# Patient Record
Sex: FEMALE | Race: WHITE | NOT HISPANIC OR LATINO | Employment: FULL TIME | URBAN - METROPOLITAN AREA
[De-identification: names, ages, dates, MRNs, and addresses within clinical notes are randomized per-mention and may not be internally consistent; named-entity substitution may affect disease eponyms.]

---

## 2023-05-11 DIAGNOSIS — E87.6 HYPOKALEMIA: ICD-10-CM

## 2023-05-11 DIAGNOSIS — K21.9 GASTROESOPHAGEAL REFLUX DISEASE, UNSPECIFIED WHETHER ESOPHAGITIS PRESENT: Primary | ICD-10-CM

## 2023-05-12 RX ORDER — POTASSIUM CHLORIDE 1500 MG/1
20 TABLET, EXTENDED RELEASE ORAL DAILY
Qty: 90 TABLET | Refills: 2 | Status: SHIPPED | OUTPATIENT
Start: 2023-05-12 | End: 2023-05-17

## 2023-05-12 RX ORDER — OMEPRAZOLE 40 MG/1
40 CAPSULE, DELAYED RELEASE ORAL DAILY
Qty: 90 CAPSULE | Refills: 2 | Status: SHIPPED | OUTPATIENT
Start: 2023-05-12 | End: 2023-05-17

## 2023-05-17 DIAGNOSIS — K21.9 GASTROESOPHAGEAL REFLUX DISEASE, UNSPECIFIED WHETHER ESOPHAGITIS PRESENT: ICD-10-CM

## 2023-05-17 DIAGNOSIS — E87.6 HYPOKALEMIA: ICD-10-CM

## 2023-05-17 RX ORDER — OMEPRAZOLE 40 MG/1
CAPSULE, DELAYED RELEASE ORAL
Qty: 30 CAPSULE | Refills: 0 | Status: SHIPPED | OUTPATIENT
Start: 2023-05-17 | End: 2023-06-13

## 2023-05-17 RX ORDER — POTASSIUM CHLORIDE 1500 MG/1
TABLET, EXTENDED RELEASE ORAL
Qty: 30 TABLET | Refills: 0 | Status: SHIPPED | OUTPATIENT
Start: 2023-05-17 | End: 2023-05-30

## 2023-05-19 LAB
CHOLESTEROL (MG/DL) IN SER/PLAS: 138 MG/DL (ref 0–199)
CHOLESTEROL IN HDL (MG/DL) IN SER/PLAS: 37.7 MG/DL
CHOLESTEROL/HDL RATIO: 3.7
LDL: ABNORMAL MG/DL (ref 0–99)
NON HDL CHOLESTEROL: 100 MG/DL
TRIGLYCERIDE (MG/DL) IN SER/PLAS: 440 MG/DL (ref 0–149)
VLDL: ABNORMAL MG/DL (ref 0–40)

## 2023-05-29 DIAGNOSIS — Z79.4 TYPE 2 DIABETES MELLITUS WITHOUT COMPLICATION, WITH LONG-TERM CURRENT USE OF INSULIN (MULTI): Primary | ICD-10-CM

## 2023-05-29 DIAGNOSIS — E87.6 HYPOKALEMIA: ICD-10-CM

## 2023-05-29 DIAGNOSIS — E11.9 TYPE 2 DIABETES MELLITUS WITHOUT COMPLICATION, WITH LONG-TERM CURRENT USE OF INSULIN (MULTI): Primary | ICD-10-CM

## 2023-05-30 RX ORDER — POTASSIUM CHLORIDE 1500 MG/1
TABLET, EXTENDED RELEASE ORAL
Qty: 30 TABLET | Refills: 0 | Status: SHIPPED | OUTPATIENT
Start: 2023-05-30 | End: 2023-07-06

## 2023-05-30 RX ORDER — INSULIN GLARGINE 100 [IU]/ML
INJECTION, SOLUTION SUBCUTANEOUS
Qty: 18 ML | Refills: 0 | Status: SHIPPED | OUTPATIENT
Start: 2023-05-30 | End: 2023-06-22

## 2023-06-13 DIAGNOSIS — K21.9 GASTROESOPHAGEAL REFLUX DISEASE, UNSPECIFIED WHETHER ESOPHAGITIS PRESENT: ICD-10-CM

## 2023-06-13 RX ORDER — OMEPRAZOLE 40 MG/1
CAPSULE, DELAYED RELEASE ORAL
Qty: 30 CAPSULE | Refills: 0 | Status: SHIPPED | OUTPATIENT
Start: 2023-06-13 | End: 2023-08-01 | Stop reason: SDUPTHER

## 2023-06-22 DIAGNOSIS — Z79.4 TYPE 2 DIABETES MELLITUS WITHOUT COMPLICATION, WITH LONG-TERM CURRENT USE OF INSULIN (MULTI): ICD-10-CM

## 2023-06-22 DIAGNOSIS — E11.9 TYPE 2 DIABETES MELLITUS WITHOUT COMPLICATION, WITH LONG-TERM CURRENT USE OF INSULIN (MULTI): ICD-10-CM

## 2023-06-22 RX ORDER — INSULIN GLARGINE 100 [IU]/ML
INJECTION, SOLUTION SUBCUTANEOUS
Qty: 18 ML | Refills: 0 | Status: SHIPPED | OUTPATIENT
Start: 2023-06-22 | End: 2023-06-22 | Stop reason: SDUPTHER

## 2023-06-23 RX ORDER — INSULIN GLARGINE 100 [IU]/ML
35 INJECTION, SOLUTION SUBCUTANEOUS 2 TIMES DAILY
Qty: 21 ML | Refills: 2 | Status: SHIPPED | OUTPATIENT
Start: 2023-06-23 | End: 2023-10-30

## 2023-07-03 DIAGNOSIS — E87.6 HYPOKALEMIA: ICD-10-CM

## 2023-07-06 RX ORDER — POTASSIUM CHLORIDE 1500 MG/1
TABLET, EXTENDED RELEASE ORAL
Qty: 30 TABLET | Refills: 0 | Status: SHIPPED | OUTPATIENT
Start: 2023-07-06 | End: 2023-07-26

## 2023-07-24 ENCOUNTER — TELEPHONE (OUTPATIENT)
Dept: PRIMARY CARE | Facility: CLINIC | Age: 63
End: 2023-07-24
Payer: COMMERCIAL

## 2023-07-24 DIAGNOSIS — L98.9 SKIN LESION: Primary | ICD-10-CM

## 2023-07-24 RX ORDER — SULFAMETHOXAZOLE AND TRIMETHOPRIM 800; 160 MG/1; MG/1
1 TABLET ORAL 2 TIMES DAILY
Qty: 14 TABLET | Refills: 0 | Status: SHIPPED | OUTPATIENT
Start: 2023-07-24 | End: 2023-07-31

## 2023-07-26 DIAGNOSIS — E87.6 HYPOKALEMIA: ICD-10-CM

## 2023-07-26 RX ORDER — POTASSIUM CHLORIDE 1500 MG/1
TABLET, EXTENDED RELEASE ORAL
Qty: 30 TABLET | Refills: 0 | Status: SHIPPED | OUTPATIENT
Start: 2023-07-26 | End: 2023-09-05

## 2023-08-01 DIAGNOSIS — K21.9 GASTROESOPHAGEAL REFLUX DISEASE, UNSPECIFIED WHETHER ESOPHAGITIS PRESENT: ICD-10-CM

## 2023-08-02 RX ORDER — OMEPRAZOLE 40 MG/1
40 CAPSULE, DELAYED RELEASE ORAL DAILY
Qty: 30 CAPSULE | Refills: 6 | Status: SHIPPED | OUTPATIENT
Start: 2023-08-02 | End: 2024-03-05

## 2023-08-28 DIAGNOSIS — E11.9 TYPE 2 DIABETES MELLITUS TREATED WITH INSULIN (MULTI): Primary | ICD-10-CM

## 2023-08-28 DIAGNOSIS — Z79.4 TYPE 2 DIABETES MELLITUS TREATED WITH INSULIN (MULTI): Primary | ICD-10-CM

## 2023-08-28 RX ORDER — DAPAGLIFLOZIN 10 MG/1
10 TABLET, FILM COATED ORAL DAILY
Qty: 90 TABLET | Refills: 0 | Status: SHIPPED | OUTPATIENT
Start: 2023-08-28 | End: 2023-12-01

## 2023-09-05 DIAGNOSIS — E87.6 HYPOKALEMIA: ICD-10-CM

## 2023-09-05 RX ORDER — POTASSIUM CHLORIDE 1500 MG/1
TABLET, EXTENDED RELEASE ORAL
Qty: 30 TABLET | Refills: 0 | Status: SHIPPED | OUTPATIENT
Start: 2023-09-05 | End: 2023-10-30

## 2023-09-08 DIAGNOSIS — H10.9 BACTERIAL CONJUNCTIVITIS: Primary | ICD-10-CM

## 2023-09-08 RX ORDER — POLYMYXIN B SULFATE AND TRIMETHOPRIM 1; 10000 MG/ML; [USP'U]/ML
1 SOLUTION OPHTHALMIC EVERY 6 HOURS
Qty: 10 ML | Refills: 0 | Status: SHIPPED | OUTPATIENT
Start: 2023-09-08 | End: 2023-10-28

## 2023-09-18 ENCOUNTER — OFFICE VISIT (OUTPATIENT)
Dept: PRIMARY CARE | Facility: CLINIC | Age: 63
End: 2023-09-18
Payer: COMMERCIAL

## 2023-09-18 VITALS
DIASTOLIC BLOOD PRESSURE: 62 MMHG | HEIGHT: 70 IN | HEART RATE: 87 BPM | SYSTOLIC BLOOD PRESSURE: 95 MMHG | WEIGHT: 239 LBS | BODY MASS INDEX: 34.22 KG/M2

## 2023-09-18 DIAGNOSIS — K13.0 LIP LESION: ICD-10-CM

## 2023-09-18 DIAGNOSIS — Z12.31 SCREENING MAMMOGRAM FOR BREAST CANCER: ICD-10-CM

## 2023-09-18 DIAGNOSIS — Z79.4 TYPE 2 DIABETES MELLITUS WITH BOTH EYES AFFECTED BY MILD NONPROLIFERATIVE RETINOPATHY AND MACULAR EDEMA, WITH LONG-TERM CURRENT USE OF INSULIN (MULTI): Primary | ICD-10-CM

## 2023-09-18 DIAGNOSIS — E11.3213 TYPE 2 DIABETES MELLITUS WITH BOTH EYES AFFECTED BY MILD NONPROLIFERATIVE RETINOPATHY AND MACULAR EDEMA, WITH LONG-TERM CURRENT USE OF INSULIN (MULTI): Primary | ICD-10-CM

## 2023-09-18 DIAGNOSIS — Z23 ENCOUNTER FOR VACCINATION: ICD-10-CM

## 2023-09-18 PROCEDURE — 90686 IIV4 VACC NO PRSV 0.5 ML IM: CPT | Performed by: INTERNAL MEDICINE

## 2023-09-18 PROCEDURE — 3078F DIAST BP <80 MM HG: CPT | Performed by: INTERNAL MEDICINE

## 2023-09-18 PROCEDURE — 3074F SYST BP LT 130 MM HG: CPT | Performed by: INTERNAL MEDICINE

## 2023-09-18 PROCEDURE — 90471 IMMUNIZATION ADMIN: CPT | Performed by: INTERNAL MEDICINE

## 2023-09-18 PROCEDURE — 99396 PREV VISIT EST AGE 40-64: CPT | Performed by: INTERNAL MEDICINE

## 2023-09-18 PROCEDURE — 3052F HG A1C>EQUAL 8.0%<EQUAL 9.0%: CPT | Performed by: INTERNAL MEDICINE

## 2023-09-18 RX ORDER — INSULIN GLARGINE 100 [IU]/ML
INJECTION, SOLUTION SUBCUTANEOUS EVERY 12 HOURS
COMMUNITY
End: 2023-12-12 | Stop reason: ALTCHOICE

## 2023-09-18 RX ORDER — EZETIMIBE 10 MG/1
1 TABLET ORAL NIGHTLY
COMMUNITY
Start: 2022-11-28 | End: 2024-06-04

## 2023-09-18 RX ORDER — GABAPENTIN 300 MG/1
300 CAPSULE ORAL NIGHTLY
COMMUNITY
End: 2024-03-05

## 2023-09-18 RX ORDER — TIRZEPATIDE 2.5 MG/.5ML
2.5 INJECTION, SOLUTION SUBCUTANEOUS
Qty: 2 ML | Refills: 0 | Status: SHIPPED | OUTPATIENT
Start: 2023-09-18 | End: 2023-11-16

## 2023-09-18 RX ORDER — ROSUVASTATIN CALCIUM 40 MG/1
1 TABLET, FILM COATED ORAL DAILY
COMMUNITY
Start: 2016-10-13 | End: 2023-12-31 | Stop reason: SDUPTHER

## 2023-09-18 RX ORDER — METOPROLOL SUCCINATE 100 MG/1
100 TABLET, EXTENDED RELEASE ORAL EVERY 12 HOURS
COMMUNITY
End: 2024-05-22

## 2023-09-18 RX ORDER — SACUBITRIL AND VALSARTAN 49; 51 MG/1; MG/1
1 TABLET, FILM COATED ORAL 2 TIMES DAILY
COMMUNITY
End: 2024-05-14

## 2023-09-18 RX ORDER — FUROSEMIDE 20 MG/1
20 TABLET ORAL DAILY
COMMUNITY

## 2023-09-18 RX ORDER — DAPAGLIFLOZIN 10 MG/1
1 TABLET, FILM COATED ORAL DAILY
COMMUNITY
Start: 2019-11-05 | End: 2023-12-11 | Stop reason: ALTCHOICE

## 2023-09-18 RX ORDER — ASPIRIN 81 MG/1
1 TABLET ORAL DAILY
COMMUNITY
Start: 2016-02-17 | End: 2023-12-12 | Stop reason: ALTCHOICE

## 2023-09-18 RX ORDER — METFORMIN HYDROCHLORIDE 500 MG/1
2 TABLET, EXTENDED RELEASE ORAL 2 TIMES DAILY
COMMUNITY
Start: 2014-11-14 | End: 2024-03-07 | Stop reason: SDUPTHER

## 2023-09-18 RX ORDER — POTASSIUM CHLORIDE 20 MEQ/1
1 TABLET, EXTENDED RELEASE ORAL DAILY PRN
COMMUNITY
Start: 2021-05-10 | End: 2023-12-12 | Stop reason: SDUPTHER

## 2023-09-18 RX ORDER — TICAGRELOR 60 MG/1
1 TABLET ORAL 2 TIMES DAILY
COMMUNITY
Start: 2017-10-11 | End: 2024-06-04

## 2023-09-18 RX ORDER — GLIMEPIRIDE 4 MG/1
4 TABLET ORAL 2 TIMES DAILY
COMMUNITY
End: 2024-03-07 | Stop reason: SDUPTHER

## 2023-09-18 RX ORDER — SPIRONOLACTONE 25 MG/1
0.5 TABLET ORAL DAILY
COMMUNITY
Start: 2017-01-03

## 2023-09-18 NOTE — PATIENT INSTRUCTIONS
Pj,     You are getting a flu shot today. I recommend COVID from the pharmacy, and the new shingles shot Shingrix, 2 shots, 2-6 months apart from me or the pharmacy. Check with pharmacy when your last pneumonia shot was - my chart says 2018 and I'd recommend every 5 years. Also check on last tetanus.   I would love to get you on Mounjaro or Ozempic - ultimately this would enable us to remove glimepiride, possibly reduce insulin, lose weight. Check with [employer] on price, we will work on prior authorization. If we do achieve coverage, you do 2.5 mg weekly for 4 weeks then 5 mg weekly for 4 weeks then we can increase from there. With Ozempic there is also a monthly uptitration or increase plan.   Try gabapentin 300 mg TWO capsules an hour before beditme for leg pain and muscle cramping.   Try compression hose for the right leg and we could always have you see Dr. Murray again,, or a vascular surgeon if you ever wanted to discuss surgical options to help with the right leg swelling.   Call the referral line to schedule with ENT for the lip lesion - anyone is fine   Call [phone] to schedule mammogram 12-20-23 or later.   [place] for [facility].   See me back in 3 months.     CL

## 2023-09-18 NOTE — PROGRESS NOTES
"Pj Clarke is a 61 yo F presenting for CPE.   Told by Dr. Hutchison infrequent short runs of AF but not told to adjust meds or add DOAC.   RLE swelling persists, wonders if this is due to a cardiac problem or a more peripheral problem.   Noctrunal muscle cramps are problematic.     *Lip lesion     1. Chronic venous insufficiency with GSV reflux 6.22 scan s/p vasc referral with visits to Dr. Webster in fall 2022 for presumed cellulitis, diabetic neuropathy, Foot pain s/p ortho referral c.w posterior tibial tendinitis   -darron new start 11.22 - \"best thing I ever did\"!   -wore boot 11.22-1.23   -plans to have shoe inserts made      2. T2DM 9.3% 11.22  -Lantus 35-35  -Farxiga   -glimep 4 BID    [ ]no sugars <70     3. CAD with NSTEMI s/p PCI, VT arrest s/p PPM, HFmrEF   -[facility] cardiology ([facility])   -Brilinta   -asa   -Crestor 40   -Entresto 49-21   -spirono 25 1/2 tab   -Lasix 20   -liids at goal 5.23     4. Chronic cough, pulm nodules, CT evidence of granulomas neg for malig felt suspected histoplasmosis   -[facility] pulm ([facility]), s/p VATS biopsy 2021      5. MDD   -quiescient, no meds since 2010, remote ECT (2002)      #HM   -screen labs 11.22 [ ]DUE  -mammo 12.20.22 - due 12.20.23  -cscope 2015 - rec'ed 5 years, pt deferring for now (no polyps)   -pap due 4.2024 with me     -?tdap unknown date, she will check   -flu shot today  -prevnar  due but thinks had a pneumonia shot at pharmacy - she will investigate  -shingrix due& discussed     70\"   235       Gen Aox3 NAD well appearing   HEENT mmm pharynx clear no cervical LAD +raised lip lesion buccal mucosa lower lip  Eyes sclerae clear   CV rrr nl s1/2 no m/r/g  Pulm CTAB no adventitious sounds   Ext warm dry no edema 2+ DP pulse       "

## 2023-09-23 ENCOUNTER — LAB (OUTPATIENT)
Dept: LAB | Facility: LAB | Age: 63
End: 2023-09-23
Payer: COMMERCIAL

## 2023-09-23 DIAGNOSIS — Z79.4 TYPE 2 DIABETES MELLITUS WITH BOTH EYES AFFECTED BY MILD NONPROLIFERATIVE RETINOPATHY AND MACULAR EDEMA, WITH LONG-TERM CURRENT USE OF INSULIN (MULTI): ICD-10-CM

## 2023-09-23 DIAGNOSIS — E11.3213 TYPE 2 DIABETES MELLITUS WITH BOTH EYES AFFECTED BY MILD NONPROLIFERATIVE RETINOPATHY AND MACULAR EDEMA, WITH LONG-TERM CURRENT USE OF INSULIN (MULTI): ICD-10-CM

## 2023-09-23 LAB
BASOPHILS (10*3/UL) IN BLOOD BY AUTOMATED COUNT: 0.03 X10E9/L (ref 0–0.1)
BASOPHILS/100 LEUKOCYTES IN BLOOD BY AUTOMATED COUNT: 0.5 % (ref 0–2)
EOSINOPHILS (10*3/UL) IN BLOOD BY AUTOMATED COUNT: 0.05 X10E9/L (ref 0–0.7)
EOSINOPHILS/100 LEUKOCYTES IN BLOOD BY AUTOMATED COUNT: 0.8 % (ref 0–6)
ERYTHROCYTE DISTRIBUTION WIDTH (RATIO) BY AUTOMATED COUNT: 16.1 % (ref 11.5–14.5)
ERYTHROCYTE MEAN CORPUSCULAR HEMOGLOBIN CONCENTRATION (G/DL) BY AUTOMATED: 31.8 G/DL (ref 32–36)
ERYTHROCYTE MEAN CORPUSCULAR VOLUME (FL) BY AUTOMATED COUNT: 94 FL (ref 80–100)
ERYTHROCYTES (10*6/UL) IN BLOOD BY AUTOMATED COUNT: 4.92 X10E12/L (ref 4–5.2)
ESTIMATED AVERAGE GLUCOSE FOR HBA1C: 194 MG/DL
HEMATOCRIT (%) IN BLOOD BY AUTOMATED COUNT: 46.2 % (ref 36–46)
HEMOGLOBIN (G/DL) IN BLOOD: 14.7 G/DL (ref 12–16)
HEMOGLOBIN A1C/HEMOGLOBIN TOTAL IN BLOOD: 8.4 %
IMMATURE GRANULOCYTES/100 LEUKOCYTES IN BLOOD BY AUTOMATED COUNT: 0.3 % (ref 0–0.9)
LEUKOCYTES (10*3/UL) IN BLOOD BY AUTOMATED COUNT: 6 X10E9/L (ref 4.4–11.3)
LYMPHOCYTES (10*3/UL) IN BLOOD BY AUTOMATED COUNT: 1.28 X10E9/L (ref 1.2–4.8)
LYMPHOCYTES/100 LEUKOCYTES IN BLOOD BY AUTOMATED COUNT: 21.3 % (ref 13–44)
MONOCYTES (10*3/UL) IN BLOOD BY AUTOMATED COUNT: 0.52 X10E9/L (ref 0.1–1)
MONOCYTES/100 LEUKOCYTES IN BLOOD BY AUTOMATED COUNT: 8.7 % (ref 2–10)
NEUTROPHILS (10*3/UL) IN BLOOD BY AUTOMATED COUNT: 4.11 X10E9/L (ref 1.2–7.7)
NEUTROPHILS/100 LEUKOCYTES IN BLOOD BY AUTOMATED COUNT: 68.4 % (ref 40–80)
NRBC (PER 100 WBCS) BY AUTOMATED COUNT: 0 /100 WBC (ref 0–0)
PLATELETS (10*3/UL) IN BLOOD AUTOMATED COUNT: 266 X10E9/L (ref 150–450)

## 2023-09-23 PROCEDURE — 80061 LIPID PANEL: CPT

## 2023-09-23 PROCEDURE — 82570 ASSAY OF URINE CREATININE: CPT

## 2023-09-23 PROCEDURE — 82306 VITAMIN D 25 HYDROXY: CPT

## 2023-09-23 PROCEDURE — 83036 HEMOGLOBIN GLYCOSYLATED A1C: CPT

## 2023-09-23 PROCEDURE — 36415 COLL VENOUS BLD VENIPUNCTURE: CPT

## 2023-09-23 PROCEDURE — 85025 COMPLETE CBC W/AUTO DIFF WBC: CPT

## 2023-09-23 PROCEDURE — 84443 ASSAY THYROID STIM HORMONE: CPT

## 2023-09-23 PROCEDURE — 82043 UR ALBUMIN QUANTITATIVE: CPT

## 2023-09-23 PROCEDURE — 80053 COMPREHEN METABOLIC PANEL: CPT

## 2023-09-24 LAB
ALANINE AMINOTRANSFERASE (SGPT) (U/L) IN SER/PLAS: 25 U/L (ref 7–45)
ALBUMIN (G/DL) IN SER/PLAS: 4.6 G/DL (ref 3.4–5)
ALBUMIN (MG/L) IN URINE: 29.4 MG/L
ALBUMIN/CREATININE (UG/MG) IN URINE: 117.6 UG/MG CRT (ref 0–30)
ALKALINE PHOSPHATASE (U/L) IN SER/PLAS: 70 U/L (ref 33–136)
ANION GAP IN SER/PLAS: 17 MMOL/L (ref 10–20)
ASPARTATE AMINOTRANSFERASE (SGOT) (U/L) IN SER/PLAS: 29 U/L (ref 9–39)
BILIRUBIN TOTAL (MG/DL) IN SER/PLAS: 0.5 MG/DL (ref 0–1.2)
CALCIDIOL (25 OH VITAMIN D3) (NG/ML) IN SER/PLAS: 9 NG/ML
CALCIUM (MG/DL) IN SER/PLAS: 10.2 MG/DL (ref 8.6–10.6)
CARBON DIOXIDE, TOTAL (MMOL/L) IN SER/PLAS: 20 MMOL/L (ref 21–32)
CHLORIDE (MMOL/L) IN SER/PLAS: 106 MMOL/L (ref 98–107)
CHOLESTEROL (MG/DL) IN SER/PLAS: 211 MG/DL (ref 0–199)
CHOLESTEROL IN HDL (MG/DL) IN SER/PLAS: 47.6 MG/DL
CHOLESTEROL/HDL RATIO: 4.4
CREATININE (MG/DL) IN SER/PLAS: 1.13 MG/DL (ref 0.5–1.05)
CREATININE (MG/DL) IN URINE: 25 MG/DL (ref 20–320)
GFR FEMALE: 54 ML/MIN/1.73M2
GLUCOSE (MG/DL) IN SER/PLAS: 165 MG/DL (ref 74–99)
LDL: ABNORMAL MG/DL (ref 0–99)
NON HDL CHOLESTEROL: 163 MG/DL
POTASSIUM (MMOL/L) IN SER/PLAS: 4.7 MMOL/L (ref 3.5–5.3)
PROTEIN TOTAL: 7.1 G/DL (ref 6.4–8.2)
SODIUM (MMOL/L) IN SER/PLAS: 138 MMOL/L (ref 136–145)
THYROTROPIN (MIU/L) IN SER/PLAS BY DETECTION LIMIT <= 0.05 MIU/L: 2.43 MIU/L (ref 0.44–3.98)
TRIGLYCERIDE (MG/DL) IN SER/PLAS: 419 MG/DL (ref 0–149)
UREA NITROGEN (MG/DL) IN SER/PLAS: 28 MG/DL (ref 6–23)
VLDL: ABNORMAL MG/DL (ref 0–40)

## 2023-10-13 PROBLEM — R06.83 PRIMARY SNORING: Status: ACTIVE | Noted: 2023-10-13

## 2023-10-13 PROBLEM — R91.1 PULMONARY NODULE: Status: ACTIVE | Noted: 2023-10-13

## 2023-10-13 PROBLEM — S62.101A CLOSED FRACTURE OF RIGHT WRIST: Status: ACTIVE | Noted: 2023-10-13

## 2023-10-13 PROBLEM — G47.00 INSOMNIA: Status: ACTIVE | Noted: 2023-10-13

## 2023-10-13 PROBLEM — S69.92XA WRIST INJURY, LEFT, INITIAL ENCOUNTER: Status: ACTIVE | Noted: 2023-10-13

## 2023-10-13 PROBLEM — H01.006: Status: ACTIVE | Noted: 2023-10-13

## 2023-10-13 PROBLEM — S67.02XA: Status: ACTIVE | Noted: 2023-10-13

## 2023-10-13 PROBLEM — R10.12 COLICKY LEFT UPPER QUADRANT PAIN: Status: ACTIVE | Noted: 2023-10-13

## 2023-10-13 PROBLEM — M79.676 TOE PAIN: Status: ACTIVE | Noted: 2023-10-13

## 2023-10-13 PROBLEM — H52.10 MYOPIA WITH ASTIGMATISM AND PRESBYOPIA: Status: ACTIVE | Noted: 2023-10-13

## 2023-10-13 PROBLEM — H52.209 MYOPIA WITH ASTIGMATISM AND PRESBYOPIA: Status: ACTIVE | Noted: 2023-10-13

## 2023-10-13 PROBLEM — R26.2 DIFFICULTY WALKING: Status: ACTIVE | Noted: 2023-10-13

## 2023-10-13 PROBLEM — R09.82 POST-NASAL DRIP: Status: ACTIVE | Noted: 2023-10-13

## 2023-10-13 PROBLEM — M76.822 POSTERIOR TIBIAL TENDONITIS, LEFT: Status: ACTIVE | Noted: 2023-10-13

## 2023-10-13 PROBLEM — M65.319 ACQUIRED TRIGGER THUMB: Status: ACTIVE | Noted: 2023-10-13

## 2023-10-13 PROBLEM — H52.4 MYOPIA WITH ASTIGMATISM AND PRESBYOPIA: Status: ACTIVE | Noted: 2023-10-13

## 2023-10-13 PROBLEM — S69.91XA WRIST INJURY, RIGHT, INITIAL ENCOUNTER: Status: ACTIVE | Noted: 2023-10-13

## 2023-10-13 PROBLEM — R82.90 ABNORMAL FINDING IN URINE: Status: ACTIVE | Noted: 2023-10-13

## 2023-10-13 PROBLEM — M79.606 LOWER EXTREMITY PAIN: Status: ACTIVE | Noted: 2023-10-13

## 2023-10-13 PROBLEM — R05.3 CHRONIC COUGH: Status: ACTIVE | Noted: 2023-10-13

## 2023-10-13 PROBLEM — B37.2 CANDIDAL SKIN INFECTION: Status: ACTIVE | Noted: 2023-10-13

## 2023-10-13 PROBLEM — G62.9 PERIPHERAL NEUROPATHY: Status: ACTIVE | Noted: 2023-10-13

## 2023-10-13 PROBLEM — R60.9 EDEMA: Status: ACTIVE | Noted: 2023-10-13

## 2023-10-13 PROBLEM — E78.5 HYPERLIPIDEMIA: Status: ACTIVE | Noted: 2023-10-13

## 2023-10-13 PROBLEM — R59.0 HILAR LYMPHADENOPATHY: Status: ACTIVE | Noted: 2023-10-13

## 2023-10-13 PROBLEM — M19.049 ARTHRITIS OF CARPOMETACARPAL JOINT: Status: ACTIVE | Noted: 2023-10-13

## 2023-10-13 PROBLEM — S91.209A NAIL AVULSION OF TOE: Status: ACTIVE | Noted: 2023-10-13

## 2023-10-13 PROBLEM — R60.0 LOWER EXTREMITY EDEMA: Status: ACTIVE | Noted: 2023-10-13

## 2023-10-13 PROBLEM — S52.501A CLOSED FRACTURE OF RIGHT DISTAL RADIUS: Status: ACTIVE | Noted: 2023-10-13

## 2023-10-13 PROBLEM — M25.531: Status: ACTIVE | Noted: 2023-10-13

## 2023-10-13 PROBLEM — K62.5 RECTAL BLEEDING: Status: ACTIVE | Noted: 2023-10-13

## 2023-10-13 PROBLEM — M25.631 STIFFNESS OF RIGHT WRIST JOINT: Status: ACTIVE | Noted: 2023-10-13

## 2023-10-13 PROBLEM — I25.10 CAD IN NATIVE ARTERY: Status: ACTIVE | Noted: 2023-10-13

## 2023-10-13 PROBLEM — N64.4 BREAST PAIN: Status: ACTIVE | Noted: 2023-10-13

## 2023-10-13 PROBLEM — B36.9 FUNGAL SKIN INFECTION: Status: ACTIVE | Noted: 2023-10-13

## 2023-10-13 PROBLEM — E11.40 DIABETIC NEUROPATHY (MULTI): Status: ACTIVE | Noted: 2023-10-13

## 2023-10-13 PROBLEM — M31.30 NECROTIZING GRANULOMATOUS INFLAMMATION OF LUNG (MULTI): Status: ACTIVE | Noted: 2023-10-13

## 2023-10-13 PROBLEM — I21.3 STEMI (ST ELEVATION MYOCARDIAL INFARCTION) (MULTI): Status: ACTIVE | Noted: 2023-10-13

## 2023-10-13 PROBLEM — H92.21 EAR BLEEDING, RIGHT: Status: ACTIVE | Noted: 2023-10-13

## 2023-10-13 PROBLEM — S89.91XA KNEE INJURY, RIGHT, INITIAL ENCOUNTER: Status: ACTIVE | Noted: 2023-10-13

## 2023-10-13 PROBLEM — Z95.810 CARDIAC DEFIBRILLATOR IN PLACE: Status: ACTIVE | Noted: 2023-10-13

## 2023-10-13 PROBLEM — E11.59 TYPE 2 DIABETES MELLITUS WITH CIRCULATORY DISORDER (MULTI): Status: ACTIVE | Noted: 2023-10-13

## 2023-10-13 PROBLEM — S09.90XA HEAD TRAUMA: Status: ACTIVE | Noted: 2023-10-13

## 2023-10-13 PROBLEM — Z95.5 PRESENCE OF STENT IN LAD CORONARY ARTERY: Status: ACTIVE | Noted: 2023-10-13

## 2023-10-13 PROBLEM — I10 BENIGN ESSENTIAL HYPERTENSION: Status: ACTIVE | Noted: 2023-10-13

## 2023-10-13 PROBLEM — M79.672 LEFT FOOT PAIN: Status: ACTIVE | Noted: 2023-10-13

## 2023-10-13 PROBLEM — I25.5 CARDIOMYOPATHY, ISCHEMIC: Status: ACTIVE | Noted: 2023-10-13

## 2023-10-13 PROBLEM — E78.1 ESSENTIAL HYPERTRIGLYCERIDEMIA: Status: ACTIVE | Noted: 2023-10-13

## 2023-10-13 PROBLEM — N30.01 ACUTE CYSTITIS WITH HEMATURIA: Status: ACTIVE | Noted: 2023-10-13

## 2023-10-13 PROBLEM — I83.891 VARICOSE VEINS OF RIGHT LOWER EXTREMITY WITH EDEMA: Status: ACTIVE | Noted: 2023-10-13

## 2023-10-13 PROBLEM — M79.646 THUMB PAIN: Status: ACTIVE | Noted: 2023-10-13

## 2023-10-13 PROBLEM — M25.531 RIGHT WRIST PAIN: Status: ACTIVE | Noted: 2023-10-13

## 2023-10-13 RX ORDER — PEN NEEDLE, DIABETIC 29 G X1/2"
NEEDLE, DISPOSABLE MISCELLANEOUS
COMMUNITY

## 2023-10-13 RX ORDER — DAPAGLIFLOZIN 5 MG/1
5 TABLET, FILM COATED ORAL DAILY
COMMUNITY
End: 2023-12-11 | Stop reason: ALTCHOICE

## 2023-10-13 RX ORDER — PEN NEEDLE, DIABETIC 32GX 5/32"
NEEDLE, DISPOSABLE MISCELLANEOUS
COMMUNITY
Start: 2023-09-17 | End: 2024-01-16

## 2023-10-13 RX ORDER — BECLOMETHASONE DIPROPIONATE HFA 80 UG/1
160 AEROSOL, METERED RESPIRATORY (INHALATION) DAILY PRN
Status: ON HOLD | COMMUNITY
End: 2024-01-19 | Stop reason: ENTERED-IN-ERROR

## 2023-10-16 ENCOUNTER — OFFICE VISIT (OUTPATIENT)
Dept: OTOLARYNGOLOGY | Facility: CLINIC | Age: 63
End: 2023-10-16
Payer: COMMERCIAL

## 2023-10-16 VITALS — BODY MASS INDEX: 33.07 KG/M2 | WEIGHT: 231 LBS | TEMPERATURE: 96.5 F | HEIGHT: 70 IN

## 2023-10-16 DIAGNOSIS — K13.0 LESION OF LIP: Primary | ICD-10-CM

## 2023-10-16 DIAGNOSIS — K13.0 LIP LESION: ICD-10-CM

## 2023-10-16 PROCEDURE — 99204 OFFICE O/P NEW MOD 45 MIN: CPT | Performed by: OTOLARYNGOLOGY

## 2023-10-16 PROCEDURE — 1036F TOBACCO NON-USER: CPT | Performed by: OTOLARYNGOLOGY

## 2023-10-16 PROCEDURE — 88305 TISSUE EXAM BY PATHOLOGIST: CPT

## 2023-10-16 PROCEDURE — 3052F HG A1C>EQUAL 8.0%<EQUAL 9.0%: CPT | Performed by: OTOLARYNGOLOGY

## 2023-10-16 PROCEDURE — 40490 BIOPSY OF LIP: CPT | Performed by: OTOLARYNGOLOGY

## 2023-10-16 RX ORDER — CHLORHEXIDINE GLUCONATE ORAL RINSE 1.2 MG/ML
15 SOLUTION DENTAL AS NEEDED
Qty: 120 ML | Refills: 0 | Status: SHIPPED | OUTPATIENT
Start: 2023-10-16 | End: 2023-10-26

## 2023-10-16 ASSESSMENT — PATIENT HEALTH QUESTIONNAIRE - PHQ9
SUM OF ALL RESPONSES TO PHQ9 QUESTIONS 1 AND 2: 0
1. LITTLE INTEREST OR PLEASURE IN DOING THINGS: NOT AT ALL
2. FEELING DOWN, DEPRESSED OR HOPELESS: NOT AT ALL

## 2023-10-16 NOTE — PROGRESS NOTES
ENT New Patient Visit    Chief complaint: right lip lesion    History Of Present Illness  Pj Clarke is a 63 y.o. female presents for evaluation of a right lower lip mass. This has been present for 6 weeks. Seems to swell and decrease in size. Was sore briefly. There's also a smaller lesion on the left but no pain there. Never had this before. Not a smoker. Is on blood thinners for CAD/stent.     Past Medical History  She has a past medical history of Acute ischemic heart disease, unspecified (CMS/HCC), Dermatochalasis of unspecified eye, unspecified eyelid (11/13/2018), Essential (primary) hypertension (11/16/2022), Personal history of other mental and behavioral disorders, and Pure hyperglyceridemia (11/28/2016).    Surgical History  She has a past surgical history that includes Cholecystectomy (06/19/2013); Other surgical history (06/29/2021); Gallbladder surgery (05/11/2016); Tonsillectomy (05/11/2016); Hysterectomy (02/11/2015); and Other surgical history (01/03/2017).     Social History  She reports that she has never smoked. She has never used smokeless tobacco. No history on file for alcohol use and drug use.    Family History  Family History   Problem Relation Name Age of Onset    Other (Malignant neoplasm) Mother      Other (Malignant neoplasm) Father      Colon cancer Mother's Sister      Other (Family history of diabetes mellitus) Other          Allergies  Penicillins and Atorvastatin    Review of Systems     Physical Exam:  CONSTITUTIONAL:  No acute distress  VOICE:  No hoarseness or other abnormality  RESPIRATION:  Breathing comfortably, no stridor  CV:  No clubbing/cyanosis/edema in hands  EYES:  EOM intact, sclera normal  NEURO:  Alert and oriented times 3, Cranial nerves II-XII grossly intact and symmetric bilaterally  HEAD AND FACE:  Symmetric facial features, no masses or lesions, sinuses non-tender to palpation  SALIVARY GLANDS:  Parotid and submandibular glands normal  "bilaterally  EARS:  Normal external ears, external auditory canals, and TMs to otoscopy, normal hearing to whispered voice.  NOSE:  External nose midline, anterior rhinoscopy is normal with limited visualization to the anterior aspect of the interior turbinates, no bleeding or drainage, no lesions  ORAL CAVITY/OROPHARYNX/LIPS:  right lower lip with 1 cm firmness, mobile; left lower lip with fibroma appearing lesion, smaller than right  PHARYNGEAL WALLS:  No masses or lesions  NECK/LYMPH:  No LAD, no thyroid masses, trachea midline  SKIN:  Neck skin is without scar or injury  PSYCH:  Alert and oriented with appropriate mood and affect    Procedure:  Lip biopsy  Right lower lip anesthetized with 1% lidocaine with 1:100,000 epinephrine  An fusiform incision was made over the slightly red skin area.  15 blade was used to make the incision. Then we dissected submucosally to remove what appeared to be swollen minor salivary glands  The incision was closed using running lock 4-0 chromic  Pt tolerated well    Last Recorded Vitals  Temperature 35.8 °C (96.5 °F), height 1.778 m (5' 10\"), weight 105 kg (231 lb).     Assessment/Plan   64 yo woman with what appears to be an obstructed right lower lip minor salivary gland, left fibroma or another obstructed gland  -follow up path  -rTC in 1 month to remove the other side and for wound check patient.  -peridex      Roberto Stevenson MD    "

## 2023-10-25 LAB
LABORATORY COMMENT REPORT: NORMAL
PATH REPORT.FINAL DX SPEC: NORMAL
PATH REPORT.GROSS SPEC: NORMAL
PATH REPORT.RELEVANT HX SPEC: NORMAL
PATH REPORT.TOTAL CANCER: NORMAL

## 2023-10-27 DIAGNOSIS — E87.6 HYPOKALEMIA: ICD-10-CM

## 2023-10-27 DIAGNOSIS — Z79.4 TYPE 2 DIABETES MELLITUS WITHOUT COMPLICATION, WITH LONG-TERM CURRENT USE OF INSULIN (MULTI): ICD-10-CM

## 2023-10-27 DIAGNOSIS — E11.9 TYPE 2 DIABETES MELLITUS WITHOUT COMPLICATION, WITH LONG-TERM CURRENT USE OF INSULIN (MULTI): ICD-10-CM

## 2023-10-30 RX ORDER — POTASSIUM CHLORIDE 20 MEQ/1
20 TABLET, EXTENDED RELEASE ORAL DAILY
Qty: 90 TABLET | Refills: 2 | Status: SHIPPED | OUTPATIENT
Start: 2023-10-30

## 2023-10-30 RX ORDER — INSULIN GLARGINE 100 [IU]/ML
INJECTION, SOLUTION SUBCUTANEOUS
Qty: 21 ML | Refills: 5 | Status: SHIPPED | OUTPATIENT
Start: 2023-10-30 | End: 2024-05-21 | Stop reason: SDUPTHER

## 2023-10-30 NOTE — RESULT ENCOUNTER NOTE
Tried to call pt multiple times over past week. Left VM eventually with biopsy results showing benign mucocele, and left office  # to call back to let me know how she is doing.

## 2023-11-09 ENCOUNTER — HOSPITAL ENCOUNTER (OUTPATIENT)
Dept: CARDIOLOGY | Facility: CLINIC | Age: 63
Discharge: HOME | End: 2023-11-09
Payer: COMMERCIAL

## 2023-11-09 DIAGNOSIS — I47.20 VENTRICULAR TACHYCARDIA (MULTI): ICD-10-CM

## 2023-11-15 DIAGNOSIS — Z79.4 TYPE 2 DIABETES MELLITUS WITH BOTH EYES AFFECTED BY MILD NONPROLIFERATIVE RETINOPATHY AND MACULAR EDEMA, WITH LONG-TERM CURRENT USE OF INSULIN (MULTI): ICD-10-CM

## 2023-11-15 DIAGNOSIS — E11.3213 TYPE 2 DIABETES MELLITUS WITH BOTH EYES AFFECTED BY MILD NONPROLIFERATIVE RETINOPATHY AND MACULAR EDEMA, WITH LONG-TERM CURRENT USE OF INSULIN (MULTI): ICD-10-CM

## 2023-11-16 RX ORDER — TIRZEPATIDE 2.5 MG/.5ML
INJECTION, SOLUTION SUBCUTANEOUS
Qty: 2 ML | Refills: 1 | Status: SHIPPED | OUTPATIENT
Start: 2023-11-16 | End: 2024-01-29

## 2023-11-20 ENCOUNTER — APPOINTMENT (OUTPATIENT)
Dept: OTOLARYNGOLOGY | Facility: CLINIC | Age: 63
End: 2023-11-20
Payer: COMMERCIAL

## 2023-11-27 ENCOUNTER — APPOINTMENT (OUTPATIENT)
Dept: OTOLARYNGOLOGY | Facility: CLINIC | Age: 63
End: 2023-11-27
Payer: COMMERCIAL

## 2023-12-01 DIAGNOSIS — Z79.4 TYPE 2 DIABETES MELLITUS WITH BOTH EYES AFFECTED BY MILD NONPROLIFERATIVE RETINOPATHY AND MACULAR EDEMA, WITH LONG-TERM CURRENT USE OF INSULIN (MULTI): ICD-10-CM

## 2023-12-01 DIAGNOSIS — E11.9 TYPE 2 DIABETES MELLITUS TREATED WITH INSULIN (MULTI): ICD-10-CM

## 2023-12-01 DIAGNOSIS — Z79.4 TYPE 2 DIABETES MELLITUS TREATED WITH INSULIN (MULTI): ICD-10-CM

## 2023-12-01 DIAGNOSIS — E11.3213 TYPE 2 DIABETES MELLITUS WITH BOTH EYES AFFECTED BY MILD NONPROLIFERATIVE RETINOPATHY AND MACULAR EDEMA, WITH LONG-TERM CURRENT USE OF INSULIN (MULTI): ICD-10-CM

## 2023-12-01 RX ORDER — DAPAGLIFLOZIN 10 MG/1
10 TABLET, FILM COATED ORAL DAILY
Qty: 90 TABLET | Refills: 2 | OUTPATIENT
Start: 2023-12-01

## 2023-12-01 RX ORDER — DAPAGLIFLOZIN 10 MG/1
10 TABLET, FILM COATED ORAL DAILY
Qty: 90 TABLET | Refills: 0 | Status: SHIPPED | OUTPATIENT
Start: 2023-12-01 | End: 2024-03-18

## 2023-12-11 PROBLEM — I50.22 CHRONIC SYSTOLIC CONGESTIVE HEART FAILURE (MULTI): Status: ACTIVE | Noted: 2018-04-03

## 2023-12-11 PROBLEM — I10 HYPERTENSION: Status: ACTIVE | Noted: 2023-12-11

## 2023-12-12 ENCOUNTER — HOSPITAL ENCOUNTER (OUTPATIENT)
Dept: CARDIOLOGY | Facility: CLINIC | Age: 63
Discharge: HOME | End: 2023-12-12
Payer: COMMERCIAL

## 2023-12-12 ENCOUNTER — OFFICE VISIT (OUTPATIENT)
Dept: CARDIOLOGY | Facility: HOSPITAL | Age: 63
End: 2023-12-12
Payer: COMMERCIAL

## 2023-12-12 VITALS
BODY MASS INDEX: 32.93 KG/M2 | OXYGEN SATURATION: 98 % | WEIGHT: 230 LBS | HEIGHT: 70 IN | SYSTOLIC BLOOD PRESSURE: 104 MMHG | DIASTOLIC BLOOD PRESSURE: 68 MMHG | HEART RATE: 86 BPM

## 2023-12-12 DIAGNOSIS — I25.10 CAD IN NATIVE ARTERY: Primary | ICD-10-CM

## 2023-12-12 DIAGNOSIS — I10 BENIGN ESSENTIAL HYPERTENSION: ICD-10-CM

## 2023-12-12 DIAGNOSIS — R07.9 CHEST PAIN, UNSPECIFIED TYPE: ICD-10-CM

## 2023-12-12 DIAGNOSIS — I50.22 CHRONIC SYSTOLIC CONGESTIVE HEART FAILURE (MULTI): ICD-10-CM

## 2023-12-12 DIAGNOSIS — E78.2 MIXED HYPERLIPIDEMIA: ICD-10-CM

## 2023-12-12 DIAGNOSIS — I47.20 VT (VENTRICULAR TACHYCARDIA) (MULTI): ICD-10-CM

## 2023-12-12 DIAGNOSIS — I25.5 CARDIOMYOPATHY, ISCHEMIC: ICD-10-CM

## 2023-12-12 PROCEDURE — 99214 OFFICE O/P EST MOD 30 MIN: CPT | Performed by: INTERNAL MEDICINE

## 2023-12-12 PROCEDURE — 93296 REM INTERROG EVL PM/IDS: CPT

## 2023-12-12 PROCEDURE — 3052F HG A1C>EQUAL 8.0%<EQUAL 9.0%: CPT | Performed by: INTERNAL MEDICINE

## 2023-12-12 PROCEDURE — 93295 DEV INTERROG REMOTE 1/2/MLT: CPT | Performed by: INTERNAL MEDICINE

## 2023-12-12 PROCEDURE — 1036F TOBACCO NON-USER: CPT | Performed by: INTERNAL MEDICINE

## 2023-12-12 PROCEDURE — 93005 ELECTROCARDIOGRAM TRACING: CPT | Performed by: INTERNAL MEDICINE

## 2023-12-12 PROCEDURE — 3078F DIAST BP <80 MM HG: CPT | Performed by: INTERNAL MEDICINE

## 2023-12-12 PROCEDURE — 3074F SYST BP LT 130 MM HG: CPT | Performed by: INTERNAL MEDICINE

## 2023-12-12 PROCEDURE — 99214 OFFICE O/P EST MOD 30 MIN: CPT | Mod: 25 | Performed by: INTERNAL MEDICINE

## 2023-12-12 PROCEDURE — 93010 ELECTROCARDIOGRAM REPORT: CPT | Performed by: INTERNAL MEDICINE

## 2023-12-12 RX ORDER — ASPIRIN 81 MG/1
81 TABLET ORAL DAILY
COMMUNITY

## 2023-12-12 ASSESSMENT — ENCOUNTER SYMPTOMS
LOSS OF SENSATION IN FEET: 0
DEPRESSION: 0
OCCASIONAL FEELINGS OF UNSTEADINESS: 0

## 2023-12-12 NOTE — PROGRESS NOTES
Primary Care Physician: Jailene Chandra MD  Date of Visit: 12/12/2023  3:20 PM EST  Location of visit: [facility]     Chief Complaint:   Chief Complaint   Patient presents with    Follow-up    Cardiomyopathy    Coronary Artery Disease    Hypertension    Palpitations        HPI / Summary:   Pj Clarke is a 63 y.o. female presents for followup.  She has no particular complaints.  Last month she did have 2 or 3 episodes of chest discomfort that lasted up to 1 minute.  It was a right-sided dull ache distinctly different than her prior angina.  It was not associated with exertion.  She did not have any shortness of breath, nausea, vomiting or diaphoresis.  She has not had any episodes in the last 2 weeks.  The patient denies shortness of breath, palpitations, lightheadedness, syncope, orthopnea, paroxysmal nocturnal dyspnea, lower extremity edema, or bleeding problems.          Past Medical History:   Diagnosis Date    Acute ischemic heart disease, unspecified (CMS/HCC)     Acute coronary syndrome    Dermatochalasis of unspecified eye, unspecified eyelid 11/13/2018    Dermatochalasis    Essential (primary) hypertension 11/16/2022    Benign essential hypertension    Personal history of other mental and behavioral disorders     History of depression    Pure hyperglyceridemia 11/28/2016    Essential hypertriglyceridemia        Past Surgical History:   Procedure Laterality Date    CHOLECYSTECTOMY  06/19/2013    Cholecystectomy    GALLBLADDER SURGERY  05/11/2016    Gallbladder Surgery    HYSTERECTOMY  02/11/2015    Hysterectomy    OTHER SURGICAL HISTORY  06/29/2021    Lung wedge resection    OTHER SURGICAL HISTORY  01/03/2017    Implantable Cardioverter-Defibrillator    TONSILLECTOMY  05/11/2016    Tonsillectomy          Social History:   reports that she has never smoked. She has never used smokeless tobacco. She reports current alcohol use. She reports that she does not currently use drugs.  "    Family History:  family history includes Colon cancer in her mother's sister; Family history of diabetes mellitus in an other family member; Malignant neoplasm in her father and mother.      Allergies:  Allergies   Allergen Reactions    Penicillins Hives and Shortness of breath    Atorvastatin Other     severe leg cramps       Outpatient Medications:  Current Outpatient Medications   Medication Instructions    aspirin 81 mg, oral, Daily    Basaglar KwikPen U-100 Insulin 100 unit/mL (3 mL) pen INJECT 35 UNITS UNDER THE SKIN TWO TIMES A DAY.    BD Teri 2nd Gen Pen Needle 32 gauge x 5/32\" needle  USE TO INJECT INSULIN TWICE DAILY    Brilinta 60 mg tablet 1 tablet, oral, 2 times daily    Crestor 40 mg tablet 1 tablet, oral, Daily    Entresto 49-51 mg tablet 1 tablet, oral, 2 times daily    ezetimibe (Zetia) 10 mg tablet 1 tablet, oral, Nightly    Farxiga 10 mg, oral, Daily    furosemide (LASIX) 20 mg, oral, Daily    gabapentin (NEURONTIN) 300 mg, oral, Nightly, Take at bedtime.    glimepiride (AMARYL) 4 mg, oral, 2 times daily    metFORMIN  mg 24 hr tablet 2 tablets, oral, 2 times daily    metoprolol succinate XL (TOPROL-XL) 100 mg, oral, Every 12 hours    Mounjaro 2.5 mg/0.5 mL pen injector INJECT 2.5 MG UNDER THE SKIN ONE TIME PER WEEK    omeprazole (PRILOSEC) 40 mg, oral, Daily, Do not crush or chew.    pen needle, diabetic 31 gauge x 1/4\" needle miscellaneous, Use  as directed    potassium chloride CR 20 mEq ER tablet 20 mEq, oral, Daily    Qvar RediHaler 160 mcg, inhalation, Daily PRN, Rinse mouth with water after use to reduce aftertaste and incidence of candidiasis. Do not swallow.    spironolactone (Aldactone) 25 mg tablet 0.5 tablets, oral, Daily       Physical Exam:  Vitals:    12/12/23 1523   BP: 104/68   BP Location: Right arm   Pulse: 86   SpO2: 98%   Weight: 104 kg (230 lb)   Height: 1.778 m (5' 10\")     Wt Readings from Last 5 Encounters:   12/12/23 104 kg (230 lb)   10/16/23 105 kg (231 lb) "   09/18/23 108 kg (239 lb)   05/24/23 107 kg (235 lb 0.7 oz)   11/16/22 109 kg (240 lb 0.3 oz)     Body mass index is 33 kg/m².   General: Well-developed well-nourished in no acute distress  HEENT: Normocephalic atraumatic  Neck: Supple, JVP is normal negative hepatojugular reflux 2+ carotid pulses without bruit  Pulmonary: Normal respiratory effort, clear to auscultation  Cardiovascular: No right ventricular heave, normal S1 and S2, no murmurs rubs or gallops  Abdomen: Soft nontender nondistended  Extremities: Warm 1+ bilateral ankle edema 2+ radial pulses bilaterally   Neurologic: Alert and oriented x3  Psychiatric: Normal mood and affect     Last Labs:  CMP:  Recent Labs     09/23/23  1028 11/18/22  1153 10/14/22  1346    142 138   K 4.7 4.3 5.2    106 104   CO2 20* 22 21   ANIONGAP 17 18 18   BUN 28* 23 26*   CREATININE 1.13* 1.07* 1.00   GLUCOSE 165* 241* 213*     Recent Labs     09/23/23  1028 04/19/22  1302 09/22/21  1255   ALBUMIN 4.6 4.4 4.7   ALKPHOS 70 89 79   ALT 25 17 19   AST 29 18 19   BILITOT 0.5 0.7 0.6     CBC:  Recent Labs     09/23/23  1028 11/18/22  1153 04/19/22  1302   WBC 6.0 5.4 6.7   HGB 14.7 15.2 14.6   HCT 46.2* 46.6* 44.3    291 240   MCV 94 95 91     COAG:   Recent Labs     06/23/21  0637 06/22/21  1937   INR 1.0 1.1     HEME/ENDO:  Recent Labs     09/23/23  1028 11/18/22  1153 04/19/22  1302   TSH 2.43  --   --    HGBA1C 8.4* 9.3* 8.9*      CARDIAC:   Recent Labs     07/18/19  1742   BNP 54     Recent Labs     09/23/23  1028 05/19/23  1333 11/18/22  1153   CHOL 211* 138 168   LDLF - - 62   HDL 47.6 37.7* 47.3   TRIG 419* 440* 295*       Last Cardiology Tests:  ECG:  An electrocardiogram performed today reviewed shows sinus rhythm septal infarct right bundle branch block.    Echo:  March 17, 2021  CONCLUSIONS:   1. The left ventricular systolic function is moderately decreased with a 35% estimated ejection fraction.   2. Mid and apical anterior septum, apex, and mid  and apical anterior wall are abnormal.   3. Spectral Doppler shows an impaired relaxation pattern of left ventricular diastolic filling.          Cath:  July 19, 2019  Coronary Lesion Summary:  Vessel   Stenosis   Vessel Segment  LAD    30% stenosis      mid  RCA    40% stenosis    proximal  CONCLUSIONS:   1. Nonobstructive CAD in a right dominant system.   2. Patent stent in proximal LAD.   3. Elevated left and right heart filling pressures.   4. Mild pulmonary hypertension.   5. Preserved cardiac index.   6. No evidence of intracardiac shunt.   7. No aortic stenosis.      October 10, 2016  Coronary Lesion Summary:  Vessel       Stenosis    Vessel Segment  LAD        100% stenosis    proximal  Circumflex 30% stenosis      distal  CONCLUSIONS:   1. Culprit vessel(s): left anterior descending.   2. Successful OCT guided PCI of the proximal LAD with a 3.5 x 28mm Xience Alpine JAQUI postdilated to 4.0mm.   3. Severe single vessel CAD in a right dominant system.   4. Elevated LVEDP.   5. No aortic stenosis.      Stress Test:  Stress Results:  No results found for this or any previous visit from the past 365 days.         Cardiac Imaging:  CT of the chest without contrast 2/28/2023  IMPRESSION:  1. Stable postsurgical changes of left upper lobe and left lower lobe  wedge resection with unchanged platelike and nodular thickening along  the left lower lobe surgical bed. This likely represents postsurgical  scarring.  2. Unchanged innumerable bilateral solid noncalcified pulmonary  nodules measuring up to 5 mm. Continued attention on follow-up  imaging is recommended.  3. Moderate coronary artery calcification with suggestion of coronary  artery stent. Suggestion of sequela of prior infarction in the LAD  territory    PVR December 30, 2021  CONCLUSIONS:     Right Lower PVR: No evidence of arterial occlusive disease in the right lower extremity at rest. Normal digital perfusion noted. Triphasic flow is noted in the right  common femoral artery, right posterior tibial artery and right dorsalis pedis artery.     Left Lower PVR: No evidence of arterial occlusive disease in the left lower extremity at rest. Normal digital perfusion noted. Triphasic flow is noted in the left common femoral artery, left posterior tibial artery and left dorsalis pedis artery.    Assessment/Plan   Diagnoses and all orders for this visit:  CAD in native artery  -     ECG 12 lead (Clinic Performed)  -     Lipid panel; Future  -     Transthoracic echo (TTE) complete; Future  -     perflutren lipid microspheres (Definity) injection 0.5-10 mL of dilution  -     perflutren protein A microsphere (Optison) injection 0.5 mL  Chest pain, unspecified type  -     ECG 12 lead (Clinic Performed)  Benign essential hypertension  Cardiomyopathy, ischemic  -     ECG 12 lead (Clinic Performed)  -     Lipid panel; Future  -     Transthoracic echo (TTE) complete; Future  -     perflutren lipid microspheres (Definity) injection 0.5-10 mL of dilution  -     perflutren protein A microsphere (Optison) injection 0.5 mL  Mixed hyperlipidemia  Chronic systolic congestive heart failure (CMS/HCC)  -     ECG 12 lead (Clinic Performed)  -     Lipid panel; Future  -     Transthoracic echo (TTE) complete; Future  -     perflutren lipid microspheres (Definity) injection 0.5-10 mL of dilution  -     perflutren protein A microsphere (Optison) injection 0.5 mL    In summary Ms. Clarke is a pleasant 63 year-old white female past medical history significant for type II insulin-requiring diabetes, hypertension, hyperlipidemia, and coronary artery disease with non-ST elevation myocardial infarction (2/2015), anterior ST elevation MI s/p PCI with drug eluding stent to proximal LAD on 10/10/16, with residual ischemic cardiomyopathy EF 30% s/p ICD.  She is asymptomatic from a cardiac perspective.  Her episodes of chest discomfort last month were noncardiac.  Her last lipid profile was not at goal.   I did discuss adding a PCSK9 inhibitor.  She declined.  She will continue her efforts with regard to diet, exercise, weight loss.  We will recheck a lipid profile in 2 months.  She is euvolemic on exam.  She should continue her current cardiovascular medications.  We will see her back in follow-up in 6 months with an echo.      Orders:  Orders Placed This Encounter   Procedures    Lipid panel    ECG 12 lead (Clinic Performed)    Transthoracic echo (TTE) complete      Followup Appts:  Future Appointments   Date Time Provider Department Center   12/15/2023  7:30 AM [id number]   12/18/2023  9:15 AM Roberto Stevenson MD [id number]           ____________________________________________________________  Elías Hutchison MD  [facility]  [facility]

## 2023-12-12 NOTE — PATIENT INSTRUCTIONS
Continue to work on exercise, diet, and weight loss.  Check a lipid profile in 2 months.  Follow-up in 6 months with an echo.

## 2023-12-14 NOTE — ADDENDUM NOTE
Encounter addended by: Angeline Thomas RN on: 12/14/2023 10:00 AM   Actions taken: Imaging Exam ended

## 2023-12-15 ENCOUNTER — ANCILLARY PROCEDURE (OUTPATIENT)
Dept: RADIOLOGY | Facility: CLINIC | Age: 63
End: 2023-12-15
Payer: COMMERCIAL

## 2023-12-15 DIAGNOSIS — Z12.31 SCREENING MAMMOGRAM FOR BREAST CANCER: ICD-10-CM

## 2023-12-15 PROCEDURE — 77067 SCR MAMMO BI INCL CAD: CPT

## 2023-12-15 PROCEDURE — 77063 BREAST TOMOSYNTHESIS BI: CPT | Mod: BILATERAL PROCEDURE | Performed by: RADIOLOGY

## 2023-12-15 PROCEDURE — 77067 SCR MAMMO BI INCL CAD: CPT | Mod: BILATERAL PROCEDURE | Performed by: RADIOLOGY

## 2023-12-18 ENCOUNTER — OFFICE VISIT (OUTPATIENT)
Dept: OTOLARYNGOLOGY | Facility: CLINIC | Age: 63
End: 2023-12-18
Payer: COMMERCIAL

## 2023-12-18 VITALS — WEIGHT: 237 LBS | BODY MASS INDEX: 33.93 KG/M2 | HEIGHT: 70 IN

## 2023-12-18 DIAGNOSIS — K13.70 MOUTH LESION: Primary | ICD-10-CM

## 2023-12-18 PROCEDURE — 99212 OFFICE O/P EST SF 10 MIN: CPT | Performed by: OTOLARYNGOLOGY

## 2023-12-18 PROCEDURE — 88304 TISSUE EXAM BY PATHOLOGIST: CPT | Performed by: STUDENT IN AN ORGANIZED HEALTH CARE EDUCATION/TRAINING PROGRAM

## 2023-12-18 PROCEDURE — 3052F HG A1C>EQUAL 8.0%<EQUAL 9.0%: CPT | Performed by: OTOLARYNGOLOGY

## 2023-12-18 PROCEDURE — 88304 TISSUE EXAM BY PATHOLOGIST: CPT

## 2023-12-18 PROCEDURE — 1036F TOBACCO NON-USER: CPT | Performed by: OTOLARYNGOLOGY

## 2023-12-18 PROCEDURE — 40490 BIOPSY OF LIP: CPT | Performed by: OTOLARYNGOLOGY

## 2023-12-18 ASSESSMENT — PATIENT HEALTH QUESTIONNAIRE - PHQ9
1. LITTLE INTEREST OR PLEASURE IN DOING THINGS: NOT AT ALL
SUM OF ALL RESPONSES TO PHQ9 QUESTIONS 1 AND 2: 0
2. FEELING DOWN, DEPRESSED OR HOPELESS: NOT AT ALL

## 2023-12-18 NOTE — PROGRESS NOTES
ENT New Patient Visit    Chief complaint: right lip lesion    History Of Present Illness  Pj Clarke is a 63 y.o. female presents for evaluation of a right lower lip mass. This has been present for 6 weeks. Seems to swell and decrease in size. Was sore briefly. There's also a smaller lesion on the left but no pain there. Never had this before. Not a smoker. Is on blood thinners for CAD/stent.    12-18-23 FU: the prior lip biopsy was mucocele, it has healed fine. She would like the left one removed.     Past Medical History  She has a past medical history of Acute ischemic heart disease, unspecified (CMS/HCC), Breast cyst (had 1 as a teenager.), Dermatochalasis of unspecified eye, unspecified eyelid (11/13/2018), Essential (primary) hypertension (11/16/2022), Personal history of other mental and behavioral disorders, and Pure hyperglyceridemia (11/28/2016).    Surgical History  She has a past surgical history that includes Cholecystectomy (06/19/2013); Other surgical history (06/29/2021); Gallbladder surgery (05/11/2016); Tonsillectomy (05/11/2016); Hysterectomy (02/11/2015); Other surgical history (01/03/2017); and Breast cyst aspiration (when i was a young teenager.).     Social History  She reports that she has never smoked. She has never used smokeless tobacco. She reports current alcohol use. She reports that she does not currently use drugs.    Family History  Family History   Problem Relation Name Age of Onset    Other (Malignant neoplasm) Mother      Other (Malignant neoplasm) Father      Colon cancer Mother's Sister      Other (Family history of diabetes mellitus) Other          Allergies  Penicillins, Atorvastatin, and Metformin hcl    Review of Systems     Physical Exam:  CONSTITUTIONAL:  No acute distress  VOICE:  No hoarseness or other abnormality  RESPIRATION:  Breathing comfortably, no stridor  CV:  No clubbing/cyanosis/edema in hands  EYES:  EOM intact, sclera normal  NEURO:  Alert and  "oriented times 3, Cranial nerves II-XII grossly intact and symmetric bilaterally  HEAD AND FACE:  Symmetric facial features, no masses or lesions, sinuses non-tender to palpation  SALIVARY GLANDS:  Parotid and submandibular glands normal bilaterally  EARS:  Normal external ears, external auditory canals, and TMs to otoscopy, normal hearing to whispered voice.  NOSE:  External nose midline, anterior rhinoscopy is normal with limited visualization to the anterior aspect of the interior turbinates, no bleeding or drainage, no lesions  ORAL CAVITY/OROPHARYNX/LIPS:  right lower lip healed prior excision; left lower lip with fibroma appearing lesion, smaller than right  PHARYNGEAL WALLS:  No masses or lesions  NECK/LYMPH:  No LAD, no thyroid masses, trachea midline  SKIN:  Neck skin is without scar or injury  PSYCH:  Alert and oriented with appropriate mood and affect    Procedure:  Lip biopsy  Left ower lip anesthetized with 1% lidocaine with 1:100,000 epinephrine  An fusiform incision was made over the  small lesion  Scissors were used to transect this at the base Then we dissected submucosally to remove what appeared to be swollen minor salivary glands  The incision was closed using running lock 4-0 chromic  Pt tolerated well    Last Recorded Vitals  Height 1.778 m (5' 10\"), weight 108 kg (237 lb).     Assessment/Plan   64 yo woman with what appears to be an obstructed right lower lip minor salivary gland s/p excision and well healed, left fibroma or another obstructed gland  -follow up path      Roberto Stevenson MD    "

## 2023-12-22 ENCOUNTER — APPOINTMENT (OUTPATIENT)
Dept: OTOLARYNGOLOGY | Facility: CLINIC | Age: 63
End: 2023-12-22
Payer: COMMERCIAL

## 2023-12-30 DIAGNOSIS — E78.00 PURE HYPERCHOLESTEROLEMIA: Primary | ICD-10-CM

## 2023-12-30 DIAGNOSIS — I25.10 CAD IN NATIVE ARTERY: ICD-10-CM

## 2023-12-31 DIAGNOSIS — E78.5 HYPERLIPIDEMIA, UNSPECIFIED HYPERLIPIDEMIA TYPE: Primary | ICD-10-CM

## 2023-12-31 RX ORDER — ROSUVASTATIN CALCIUM 40 MG/1
40 TABLET, FILM COATED ORAL DAILY
Qty: 90 TABLET | Refills: 0 | Status: SHIPPED | OUTPATIENT
Start: 2023-12-31 | End: 2024-04-06 | Stop reason: WASHOUT

## 2023-12-31 NOTE — PROGRESS NOTES
Paged by patient complaining that her Crestor was not refilled  Explained to patient that the medication was prescribed by her cardiologist and the refill was directed and pended for cardiology.    I refilled the Crestor for her today.  She then said morning glucose running 86 to 130 and asked if her basaglar should be adjusted  Explained goal fasting morning glucose is between 100 to 130  Takes 35 units bid, decreased by 2 units HS basaglar, she was instructed to f/up with covering provider

## 2024-01-02 RX ORDER — ROSUVASTATIN CALCIUM 40 MG/1
40 TABLET, FILM COATED ORAL DAILY
Qty: 90 TABLET | Refills: 3 | Status: SHIPPED | OUTPATIENT
Start: 2024-01-02 | End: 2024-04-12 | Stop reason: SDUPTHER

## 2024-01-15 ENCOUNTER — APPOINTMENT (OUTPATIENT)
Dept: OTOLARYNGOLOGY | Facility: CLINIC | Age: 64
End: 2024-01-15
Payer: COMMERCIAL

## 2024-01-16 DIAGNOSIS — Z79.4 TYPE 2 DIABETES MELLITUS WITH OTHER CIRCULATORY COMPLICATION, WITH LONG-TERM CURRENT USE OF INSULIN (MULTI): Primary | ICD-10-CM

## 2024-01-16 DIAGNOSIS — E11.59 TYPE 2 DIABETES MELLITUS WITH OTHER CIRCULATORY COMPLICATION, WITH LONG-TERM CURRENT USE OF INSULIN (MULTI): Primary | ICD-10-CM

## 2024-01-16 RX ORDER — PEN NEEDLE, DIABETIC 32GX 5/32"
NEEDLE, DISPOSABLE MISCELLANEOUS
Qty: 200 EACH | Refills: 0 | Status: SHIPPED | OUTPATIENT
Start: 2024-01-16 | End: 2024-05-27

## 2024-01-18 ENCOUNTER — HOSPITAL ENCOUNTER (OUTPATIENT)
Facility: HOSPITAL | Age: 64
Setting detail: OBSERVATION
Discharge: HOME | End: 2024-01-20
Attending: STUDENT IN AN ORGANIZED HEALTH CARE EDUCATION/TRAINING PROGRAM | Admitting: INTERNAL MEDICINE
Payer: COMMERCIAL

## 2024-01-18 ENCOUNTER — ANCILLARY PROCEDURE (OUTPATIENT)
Dept: RADIOLOGY | Facility: CLINIC | Age: 64
End: 2024-01-18
Payer: COMMERCIAL

## 2024-01-18 ENCOUNTER — APPOINTMENT (OUTPATIENT)
Dept: RADIOLOGY | Facility: HOSPITAL | Age: 64
End: 2024-01-18
Payer: COMMERCIAL

## 2024-01-18 DIAGNOSIS — R26.2 DIFFICULTY IN WALKING, NOT ELSEWHERE CLASSIFIED: ICD-10-CM

## 2024-01-18 DIAGNOSIS — E10.9 TYPE 1 DIABETES MELLITUS WITHOUT COMPLICATIONS (MULTI): ICD-10-CM

## 2024-01-18 DIAGNOSIS — S92.422A DISPLACED FRACTURE OF DISTAL PHALANX OF LEFT GREAT TOE, INITIAL ENCOUNTER FOR CLOSED FRACTURE: Primary | ICD-10-CM

## 2024-01-18 DIAGNOSIS — L03.119 CELLULITIS OF FOOT: ICD-10-CM

## 2024-01-18 DIAGNOSIS — S92.525A NONDISPLACED FRACTURE OF MIDDLE PHALANX OF LEFT LESSER TOE(S), INITIAL ENCOUNTER FOR CLOSED FRACTURE: ICD-10-CM

## 2024-01-18 DIAGNOSIS — M79.675 PAIN IN LEFT TOE(S): ICD-10-CM

## 2024-01-18 LAB
ALBUMIN SERPL BCP-MCNC: 4 G/DL (ref 3.4–5)
ALP SERPL-CCNC: 97 U/L (ref 33–136)
ALT SERPL W P-5'-P-CCNC: 26 U/L (ref 7–45)
ANION GAP SERPL CALC-SCNC: 17 MMOL/L (ref 10–20)
AST SERPL W P-5'-P-CCNC: 43 U/L (ref 9–39)
BASOPHILS # BLD AUTO: 0.02 X10*3/UL (ref 0–0.1)
BASOPHILS NFR BLD AUTO: 0.3 %
BILIRUB SERPL-MCNC: 0.8 MG/DL (ref 0–1.2)
BUN SERPL-MCNC: 33 MG/DL (ref 6–23)
CALCIUM SERPL-MCNC: 9.4 MG/DL (ref 8.6–10.3)
CHLORIDE SERPL-SCNC: 105 MMOL/L (ref 98–107)
CO2 SERPL-SCNC: 18 MMOL/L (ref 21–32)
CREAT SERPL-MCNC: 1.2 MG/DL (ref 0.5–1.05)
CRP SERPL-MCNC: 1 MG/DL
EGFRCR SERPLBLD CKD-EPI 2021: 51 ML/MIN/1.73M*2
EOSINOPHIL # BLD AUTO: 0.08 X10*3/UL (ref 0–0.7)
EOSINOPHIL NFR BLD AUTO: 1 %
ERYTHROCYTE [DISTWIDTH] IN BLOOD BY AUTOMATED COUNT: 15 % (ref 11.5–14.5)
ERYTHROCYTE [SEDIMENTATION RATE] IN BLOOD BY WESTERGREN METHOD: 12 MM/H (ref 0–30)
GLUCOSE SERPL-MCNC: 167 MG/DL (ref 74–99)
HCT VFR BLD AUTO: 43.3 % (ref 36–46)
HGB BLD-MCNC: 14.2 G/DL (ref 12–16)
IMM GRANULOCYTES # BLD AUTO: 0.03 X10*3/UL (ref 0–0.7)
IMM GRANULOCYTES NFR BLD AUTO: 0.4 % (ref 0–0.9)
LACTATE SERPL-SCNC: 1.7 MMOL/L (ref 0.4–2)
LYMPHOCYTES # BLD AUTO: 1.5 X10*3/UL (ref 1.2–4.8)
LYMPHOCYTES NFR BLD AUTO: 18.9 %
MCH RBC QN AUTO: 30 PG (ref 26–34)
MCHC RBC AUTO-ENTMCNC: 32.8 G/DL (ref 32–36)
MCV RBC AUTO: 92 FL (ref 80–100)
MONOCYTES # BLD AUTO: 0.67 X10*3/UL (ref 0.1–1)
MONOCYTES NFR BLD AUTO: 8.4 %
NEUTROPHILS # BLD AUTO: 5.63 X10*3/UL (ref 1.2–7.7)
NEUTROPHILS NFR BLD AUTO: 71 %
NRBC BLD-RTO: 0 /100 WBCS (ref 0–0)
PLATELET # BLD AUTO: 284 X10*3/UL (ref 150–450)
POTASSIUM SERPL-SCNC: 4.3 MMOL/L (ref 3.5–5.3)
PROT SERPL-MCNC: 6.4 G/DL (ref 6.4–8.2)
RBC # BLD AUTO: 4.73 X10*6/UL (ref 4–5.2)
SODIUM SERPL-SCNC: 136 MMOL/L (ref 136–145)
WBC # BLD AUTO: 7.9 X10*3/UL (ref 4.4–11.3)

## 2024-01-18 PROCEDURE — 73630 X-RAY EXAM OF FOOT: CPT | Mod: LEFT SIDE | Performed by: RADIOLOGY

## 2024-01-18 PROCEDURE — 28495 TREAT BIG TOE FRACTURE: CPT | Mod: TA

## 2024-01-18 PROCEDURE — 2500000004 HC RX 250 GENERAL PHARMACY W/ HCPCS (ALT 636 FOR OP/ED): Performed by: STUDENT IN AN ORGANIZED HEALTH CARE EDUCATION/TRAINING PROGRAM

## 2024-01-18 PROCEDURE — 2500000002 HC RX 250 W HCPCS SELF ADMINISTERED DRUGS (ALT 637 FOR MEDICARE OP, ALT 636 FOR OP/ED): Performed by: PHYSICIAN ASSISTANT

## 2024-01-18 PROCEDURE — 86140 C-REACTIVE PROTEIN: CPT | Performed by: PHYSICIAN ASSISTANT

## 2024-01-18 PROCEDURE — 73552 X-RAY EXAM OF FEMUR 2/>: CPT | Mod: LEFT SIDE | Performed by: RADIOLOGY

## 2024-01-18 PROCEDURE — G0378 HOSPITAL OBSERVATION PER HR: HCPCS

## 2024-01-18 PROCEDURE — 80053 COMPREHEN METABOLIC PANEL: CPT | Performed by: PHYSICIAN ASSISTANT

## 2024-01-18 PROCEDURE — 96365 THER/PROPH/DIAG IV INF INIT: CPT | Performed by: INTERNAL MEDICINE

## 2024-01-18 PROCEDURE — 2500000004 HC RX 250 GENERAL PHARMACY W/ HCPCS (ALT 636 FOR OP/ED): Performed by: PHYSICIAN ASSISTANT

## 2024-01-18 PROCEDURE — 73564 X-RAY EXAM KNEE 4 OR MORE: CPT | Mod: LEFT SIDE | Performed by: RADIOLOGY

## 2024-01-18 PROCEDURE — 99285 EMERGENCY DEPT VISIT HI MDM: CPT | Performed by: STUDENT IN AN ORGANIZED HEALTH CARE EDUCATION/TRAINING PROGRAM

## 2024-01-18 PROCEDURE — 73552 X-RAY EXAM OF FEMUR 2/>: CPT | Mod: LT,FR

## 2024-01-18 PROCEDURE — 73564 X-RAY EXAM KNEE 4 OR MORE: CPT | Mod: LT

## 2024-01-18 PROCEDURE — 2500000001 HC RX 250 WO HCPCS SELF ADMINISTERED DRUGS (ALT 637 FOR MEDICARE OP): Performed by: PHYSICIAN ASSISTANT

## 2024-01-18 PROCEDURE — 82947 ASSAY GLUCOSE BLOOD QUANT: CPT | Mod: 59

## 2024-01-18 PROCEDURE — 73630 X-RAY EXAM OF FOOT: CPT | Mod: LT,77

## 2024-01-18 PROCEDURE — 73630 X-RAY EXAM OF FOOT: CPT | Mod: LT

## 2024-01-18 PROCEDURE — 83605 ASSAY OF LACTIC ACID: CPT | Performed by: PHYSICIAN ASSISTANT

## 2024-01-18 PROCEDURE — 36415 COLL VENOUS BLD VENIPUNCTURE: CPT | Performed by: PHYSICIAN ASSISTANT

## 2024-01-18 PROCEDURE — 85025 COMPLETE CBC W/AUTO DIFF WBC: CPT | Performed by: PHYSICIAN ASSISTANT

## 2024-01-18 PROCEDURE — 99221 1ST HOSP IP/OBS SF/LOW 40: CPT | Performed by: PHYSICIAN ASSISTANT

## 2024-01-18 PROCEDURE — 85652 RBC SED RATE AUTOMATED: CPT | Performed by: PHYSICIAN ASSISTANT

## 2024-01-18 PROCEDURE — 73502 X-RAY EXAM HIP UNI 2-3 VIEWS: CPT | Mod: LT

## 2024-01-18 PROCEDURE — 73502 X-RAY EXAM HIP UNI 2-3 VIEWS: CPT | Mod: LEFT SIDE | Performed by: RADIOLOGY

## 2024-01-18 PROCEDURE — 87040 BLOOD CULTURE FOR BACTERIA: CPT | Mod: AHULAB | Performed by: PHYSICIAN ASSISTANT

## 2024-01-18 RX ORDER — METOPROLOL SUCCINATE 50 MG/1
100 TABLET, EXTENDED RELEASE ORAL EVERY 12 HOURS
Status: DISCONTINUED | OUTPATIENT
Start: 2024-01-19 | End: 2024-01-20 | Stop reason: HOSPADM

## 2024-01-18 RX ORDER — SPIRONOLACTONE 25 MG/1
12.5 TABLET ORAL DAILY
Status: DISCONTINUED | OUTPATIENT
Start: 2024-01-19 | End: 2024-01-20 | Stop reason: HOSPADM

## 2024-01-18 RX ORDER — OXYCODONE AND ACETAMINOPHEN 5; 325 MG/1; MG/1
1 TABLET ORAL EVERY 6 HOURS PRN
Status: DISCONTINUED | OUTPATIENT
Start: 2024-01-18 | End: 2024-01-20 | Stop reason: HOSPADM

## 2024-01-18 RX ORDER — CLINDAMYCIN HYDROCHLORIDE 150 MG/1
600 CAPSULE ORAL EVERY 8 HOURS
Status: DISCONTINUED | OUTPATIENT
Start: 2024-01-18 | End: 2024-01-20 | Stop reason: HOSPADM

## 2024-01-18 RX ORDER — GLIMEPIRIDE 2 MG/1
4 TABLET ORAL 2 TIMES DAILY
Status: DISCONTINUED | OUTPATIENT
Start: 2024-01-18 | End: 2024-01-20 | Stop reason: HOSPADM

## 2024-01-18 RX ORDER — METFORMIN HYDROCHLORIDE 500 MG/1
1000 TABLET, EXTENDED RELEASE ORAL 2 TIMES DAILY
Status: DISCONTINUED | OUTPATIENT
Start: 2024-01-18 | End: 2024-01-20 | Stop reason: HOSPADM

## 2024-01-18 RX ORDER — DOCUSATE SODIUM 100 MG/1
100 CAPSULE, LIQUID FILLED ORAL 2 TIMES DAILY
Status: DISCONTINUED | OUTPATIENT
Start: 2024-01-18 | End: 2024-01-19

## 2024-01-18 RX ORDER — BUDESONIDE 0.5 MG/2ML
0.5 INHALANT ORAL
Status: DISCONTINUED | OUTPATIENT
Start: 2024-01-18 | End: 2024-01-20 | Stop reason: HOSPADM

## 2024-01-18 RX ORDER — PANTOPRAZOLE SODIUM 40 MG/1
40 TABLET, DELAYED RELEASE ORAL
Status: DISCONTINUED | OUTPATIENT
Start: 2024-01-19 | End: 2024-01-20 | Stop reason: HOSPADM

## 2024-01-18 RX ORDER — ASPIRIN 81 MG/1
81 TABLET ORAL DAILY
Status: DISCONTINUED | OUTPATIENT
Start: 2024-01-19 | End: 2024-01-20 | Stop reason: HOSPADM

## 2024-01-18 RX ORDER — ROSUVASTATIN CALCIUM 20 MG/1
40 TABLET, COATED ORAL DAILY
Status: DISCONTINUED | OUTPATIENT
Start: 2024-01-19 | End: 2024-01-18

## 2024-01-18 RX ORDER — EZETIMIBE 10 MG/1
10 TABLET ORAL NIGHTLY
Status: DISCONTINUED | OUTPATIENT
Start: 2024-01-18 | End: 2024-01-20 | Stop reason: HOSPADM

## 2024-01-18 RX ORDER — ROSUVASTATIN CALCIUM 20 MG/1
40 TABLET, COATED ORAL DAILY
Status: DISCONTINUED | OUTPATIENT
Start: 2024-01-19 | End: 2024-01-19

## 2024-01-18 RX ORDER — ACETAMINOPHEN 325 MG/1
950 TABLET ORAL EVERY 6 HOURS PRN
Status: DISCONTINUED | OUTPATIENT
Start: 2024-01-18 | End: 2024-01-20 | Stop reason: HOSPADM

## 2024-01-18 RX ORDER — GABAPENTIN 300 MG/1
300 CAPSULE ORAL NIGHTLY
Status: DISCONTINUED | OUTPATIENT
Start: 2024-01-18 | End: 2024-01-20 | Stop reason: HOSPADM

## 2024-01-18 RX ORDER — DAPAGLIFLOZIN 10 MG/1
10 TABLET, FILM COATED ORAL DAILY
Status: DISCONTINUED | OUTPATIENT
Start: 2024-01-19 | End: 2024-01-20 | Stop reason: HOSPADM

## 2024-01-18 RX ORDER — CLINDAMYCIN PHOSPHATE 600 MG/50ML
600 INJECTION, SOLUTION INTRAVENOUS ONCE
Status: COMPLETED | OUTPATIENT
Start: 2024-01-18 | End: 2024-01-18

## 2024-01-18 RX ORDER — FUROSEMIDE 20 MG/1
20 TABLET ORAL DAILY
Status: DISCONTINUED | OUTPATIENT
Start: 2024-01-19 | End: 2024-01-20 | Stop reason: HOSPADM

## 2024-01-18 RX ORDER — TALC
3 POWDER (GRAM) TOPICAL
Status: DISCONTINUED | OUTPATIENT
Start: 2024-01-19 | End: 2024-01-19

## 2024-01-18 RX ORDER — POTASSIUM CHLORIDE 20 MEQ/1
20 TABLET, EXTENDED RELEASE ORAL DAILY
Status: DISCONTINUED | OUTPATIENT
Start: 2024-01-19 | End: 2024-01-20 | Stop reason: HOSPADM

## 2024-01-18 RX ADMIN — OXYCODONE HYDROCHLORIDE AND ACETAMINOPHEN 1 TABLET: 5; 325 TABLET ORAL at 23:04

## 2024-01-18 RX ADMIN — SODIUM CHLORIDE 1000 ML: 9 INJECTION, SOLUTION INTRAVENOUS at 20:25

## 2024-01-18 RX ADMIN — DOCUSATE SODIUM 100 MG: 100 CAPSULE, LIQUID FILLED ORAL at 23:04

## 2024-01-18 RX ADMIN — CLINDAMYCIN HYDROCHLORIDE 600 MG: 150 CAPSULE ORAL at 22:14

## 2024-01-18 RX ADMIN — TICAGRELOR 60 MG: 60 TABLET ORAL at 22:14

## 2024-01-18 RX ADMIN — EZETIMIBE 10 MG: 10 TABLET ORAL at 22:14

## 2024-01-18 RX ADMIN — GLIMEPIRIDE 4 MG: 2 TABLET ORAL at 22:14

## 2024-01-18 RX ADMIN — CLINDAMYCIN PHOSPHATE 600 MG: 600 INJECTION, SOLUTION INTRAVENOUS at 18:44

## 2024-01-18 RX ADMIN — GABAPENTIN 300 MG: 300 CAPSULE ORAL at 22:14

## 2024-01-18 SDOH — SOCIAL STABILITY: SOCIAL INSECURITY: ABUSE: ADULT

## 2024-01-18 SDOH — SOCIAL STABILITY: SOCIAL INSECURITY: DOES ANYONE TRY TO KEEP YOU FROM HAVING/CONTACTING OTHER FRIENDS OR DOING THINGS OUTSIDE YOUR HOME?: NO

## 2024-01-18 SDOH — SOCIAL STABILITY: SOCIAL INSECURITY: ARE YOU OR HAVE YOU BEEN THREATENED OR ABUSED PHYSICALLY, EMOTIONALLY, OR SEXUALLY BY ANYONE?: NO

## 2024-01-18 SDOH — SOCIAL STABILITY: SOCIAL INSECURITY: WERE YOU ABLE TO COMPLETE ALL THE BEHAVIORAL HEALTH SCREENINGS?: YES

## 2024-01-18 SDOH — SOCIAL STABILITY: SOCIAL INSECURITY: DO YOU FEEL ANYONE HAS EXPLOITED OR TAKEN ADVANTAGE OF YOU FINANCIALLY OR OF YOUR PERSONAL PROPERTY?: NO

## 2024-01-18 SDOH — SOCIAL STABILITY: SOCIAL INSECURITY: ARE THERE ANY APPARENT SIGNS OF INJURIES/BEHAVIORS THAT COULD BE RELATED TO ABUSE/NEGLECT?: NO

## 2024-01-18 SDOH — SOCIAL STABILITY: SOCIAL INSECURITY: HAS ANYONE EVER THREATENED TO HURT YOUR FAMILY OR YOUR PETS?: NO

## 2024-01-18 SDOH — SOCIAL STABILITY: SOCIAL INSECURITY: DO YOU FEEL UNSAFE GOING BACK TO THE PLACE WHERE YOU ARE LIVING?: NO

## 2024-01-18 SDOH — SOCIAL STABILITY: SOCIAL INSECURITY: HAVE YOU HAD THOUGHTS OF HARMING ANYONE ELSE?: NO

## 2024-01-18 ASSESSMENT — COGNITIVE AND FUNCTIONAL STATUS - GENERAL
DAILY ACTIVITIY SCORE: 24
MOBILITY SCORE: 24
PATIENT BASELINE BEDBOUND: NO

## 2024-01-18 ASSESSMENT — ACTIVITIES OF DAILY LIVING (ADL)
LACK_OF_TRANSPORTATION: NO
HEARING - LEFT EAR: FUNCTIONAL
HEARING - RIGHT EAR: FUNCTIONAL
ADEQUATE_TO_COMPLETE_ADL: YES
TOILETING: INDEPENDENT
JUDGMENT_ADEQUATE_SAFELY_COMPLETE_DAILY_ACTIVITIES: YES
BATHING: INDEPENDENT
FEEDING YOURSELF: INDEPENDENT
PATIENT'S MEMORY ADEQUATE TO SAFELY COMPLETE DAILY ACTIVITIES?: YES
DRESSING YOURSELF: INDEPENDENT
GROOMING: INDEPENDENT
WALKS IN HOME: INDEPENDENT

## 2024-01-18 ASSESSMENT — PAIN - FUNCTIONAL ASSESSMENT
PAIN_FUNCTIONAL_ASSESSMENT: 0-10

## 2024-01-18 ASSESSMENT — LIFESTYLE VARIABLES
SKIP TO QUESTIONS 9-10: 1
HOW OFTEN DO YOU HAVE 6 OR MORE DRINKS ON ONE OCCASION: NEVER
AUDIT-C TOTAL SCORE: 0
SUBSTANCE_ABUSE_PAST_12_MONTHS: NO
HOW OFTEN DO YOU HAVE A DRINK CONTAINING ALCOHOL: NEVER
HOW MANY STANDARD DRINKS CONTAINING ALCOHOL DO YOU HAVE ON A TYPICAL DAY: PATIENT DOES NOT DRINK
PRESCIPTION_ABUSE_PAST_12_MONTHS: NO
AUDIT-C TOTAL SCORE: 0

## 2024-01-18 ASSESSMENT — PAIN DESCRIPTION - DESCRIPTORS: DESCRIPTORS: BURNING;THROBBING

## 2024-01-18 ASSESSMENT — PAIN DESCRIPTION - LOCATION
LOCATION: TOE (COMMENT WHICH ONE)
LOCATION: FOOT

## 2024-01-18 ASSESSMENT — PATIENT HEALTH QUESTIONNAIRE - PHQ9
SUM OF ALL RESPONSES TO PHQ9 QUESTIONS 1 & 2: 0
1. LITTLE INTEREST OR PLEASURE IN DOING THINGS: NOT AT ALL
2. FEELING DOWN, DEPRESSED OR HOPELESS: NOT AT ALL

## 2024-01-18 ASSESSMENT — PAIN SCALES - GENERAL
PAINLEVEL_OUTOF10: 7
PAINLEVEL_OUTOF10: 6
PAINLEVEL_OUTOF10: 7

## 2024-01-18 ASSESSMENT — COLUMBIA-SUICIDE SEVERITY RATING SCALE - C-SSRS
2. HAVE YOU ACTUALLY HAD ANY THOUGHTS OF KILLING YOURSELF?: NO
6. HAVE YOU EVER DONE ANYTHING, STARTED TO DO ANYTHING, OR PREPARED TO DO ANYTHING TO END YOUR LIFE?: NO
1. IN THE PAST MONTH, HAVE YOU WISHED YOU WERE DEAD OR WISHED YOU COULD GO TO SLEEP AND NOT WAKE UP?: NO

## 2024-01-18 ASSESSMENT — PAIN DESCRIPTION - ORIENTATION: ORIENTATION: LEFT

## 2024-01-18 NOTE — ED TRIAGE NOTES
PT FELL ON TUESDAY D/T ICE INJURING BILAT KNEES/LEFT FOOT, PT HAD X-RAY OF LEFT FOOT AND WAS SENT TO THE ER D/T POSSIBLE CELLULITIS, PT IS ON BLOOD THINNERS, DENIES HEAD INJURY/LOC

## 2024-01-18 NOTE — ED PROVIDER NOTES
EMERGENCY MEDICINE EVALUATION NOTE    History of Present Illness     Chief Complaint:   Chief Complaint   Patient presents with    Foot Injury     LEFT       HPI: Pj Clarke is a 63 y.o. female with past medical history of diabetes insulin-dependent, hypertension, and CAD who presents with complaint of foot injury.  Patient states 2 days prior to arrival she slipped on ice and fell onto her left side.  States she did curl her left toes and initially had some pain involving her left knee, left femur, and left foot.  She states the pain did improve throughout the day although she woke up this morning with concern for possible left foot infection.  She has been erythema and tenderness throughout the left first and second toe.  She states she did see her primary care provider prior to arrival and did have a x-ray left foot and was sent due to possible cellulitis and need for further IV antibiotic treatment.    Previous History     Past Medical History:   Diagnosis Date    Acute ischemic heart disease, unspecified (CMS/HCC)     Acute coronary syndrome    Breast cyst had 1 as a teenager.    Dermatochalasis of unspecified eye, unspecified eyelid 11/13/2018    Dermatochalasis    Essential (primary) hypertension 11/16/2022    Benign essential hypertension    Personal history of other mental and behavioral disorders     History of depression    Pure hyperglyceridemia 11/28/2016    Essential hypertriglyceridemia     Past Surgical History:   Procedure Laterality Date    BREAST CYST ASPIRATION  when i was a young teenager.    CHOLECYSTECTOMY  06/19/2013    Cholecystectomy    GALLBLADDER SURGERY  05/11/2016    Gallbladder Surgery    HYSTERECTOMY  02/11/2015    Hysterectomy    OTHER SURGICAL HISTORY  06/29/2021    Lung wedge resection    OTHER SURGICAL HISTORY  01/03/2017    Implantable Cardioverter-Defibrillator    TONSILLECTOMY  05/11/2016    Tonsillectomy     Social History     Tobacco Use    Smoking status: Never     "Smokeless tobacco: Never   Substance Use Topics    Alcohol use: Yes     Comment: rarely    Drug use: Not Currently     Family History   Problem Relation Name Age of Onset    Other (Malignant neoplasm) Mother      Other (Malignant neoplasm) Father      Colon cancer Mother's Sister      Other (Family history of diabetes mellitus) Other       Allergies   Allergen Reactions    Penicillins Hives and Shortness of breath    Atorvastatin Other     severe leg cramps    Metformin Hcl Diarrhea     Only with HCL brand.   Good with ER     Current Outpatient Medications   Medication Instructions    aspirin 81 mg, oral, Daily    Basaglar KwikPen U-100 Insulin 100 unit/mL (3 mL) pen INJECT 35 UNITS UNDER THE SKIN TWO TIMES A DAY.    Brilinta 60 mg tablet 1 tablet, oral, 2 times daily    Crestor 40 mg, oral, Daily    Crestor 40 mg, oral, Daily    Entresto 49-51 mg tablet 1 tablet, oral, 2 times daily    ezetimibe (Zetia) 10 mg tablet 1 tablet, oral, Nightly    Farxiga 10 mg, oral, Daily    furosemide (LASIX) 20 mg, oral, Daily    gabapentin (NEURONTIN) 300 mg, oral, Nightly, Take at bedtime.    glimepiride (AMARYL) 4 mg, oral, 2 times daily    metFORMIN  mg 24 hr tablet 2 tablets, oral, 2 times daily    metoprolol succinate XL (TOPROL-XL) 100 mg, oral, Every 12 hours    Mounjaro 2.5 mg/0.5 mL pen injector INJECT 2.5 MG UNDER THE SKIN ONE TIME PER WEEK    omeprazole (PRILOSEC) 40 mg, oral, Daily, Do not crush or chew.    pen needle, diabetic (BD Teri 2nd Gen Pen Needle) 32 gauge x 5/32\" needle USE TO INJECT INSULIN TWICE DAILY    pen needle, diabetic 31 gauge x 1/4\" needle miscellaneous, Use  as directed    potassium chloride CR 20 mEq ER tablet 20 mEq, oral, Daily    Qvar RediHaler 160 mcg, inhalation, Daily PRN, Rinse mouth with water after use to reduce aftertaste and incidence of candidiasis. Do not swallow.    spironolactone (Aldactone) 25 mg tablet 0.5 tablets, oral, Daily       Physical Exam     Appearance: Alert, " oriented , cooperative,  in acute distress. Well nourished & well hydrated.     Skin: Intact,  dry skin, no lesions, rash, petechiae or purpura.      Eyes: PERRLA, EOMs intact,  Conjunctiva pink with no redness or exudates. Cornea & anterior chamber are clear, Eyelids without lesions. No scleral icterus.      ENT: Hearing grossly intact. External auditory canals patent, tympanic membranes intact with visible landmarks. Nares patent, mucus membranes moist. Dentition without lesions. Pharynx clear, uvula midline.      Neck: Supple, without meningismus. Thyroid not palpable. Trachea at midline. No lymphadenopathy.     Pulmonary: Clear bilaterally with good chest wall excursion. No rales, rhonchi or wheezing. No accessory muscle use or stridor.     Cardiac: Normal S1, S2 without murmur, rub, gallop or extrasystole. No JVD, Carotids without bruits.     Abdomen: Soft, nontender, active bowel sounds.  No palpable organomegaly.  No rebound or guarding.  No CVA tenderness.     Genitourinary: Exam deferred.     Musculoskeletal: Full range of motion. no edema, or deformity. Pulses full and equal. No cyanosis or clubbing.  Moderate tenderness to palpation involving the left proximal femur with no obvious deformity noted, moderate erythema and tenderness throughout the left first and second toe as well as the left foot, no obvious laceration noted, mild hematoma to the left distal first toe.  No open wounds.     Neurological:  Cranial nerves II through XII are grossly intact, finger-nose touch is normal, normal sensation, no weakness, no focal findings identified.     Psychiatric: Appropriate mood and affect.      Results     Labs Reviewed   CBC WITH AUTO DIFFERENTIAL - Abnormal       Result Value    WBC 7.9      nRBC 0.0      RBC 4.73      Hemoglobin 14.2      Hematocrit 43.3      MCV 92      MCH 30.0      MCHC 32.8      RDW 15.0 (*)     Platelets 284      Neutrophils % 71.0      Immature Granulocytes %, Automated 0.4       Lymphocytes % 18.9      Monocytes % 8.4      Eosinophils % 1.0      Basophils % 0.3      Neutrophils Absolute 5.63      Immature Granulocytes Absolute, Automated 0.03      Lymphocytes Absolute 1.50      Monocytes Absolute 0.67      Eosinophils Absolute 0.08      Basophils Absolute 0.02     COMPREHENSIVE METABOLIC PANEL - Abnormal    Glucose 167 (*)     Sodium 136      Potassium 4.3      Chloride 105      Bicarbonate 18 (*)     Anion Gap 17      Urea Nitrogen 33 (*)     Creatinine 1.20 (*)     eGFR 51 (*)     Calcium 9.4      Albumin 4.0      Alkaline Phosphatase 97      Total Protein 6.4      AST 43 (*)     Bilirubin, Total 0.8      ALT 26     C-REACTIVE PROTEIN - Abnormal    C-Reactive Protein 1.00 (*)    SEDIMENTATION RATE, AUTOMATED - Normal    Sedimentation Rate 12     LACTATE - Normal    Lactate 1.7      Narrative:     Venipuncture immediately after or during the administration of Metamizole may lead to falsely low results. Testing should be performed immediately  prior to Metamizole dosing.   BLOOD CULTURE   BLOOD CULTURE     XR hip left with pelvis when performed 2 or 3 views   Final Result   Moderate degenerative change of both hip joints.   No definite acute osseous abnormality.   If hip pain persists, suggest CT or MRI for further evaluation.   Signed by Myron Grier MD      XR femur left 2+ views   Final Result   Degenerative changes of the left hip and knee joint.   No definite acute osseous abnormality.   Signed by Myron Grier MD      XR knee left 4+ views   Final Result   1. No acute osseous abnormality identified.   2. Small joint effusion.                  Signed by: Sekou Chang 1/18/2024 7:34 PM   Dictation workstation:   [id number]      XR foot left 3+ views   Final Result   Acute fracture(s) first distal phalanx near the DIP joint, with likely   DIP joint subluxation. Remainder as above.   Signed by Fab Lopez MD            ED Course & Medical Decision Making     Medications   clindamycin  "(Cleocin) 600 mg in dextrose 5% water 50 mL (0 mg intravenous Stopped 1/18/24 1914)   sodium chloride 0.9 % bolus 1,000 mL (0 mL intravenous Stopped 1/18/24 2055)     Diagnoses as of 01/18/24 2129   Displaced fracture of distal phalanx of left great toe, initial encounter for closed fracture   Cellulitis of foot     Heart Rate:  [84-99]   Temp:  [36.3 °C (97.4 °F)]   Resp:  [17-20]   BP: ()/(54-72)   Height:  [178 cm (5' 10.08\")]   Weight:  [104 kg (230 lb)]   SpO2:  [98 %-100 %]      Pj Clarke is a 63 y.o. female with past medical history of diabetes insulin-dependent, hypertension, and CAD who presents with complaint of foot injury.  Patient with a traumatic injury to the left foot.  Concern for possible underlying fracture though physical exam is more consistent with a left foot cellulitis main involving the first and second toes with no obvious fluctuance or abscess.  Low concern for septic joint.  Patient otherwise is hemodynamically stable and afebrile.  Also tenderness noted to the left knee and femur.  Patient was covered with IV clindamycin for initial treatment as well as 1 L normal saline.  She refused need for pain medication currently.  X-rays of the left knee, left hip, and left femur as well as the left foot were completed.  This did show a acute fracture of the first distal phalanx near the DIP joint with possible joint subluxation.  Initial lab work did show baseline creatinine 1.20 with otherwise no significant electrolyte normality.  CRP at baseline level 1.0 and lactate normal.  No significant leukocytosis or anemia.  Blood cultures drawn initially.  ESR normal.  Patient informed of these findings and given her history of diabetes and left foot cellulitis she will be admitted to the observation unit for further IV antibiotics and was put in a postoperative shoe given her distal phalanx fracture with no need for emergent orthopedic consultation at this time.    Procedures "   Procedures    Diagnosis     1. Displaced fracture of distal phalanx of left great toe, initial encounter for closed fracture    2. Cellulitis of foot        Disposition     Admitted to hospitalist team, discussed differential and findings with patient as well as any family members at bedside.      ED Prescriptions    None            Harpreet Salazar DO  01/18/24 2128

## 2024-01-19 ENCOUNTER — APPOINTMENT (OUTPATIENT)
Dept: RADIOLOGY | Facility: HOSPITAL | Age: 64
End: 2024-01-19
Payer: COMMERCIAL

## 2024-01-19 LAB
ALBUMIN SERPL BCP-MCNC: 3.3 G/DL (ref 3.4–5)
ALP SERPL-CCNC: 71 U/L (ref 33–136)
ALT SERPL W P-5'-P-CCNC: 18 U/L (ref 7–45)
ANION GAP SERPL CALC-SCNC: 13 MMOL/L (ref 10–20)
AST SERPL W P-5'-P-CCNC: 22 U/L (ref 9–39)
BASOPHILS # BLD AUTO: 0.03 X10*3/UL (ref 0–0.1)
BASOPHILS NFR BLD AUTO: 0.5 %
BILIRUB SERPL-MCNC: 0.6 MG/DL (ref 0–1.2)
BUN SERPL-MCNC: 30 MG/DL (ref 6–23)
CALCIUM SERPL-MCNC: 8.3 MG/DL (ref 8.6–10.3)
CHLORIDE SERPL-SCNC: 108 MMOL/L (ref 98–107)
CO2 SERPL-SCNC: 19 MMOL/L (ref 21–32)
CREAT SERPL-MCNC: 1 MG/DL (ref 0.5–1.05)
EGFRCR SERPLBLD CKD-EPI 2021: 63 ML/MIN/1.73M*2
EOSINOPHIL # BLD AUTO: 0.07 X10*3/UL (ref 0–0.7)
EOSINOPHIL NFR BLD AUTO: 1.2 %
ERYTHROCYTE [DISTWIDTH] IN BLOOD BY AUTOMATED COUNT: 15 % (ref 11.5–14.5)
EST. AVERAGE GLUCOSE BLD GHB EST-MCNC: 174 MG/DL
GLUCOSE BLD MANUAL STRIP-MCNC: 110 MG/DL (ref 74–99)
GLUCOSE BLD MANUAL STRIP-MCNC: 168 MG/DL (ref 74–99)
GLUCOSE BLD MANUAL STRIP-MCNC: 203 MG/DL (ref 74–99)
GLUCOSE BLD MANUAL STRIP-MCNC: 72 MG/DL (ref 74–99)
GLUCOSE SERPL-MCNC: 96 MG/DL (ref 74–99)
HBA1C MFR BLD: 7.7 %
HCT VFR BLD AUTO: 38.8 % (ref 36–46)
HGB BLD-MCNC: 12.4 G/DL (ref 12–16)
IMM GRANULOCYTES # BLD AUTO: 0.02 X10*3/UL (ref 0–0.7)
IMM GRANULOCYTES NFR BLD AUTO: 0.3 % (ref 0–0.9)
LYMPHOCYTES # BLD AUTO: 1.59 X10*3/UL (ref 1.2–4.8)
LYMPHOCYTES NFR BLD AUTO: 27.7 %
MAGNESIUM SERPL-MCNC: 1.6 MG/DL (ref 1.6–2.4)
MCH RBC QN AUTO: 29.4 PG (ref 26–34)
MCHC RBC AUTO-ENTMCNC: 32 G/DL (ref 32–36)
MCV RBC AUTO: 92 FL (ref 80–100)
MONOCYTES # BLD AUTO: 0.7 X10*3/UL (ref 0.1–1)
MONOCYTES NFR BLD AUTO: 12.2 %
NEUTROPHILS # BLD AUTO: 3.32 X10*3/UL (ref 1.2–7.7)
NEUTROPHILS NFR BLD AUTO: 58.1 %
NRBC BLD-RTO: 0 /100 WBCS (ref 0–0)
PLATELET # BLD AUTO: 248 X10*3/UL (ref 150–450)
POTASSIUM SERPL-SCNC: 3.6 MMOL/L (ref 3.5–5.3)
PROT SERPL-MCNC: 5.5 G/DL (ref 6.4–8.2)
RBC # BLD AUTO: 4.22 X10*6/UL (ref 4–5.2)
SODIUM SERPL-SCNC: 136 MMOL/L (ref 136–145)
WBC # BLD AUTO: 5.7 X10*3/UL (ref 4.4–11.3)

## 2024-01-19 PROCEDURE — 36415 COLL VENOUS BLD VENIPUNCTURE: CPT | Performed by: PHYSICIAN ASSISTANT

## 2024-01-19 PROCEDURE — 83036 HEMOGLOBIN GLYCOSYLATED A1C: CPT | Mod: AHULAB | Performed by: NURSE PRACTITIONER

## 2024-01-19 PROCEDURE — 2500000002 HC RX 250 W HCPCS SELF ADMINISTERED DRUGS (ALT 637 FOR MEDICARE OP, ALT 636 FOR OP/ED): Performed by: PHYSICIAN ASSISTANT

## 2024-01-19 PROCEDURE — 82947 ASSAY GLUCOSE BLOOD QUANT: CPT | Mod: 91

## 2024-01-19 PROCEDURE — 2500000004 HC RX 250 GENERAL PHARMACY W/ HCPCS (ALT 636 FOR OP/ED): Performed by: NURSE PRACTITIONER

## 2024-01-19 PROCEDURE — 96367 TX/PROPH/DG ADDL SEQ IV INF: CPT | Performed by: INTERNAL MEDICINE

## 2024-01-19 PROCEDURE — 83735 ASSAY OF MAGNESIUM: CPT | Performed by: PHYSICIAN ASSISTANT

## 2024-01-19 PROCEDURE — 2500000004 HC RX 250 GENERAL PHARMACY W/ HCPCS (ALT 636 FOR OP/ED): Mod: MUE

## 2024-01-19 PROCEDURE — 2500000005 HC RX 250 GENERAL PHARMACY W/O HCPCS: Performed by: STUDENT IN AN ORGANIZED HEALTH CARE EDUCATION/TRAINING PROGRAM

## 2024-01-19 PROCEDURE — 28495 TREAT BIG TOE FRACTURE: CPT

## 2024-01-19 PROCEDURE — 2500000001 HC RX 250 WO HCPCS SELF ADMINISTERED DRUGS (ALT 637 FOR MEDICARE OP): Performed by: PHARMACIST

## 2024-01-19 PROCEDURE — 73630 X-RAY EXAM OF FOOT: CPT | Mod: LEFT SIDE | Performed by: RADIOLOGY

## 2024-01-19 PROCEDURE — 99232 SBSQ HOSP IP/OBS MODERATE 35: CPT | Performed by: NURSE PRACTITIONER

## 2024-01-19 PROCEDURE — 96366 THER/PROPH/DIAG IV INF ADDON: CPT | Performed by: INTERNAL MEDICINE

## 2024-01-19 PROCEDURE — 85025 COMPLETE CBC W/AUTO DIFF WBC: CPT | Performed by: PHYSICIAN ASSISTANT

## 2024-01-19 PROCEDURE — 2500000001 HC RX 250 WO HCPCS SELF ADMINISTERED DRUGS (ALT 637 FOR MEDICARE OP): Performed by: PHYSICIAN ASSISTANT

## 2024-01-19 PROCEDURE — 2500000004 HC RX 250 GENERAL PHARMACY W/ HCPCS (ALT 636 FOR OP/ED): Mod: MUE | Performed by: PHYSICIAN ASSISTANT

## 2024-01-19 PROCEDURE — 80053 COMPREHEN METABOLIC PANEL: CPT | Performed by: PHYSICIAN ASSISTANT

## 2024-01-19 PROCEDURE — G0378 HOSPITAL OBSERVATION PER HR: HCPCS

## 2024-01-19 PROCEDURE — 73630 X-RAY EXAM OF FOOT: CPT | Mod: LT

## 2024-01-19 PROCEDURE — 28495 TREAT BIG TOE FRACTURE: CPT | Mod: TA

## 2024-01-19 RX ORDER — ENOXAPARIN SODIUM 100 MG/ML
40 INJECTION SUBCUTANEOUS EVERY 24 HOURS
Status: DISCONTINUED | OUTPATIENT
Start: 2024-01-19 | End: 2024-01-20 | Stop reason: HOSPADM

## 2024-01-19 RX ORDER — DOCUSATE SODIUM 100 MG/1
100 CAPSULE, LIQUID FILLED ORAL 2 TIMES DAILY PRN
Status: DISCONTINUED | OUTPATIENT
Start: 2024-01-19 | End: 2024-01-20 | Stop reason: HOSPADM

## 2024-01-19 RX ORDER — CLINDAMYCIN PHOSPHATE 600 MG/50ML
600 INJECTION, SOLUTION INTRAVENOUS EVERY 8 HOURS
Status: DISCONTINUED | OUTPATIENT
Start: 2024-01-19 | End: 2024-01-20 | Stop reason: HOSPADM

## 2024-01-19 RX ORDER — ROSUVASTATIN CALCIUM 40 MG/1
40 TABLET, COATED ORAL DAILY
Status: DISCONTINUED | OUTPATIENT
Start: 2024-01-19 | End: 2024-01-20 | Stop reason: HOSPADM

## 2024-01-19 RX ORDER — MAGNESIUM SULFATE HEPTAHYDRATE 40 MG/ML
2 INJECTION, SOLUTION INTRAVENOUS ONCE
Status: COMPLETED | OUTPATIENT
Start: 2024-01-19 | End: 2024-01-19

## 2024-01-19 RX ORDER — INSULIN LISPRO 100 [IU]/ML
0-10 INJECTION, SOLUTION INTRAVENOUS; SUBCUTANEOUS
Status: DISCONTINUED | OUTPATIENT
Start: 2024-01-19 | End: 2024-01-20 | Stop reason: HOSPADM

## 2024-01-19 RX ORDER — LIDOCAINE HYDROCHLORIDE 10 MG/ML
20 INJECTION, SOLUTION EPIDURAL; INFILTRATION; INTRACAUDAL; PERINEURAL ONCE
Status: COMPLETED | OUTPATIENT
Start: 2024-01-19 | End: 2024-01-19

## 2024-01-19 RX ORDER — DEXTROSE 50 % IN WATER (D50W) INTRAVENOUS SYRINGE
25
Status: DISCONTINUED | OUTPATIENT
Start: 2024-01-19 | End: 2024-01-20 | Stop reason: HOSPADM

## 2024-01-19 RX ORDER — DEXTROSE MONOHYDRATE 100 MG/ML
0.3 INJECTION, SOLUTION INTRAVENOUS ONCE AS NEEDED
Status: DISCONTINUED | OUTPATIENT
Start: 2024-01-19 | End: 2024-01-20 | Stop reason: HOSPADM

## 2024-01-19 RX ORDER — TALC
3 POWDER (GRAM) TOPICAL NIGHTLY PRN
Status: DISCONTINUED | OUTPATIENT
Start: 2024-01-19 | End: 2024-01-20 | Stop reason: HOSPADM

## 2024-01-19 RX ADMIN — EZETIMIBE 10 MG: 10 TABLET ORAL at 22:44

## 2024-01-19 RX ADMIN — OXYCODONE HYDROCHLORIDE AND ACETAMINOPHEN 1 TABLET: 5; 325 TABLET ORAL at 17:51

## 2024-01-19 RX ADMIN — TICAGRELOR 60 MG: 60 TABLET ORAL at 22:44

## 2024-01-19 RX ADMIN — POTASSIUM CHLORIDE 20 MEQ: 1500 TABLET, EXTENDED RELEASE ORAL at 10:29

## 2024-01-19 RX ADMIN — MAGNESIUM SULFATE HEPTAHYDRATE 2 G: 40 INJECTION, SOLUTION INTRAVENOUS at 10:28

## 2024-01-19 RX ADMIN — PANTOPRAZOLE SODIUM 40 MG: 40 TABLET, DELAYED RELEASE ORAL at 06:28

## 2024-01-19 RX ADMIN — GABAPENTIN 300 MG: 300 CAPSULE ORAL at 22:45

## 2024-01-19 RX ADMIN — ASPIRIN 81 MG: 81 TABLET, COATED ORAL at 10:29

## 2024-01-19 RX ADMIN — LIDOCAINE HYDROCHLORIDE 200 MG: 10 INJECTION, SOLUTION EPIDURAL; INFILTRATION; INTRACAUDAL; PERINEURAL at 16:32

## 2024-01-19 RX ADMIN — CLINDAMYCIN HYDROCHLORIDE 600 MG: 150 CAPSULE ORAL at 06:48

## 2024-01-19 RX ADMIN — GLIMEPIRIDE 4 MG: 2 TABLET ORAL at 22:47

## 2024-01-19 RX ADMIN — METOPROLOL SUCCINATE 100 MG: 50 TABLET, EXTENDED RELEASE ORAL at 10:29

## 2024-01-19 RX ADMIN — CLINDAMYCIN PHOSPHATE 600 MG: 600 INJECTION, SOLUTION INTRAVENOUS at 22:45

## 2024-01-19 RX ADMIN — GLIMEPIRIDE 4 MG: 2 TABLET ORAL at 10:29

## 2024-01-19 RX ADMIN — SACUBITRIL AND VALSARTAN 1 TABLET: 49; 51 TABLET, FILM COATED ORAL at 10:29

## 2024-01-19 RX ADMIN — FUROSEMIDE 20 MG: 20 TABLET ORAL at 10:29

## 2024-01-19 RX ADMIN — METOPROLOL SUCCINATE 100 MG: 50 TABLET, EXTENDED RELEASE ORAL at 22:45

## 2024-01-19 RX ADMIN — OXYCODONE HYDROCHLORIDE AND ACETAMINOPHEN 1 TABLET: 5; 325 TABLET ORAL at 06:28

## 2024-01-19 RX ADMIN — TICAGRELOR 60 MG: 60 TABLET ORAL at 10:29

## 2024-01-19 RX ADMIN — DAPAGLIFLOZIN 10 MG: 10 TABLET, FILM COATED ORAL at 10:29

## 2024-01-19 RX ADMIN — SPIRONOLACTONE 12.5 MG: 25 TABLET ORAL at 10:29

## 2024-01-19 RX ADMIN — ROSUVASTATIN CALCIUM 40 MG: 40 TABLET, FILM COATED ORAL at 23:13

## 2024-01-19 RX ADMIN — ACETAMINOPHEN 975 MG: 325 TABLET ORAL at 10:41

## 2024-01-19 RX ADMIN — SACUBITRIL AND VALSARTAN 1 TABLET: 49; 51 TABLET, FILM COATED ORAL at 22:44

## 2024-01-19 RX ADMIN — ACETAMINOPHEN 975 MG: 325 TABLET ORAL at 22:45

## 2024-01-19 RX ADMIN — CLINDAMYCIN PHOSPHATE 600 MG: 600 INJECTION, SOLUTION INTRAVENOUS at 15:17

## 2024-01-19 ASSESSMENT — PAIN SCALES - GENERAL
PAINLEVEL_OUTOF10: 7
PAINLEVEL_OUTOF10: 0 - NO PAIN
PAINLEVEL_OUTOF10: 7
PAINLEVEL_OUTOF10: 6
PAINLEVEL_OUTOF10: 6
PAINLEVEL_OUTOF10: 7

## 2024-01-19 ASSESSMENT — COGNITIVE AND FUNCTIONAL STATUS - GENERAL
DRESSING REGULAR LOWER BODY CLOTHING: A LITTLE
MOBILITY SCORE: 22
DAILY ACTIVITIY SCORE: 23
WALKING IN HOSPITAL ROOM: A LITTLE
CLIMB 3 TO 5 STEPS WITH RAILING: A LITTLE
WALKING IN HOSPITAL ROOM: A LITTLE
DRESSING REGULAR LOWER BODY CLOTHING: A LITTLE
MOBILITY SCORE: 22
CLIMB 3 TO 5 STEPS WITH RAILING: A LITTLE
DAILY ACTIVITIY SCORE: 23

## 2024-01-19 ASSESSMENT — ACTIVITIES OF DAILY LIVING (ADL): LACK_OF_TRANSPORTATION: NO

## 2024-01-19 ASSESSMENT — PAIN - FUNCTIONAL ASSESSMENT
PAIN_FUNCTIONAL_ASSESSMENT: 0-10

## 2024-01-19 ASSESSMENT — PAIN DESCRIPTION - LOCATION
LOCATION: FOOT
LOCATION: FOOT

## 2024-01-19 NOTE — CARE PLAN
The patient's goals for the shift include pain free, get rest    The clinical goals for the shift include Patient will remqain HDS, safe and free from falls this shift.    Over the shift, the patient did make progress toward the following goals. No barriers to progression noted this shift.

## 2024-01-19 NOTE — H&P
History Of Present Illness  Pj Clarke is a 63 y.o. female presenting with left foot pain.  Patient states that she fell on the ice 2 days ago. States she did curl her left toes and initially had some pain involving her left knee, left femur, and left foot.  Initially she did not think much of the fall until she woke up the next day and symptoms started to progress and she was worried about a potential infection..  She states that she followed up with her primary care doctor who encouraged her to follow-up at the emergency room.    She reports having erythema and tenderness throughout her left foot, digits 1 and 2.  The remaining portion of her foot both dorsal and ventral aspect and the remaining 3 toes are completely asymptomatic.  Her knee is asymptomatic and her right lower extremity is asymptomatic.    When she followed up with her primary earlier, they did obtain x-rays of her foot and tried to debride her first toenail.  The x-rays came back and showed possible fracture and with the surgeon of potential infection they sent her to the emergency room for a stronger dosage of antibiotics.    She has significant past medical history of diabetes insulin-dependent, hypertension, and CAD .    She is allergic to penicillin, she states that she has been on clindamycin in the past and it has helped.     Past Medical History  She has a past medical history of Acute ischemic heart disease, unspecified (CMS/HCC), Breast cyst (had 1 as a teenager.), Dermatochalasis of unspecified eye, unspecified eyelid (11/13/2018), Essential (primary) hypertension (11/16/2022), Personal history of other mental and behavioral disorders, and Pure hyperglyceridemia (11/28/2016).    Surgical History  She has a past surgical history that includes Cholecystectomy (06/19/2013); Other surgical history (06/29/2021); Gallbladder surgery (05/11/2016); Tonsillectomy (05/11/2016); Hysterectomy (02/11/2015); Other surgical history  (01/03/2017); and Breast cyst aspiration (when i was a young teenager.).     Social History  She reports that she has never smoked. She has never used smokeless tobacco. She reports current alcohol use. She reports that she does not currently use drugs.    Family History  Family History   Problem Relation Name Age of Onset    Other (Malignant neoplasm) Mother      Other (Malignant neoplasm) Father      Colon cancer Mother's Sister      Other (Family history of diabetes mellitus) Other          Allergies  Penicillins, Atorvastatin, and Metformin hcl    Review of Systems:  I reviewed the complete 30-point review of systems that was documented on the scanned patient intake form.  All other systems are non-contributory except as defined in history of present illness.    Physical Exam   Appearance: Alert, oriented , cooperative,  in acute distress. Well nourished & well hydrated.     Skin: Intact,  dry skin, no lesions, rash, petechiae or purpura.      Eyes: PERRLA, EOMs intact,  Conjunctiva pink with no redness or exudates. Cornea & anterior chamber are clear, Eyelids without lesions. No scleral icterus.      ENT: Hearing grossly intact. External auditory canals patent, tympanic membranes intact with visible landmarks. Nares patent, mucus membranes moist. Dentition without lesions. Pharynx clear, uvula midline.      Neck: Supple, without meningismus. Thyroid not palpable. Trachea at midline. No lymphadenopathy.     Pulmonary: Clear bilaterally with good chest wall excursion. No rales, rhonchi or wheezing. No accessory muscle use or stridor.     Cardiac: Normal S1, S2 without murmur, rub, gallop or extrasystole. No JVD, Carotids without bruits.     Abdomen: Soft, nontender, active bowel sounds.  No palpable organomegaly.  No rebound or guarding.  No CVA tenderness.     Genitourinary: Exam deferred.     Musculoskeletal: Full range of motion. no edema, or deformity. Pulses full and equal. No cyanosis or clubbing.  Moderate  tenderness to palpation involving the left proximal femur with no obvious deformity noted, moderate erythema and tenderness throughout the left first and second toe as well as the left foot, no obvious laceration noted, mild hematoma to the left distal first toe.  No open wounds.     Neurological:  Cranial nerves II through XII are grossly intact, finger-nose touch is normal, normal sensation, no weakness, no focal findings identified.     Psychiatric: Appropriate mood and affect.   Last Recorded Vitals  BP 86/72 (BP Location: Right arm, Patient Position: Lying)   Pulse 84   Temp 36.3 °C (97.4 °F)   Resp 20   Wt 104 kg (230 lb)   SpO2 98%     Relevant Results  Scheduled medications  [START ON 1/19/2024] aspirin, 81 mg, oral, Daily  [START ON 1/19/2024] dapagliflozin propanediol, 10 mg, oral, Daily  ezetimibe, 10 mg, oral, Nightly  [START ON 1/19/2024] furosemide, 20 mg, oral, Daily  gabapentin, 300 mg, oral, Nightly  glimepiride, 4 mg, oral, BID  metFORMIN XR, 1,000 mg, oral, BID  metoprolol succinate XL, 100 mg, oral, q12h  mometasone, 1 puff, inhalation, BID  [START ON 1/19/2024] potassium chloride CR, 20 mEq, oral, Daily  [START ON 1/19/2024] rosuvastatin, 40 mg, oral, Daily  [START ON 1/19/2024] rosuvastatin, 40 mg, oral, Daily  sacubitriL-valsartan, 1 tablet, oral, BID  [START ON 1/19/2024] spironolactone, 12.5 mg, oral, Daily  ticagrelor, 60 mg, oral, BID      Continuous medications     PRN medications    Results for orders placed or performed during the hospital encounter of 01/18/24 (from the past 24 hour(s))   CBC and Auto Differential   Result Value Ref Range    WBC 7.9 4.4 - 11.3 x10*3/uL    nRBC 0.0 0.0 - 0.0 /100 WBCs    RBC 4.73 4.00 - 5.20 x10*6/uL    Hemoglobin 14.2 12.0 - 16.0 g/dL    Hematocrit 43.3 36.0 - 46.0 %    MCV 92 80 - 100 fL    MCH 30.0 26.0 - 34.0 pg    MCHC 32.8 32.0 - 36.0 g/dL    RDW 15.0 (H) 11.5 - 14.5 %    Platelets 284 150 - 450 x10*3/uL    Neutrophils % 71.0 40.0 - 80.0 %     Immature Granulocytes %, Automated 0.4 0.0 - 0.9 %    Lymphocytes % 18.9 13.0 - 44.0 %    Monocytes % 8.4 2.0 - 10.0 %    Eosinophils % 1.0 0.0 - 6.0 %    Basophils % 0.3 0.0 - 2.0 %    Neutrophils Absolute 5.63 1.20 - 7.70 x10*3/uL    Immature Granulocytes Absolute, Automated 0.03 0.00 - 0.70 x10*3/uL    Lymphocytes Absolute 1.50 1.20 - 4.80 x10*3/uL    Monocytes Absolute 0.67 0.10 - 1.00 x10*3/uL    Eosinophils Absolute 0.08 0.00 - 0.70 x10*3/uL    Basophils Absolute 0.02 0.00 - 0.10 x10*3/uL   Comprehensive metabolic panel   Result Value Ref Range    Glucose 167 (H) 74 - 99 mg/dL    Sodium 136 136 - 145 mmol/L    Potassium 4.3 3.5 - 5.3 mmol/L    Chloride 105 98 - 107 mmol/L    Bicarbonate 18 (L) 21 - 32 mmol/L    Anion Gap 17 10 - 20 mmol/L    Urea Nitrogen 33 (H) 6 - 23 mg/dL    Creatinine 1.20 (H) 0.50 - 1.05 mg/dL    eGFR 51 (L) >60 mL/min/1.73m*2    Calcium 9.4 8.6 - 10.3 mg/dL    Albumin 4.0 3.4 - 5.0 g/dL    Alkaline Phosphatase 97 33 - 136 U/L    Total Protein 6.4 6.4 - 8.2 g/dL    AST 43 (H) 9 - 39 U/L    Bilirubin, Total 0.8 0.0 - 1.2 mg/dL    ALT 26 7 - 45 U/L   C-Reactive Protein   Result Value Ref Range    C-Reactive Protein 1.00 (H) <1.00 mg/dL   Sedimentation Rate   Result Value Ref Range    Sedimentation Rate 12 0 - 30 mm/h   Lactate   Result Value Ref Range    Lactate 1.7 0.4 - 2.0 mmol/L       XR foot left 3+ views    Result Date: 1/18/2024  STUDY: Foot Radiographs; 1/18/24 at 7:36pm INDICATION: Fall. COMPARISON: 11/22/22 XR Foot, 4/19/22 XR Foot. ACCESSION NUMBER(S): [id number] ORDERING CLINICIAN: HAYES GARNER TECHNIQUE:  Three view(s) of the left foot. FINDINGS:  There is an acute, minimally displaced corner fracture at the lateral base of the first distal phalanx at the DIP joint. Additional small fracture fragment is suggested near the medial joint space. There is also likely subluxation at the DIP joint. Swelling is seen in the adjacent soft tissues. There is no other potential acute  fracture or dislocation. There is minimal mid foot arthritis, and there is spurring at the calcaneus.    Acute fracture(s) first distal phalanx near the DIP joint, with likely DIP joint subluxation. Remainder as above. Signed by Fab Lopez MD    XR hip left with pelvis when performed 2 or 3 views    Result Date: 1/18/2024  STUDY: Pelvis and Left Hip Radiographs; 1/18/2024, 7:23PM INDICATION: Pain after fall. COMPARISON: None Available. ACCESSION NUMBER(S): [id number] ORDERING CLINICIAN: HAYES GARNER TECHNIQUE:  AP view of the pelvis and two view(s) of the left hip. FINDINGS:  PELVIS: The pelvic ring is intact.  There is no acute fracture. LEFT HIP: There is no displaced fracture.  The alignment is anatomic.  No soft tissue abnormality is seen.    Moderate degenerative change of both hip joints. No definite acute osseous abnormality. If hip pain persists, suggest CT or MRI for further evaluation. Signed by Myron Grier MD    XR femur left 2+ views    Result Date: 1/18/2024  STUDY: Femur Radiographs; 1/18/2024, 7:23PM INDICATION: Pain after fall. COMPARISON: None Available. ACCESSION NUMBER(S): [id number] ORDERING CLINICIAN: HAYES GARNER TECHNIQUE:  Two view(s) (four images) of the left femur. FINDINGS:  There is no displaced fracture.  The alignment is anatomic.  No soft tissue abnormality is seen.    Degenerative changes of the left hip and knee joint. No definite acute osseous abnormality. Signed by Myron Grier MD    XR knee left 4+ views    Result Date: 1/18/2024  Interpreted By:  Sekou Chang, STUDY: XR KNEE LEFT 4+ VIEWS;  1/18/2024 7:22 pm   INDICATION: Signs/Symptoms:fall.   COMPARISON: None.   ACCESSION NUMBER(S): [id number]   ORDERING CLINICIAN: HAYES GARNER   FINDINGS: Four views of the left knee.   No acute fracture or dislocation. Mild-to-moderate degenerative changes with patellofemoral joint space narrowing and tricompartmental marginal osteophytosis. Small joint effusion.        1. No acute osseous abnormality identified. 2. Small joint effusion.       Signed by: Sekou Bernardo 1/18/2024 7:34 PM Dictation workstation:   [id number]             Assessment/Plan   Principal Problem:    Cellulitis of foot      Patient is going to be admitted to observation for concern of cellulitis.  She also has a diagnosis of left, mid distal phalanx fracture affecting digits 1 and 2 on the left foot.    -To cont to monitor the cellulitis, we are going to continue her IV antibiotics of clindamycin.  We will also keep an eye on it from a clinical standpoint.  Patient was also informed to reach out to the staff if she notices any changes.  -In regards to her orthopedic diagnosis, we are going to manage this conservatively as well.  She will be sized and fit for a postop shoe.  She will have a follow-up appointment with orthopedics in the next 7 to 10 days.  She states that she already has a foot and ankle doctor that she would like to follow-up with.  -We will provide pain control by means of Tylenol for mild to moderate pain and Percocet for breakthrough pain.    This note was dictated using speech recognition software and was not corrected for spelling or grammatical errors         Flori Olson PA-C

## 2024-01-19 NOTE — ED PROCEDURE NOTE
Procedure  Orthopedic Injury Treatment - Cast/Splint/Fracture    Date/Time: 1/19/2024 5:26 PM    Performed by: Harpreet Almanza DPM  Authorized by: Myron Brownlee DPM    Consent:     Consent obtained:  Written    Consent given by:  Patient    Risks, benefits, and alternatives were discussed: yes      Risks discussed:  Pain    Alternatives discussed:  No treatment, delayed treatment and observation  Universal protocol:     Procedure explained and questions answered to patient or proxy's satisfaction: yes      Imaging studies available: yes      Site/side marked: yes      Immediately prior to procedure, a time out was called: yes      Patient identity confirmed:  Verbally with patient, arm band and hospital-assigned identification number  Injury:     Injury location:  Toe    Toe injury location:  L great toe  Pre-procedure details:     Distal neurologic exam:  Normal    Distal perfusion: distal pulses diminished      Range of motion: reduced    Sedation:     Sedation type:  None  Anesthesia:     Anesthesia method:  Local infiltration    Local anesthetic:  Lidocaine 1% w/o epi  Procedure details:     Manipulation performed: yes      Reduction successful: yes    Post-procedure details:     Distal neurologic exam:  Unchanged    Distal perfusion: unchanged      Range of motion: improved      Procedure completion:  Tolerated well, no immediate complications  Comments:      Closed reduction left hallux IPJ.  Tolerated well.

## 2024-01-19 NOTE — PROGRESS NOTES
Pharmacy Medication History Review    Pj Clarke is a 63 y.o. female admitted for Cellulitis of foot. Pharmacy reviewed the patient's [id number] medications and allergies for accuracy.    Below are additional concerns with the patient's PTA list.  Collected from Patient    The list below reflectives the updated PTA list. Please review each medication in order reconciliation for additional clarification and justification.  Medications Prior to Admission   Medication Sig Dispense Refill Last Dose    aspirin 81 mg EC tablet Take 1 tablet (81 mg) by mouth once daily.   1/18/2024    Basaglar KwikPen U-100 Insulin 100 unit/mL (3 mL) pen INJECT 35 UNITS UNDER THE SKIN TWO TIMES A DAY. 21 mL 5 1/18/2024    Brilinta 60 mg tablet Take 1 tablet (60 mg) by mouth 2 times a day.   1/18/2024    Crestor 40 mg tablet TAKE ONE TABLET BY MOUTH EVERY DAY 90 tablet 3 1/18/2024 at pt brought own med.  will send to rx for verification    Crestor 40 mg tablet Take 1 tablet (40 mg) by mouth once daily. 90 tablet 0 1/18/2024 at duplicate order    Entresto 49-51 mg tablet Take 1 tablet by mouth 2 times a day.   1/18/2024    ezetimibe (Zetia) 10 mg tablet Take 1 tablet (10 mg) by mouth once daily at bedtime.   1/18/2024    Farxiga 10 mg TAKE ONE TABLET BY MOUTH EVERY DAY 90 tablet 0 1/18/2024    furosemide (Lasix) 20 mg tablet Take 1 tablet (20 mg) by mouth once daily.   1/18/2024    gabapentin (Neurontin) 300 mg capsule Take 1 capsule (300 mg) by mouth once daily at bedtime. Take at bedtime.   1/18/2024    glimepiride (Amaryl) 4 mg tablet Take 1 tablet (4 mg) by mouth 2 times a day.   1/18/2024    metFORMIN  mg 24 hr tablet Take 2 tablets (1,000 mg) by mouth 2 times a day.   1/18/2024    metoprolol succinate XL (Toprol-XL) 100 mg 24 hr tablet Take 1 tablet (100 mg) by mouth every 12 hours.   1/18/2024    Mounjaro 2.5 mg/0.5 mL pen injector INJECT 2.5 MG UNDER THE SKIN ONE TIME PER WEEK (Patient taking differently:  "Inject 5 mg under the skin 1 (one) time per week. Mondays) 2 mL 1 Past Week at takes on Mondays    omeprazole (PriLOSEC) 40 mg DR capsule Take 1 capsule (40 mg) by mouth once daily. Do not crush or chew. 30 capsule 6 1/18/2024    pen needle, diabetic (BD Teri 2nd Gen Pen Needle) 32 gauge x 5/32\" needle USE TO INJECT INSULIN TWICE DAILY 200 each 0 Unknown    pen needle, diabetic 31 gauge x 1/4\" needle Use  as directed   Unknown    potassium chloride CR 20 mEq ER tablet Take 1 tablet (20 mEq) by mouth once daily. 90 tablet 2 1/18/2024    spironolactone (Aldactone) 25 mg tablet Take 0.5 tablets (12.5 mg) by mouth once daily.   1/18/2024              Flori Bradford, PharmD    "

## 2024-01-19 NOTE — PROGRESS NOTES
01/19/24 1317   Discharge Planning   Living Arrangements Spouse/significant other   Support Systems Spouse/significant other   Assistance Needed No home going needs identified   Type of Residence Private residence   Number of Stairs Within Residence 12   Home or Post Acute Services None   Patient expects to be discharged to: Home   Does the patient need discharge transport arranged? Yes   Financial Resource Strain   How hard is it for you to pay for the very basics like food, housing, medical care, and heating? Not hard   Housing Stability   In the last 12 months, was there a time when you were not able to pay the mortgage or rent on time? N   In the last 12 months, how many places have you lived? 1   In the last 12 months, was there a time when you did not have a steady place to sleep or slept in a shelter (including now)? N   Transportation Needs   In the past 12 months, has lack of transportation kept you from medical appointments or from getting medications? no   In the past 12 months, has lack of transportation kept you from meetings, work, or from getting things needed for daily living? No     Met with patient at bedside to  discuss her preferences for care upon discharge. Discussed how patient manages health at home. Lives with her  in a house in [place] .  Independent in all ADLs.  No home O2, dialysis, or assistive devices.  No additional resources or needs identified. Reviewed today's plan of care.  Patient verbalized understanding as evidenced by teach back method. Patient plans to return home at discharge & follow up with her PCP.   will provide transportation home upon discharge.  Anticipate discharge tomorrow.  Pending podiatry consult.  KUSHAL Beckman RN [facility]

## 2024-01-19 NOTE — PROGRESS NOTES
Pj Clarke is a 63 y.o. female who presented to ed on 1/18/2024 presenting with Cellulitis of foot.    Subjective    In bed comfortably.  No apparent distress.  Does appear emotionally unsettled.  Reports she is afraid that she might have to have an amputation or lose her foot given she has diabetes.  Reassurance provided education provided.  Patient states she appreciates education.     Review of systems   Negative except as noted above        Objective   Physical Exam   Physical Exam  Constitutional:       General: She is not in acute distress.     Appearance: She is obese.   HENT:      Head: Normocephalic and atraumatic.      Nose: Nose normal.      Mouth/Throat:      Mouth: Mucous membranes are moist.      Pharynx: Oropharynx is clear. No oropharyngeal exudate or posterior oropharyngeal erythema.   Eyes:      Extraocular Movements: Extraocular movements intact.      Conjunctiva/sclera: Conjunctivae normal.      Pupils: Pupils are equal, round, and reactive to light.   Cardiovascular:      Rate and Rhythm: Normal rate and regular rhythm.      Pulses: Normal pulses.      Heart sounds: Normal heart sounds.   Pulmonary:      Effort: Pulmonary effort is normal.      Breath sounds: Normal breath sounds.   Abdominal:      General: Bowel sounds are normal.      Palpations: Abdomen is soft.   Musculoskeletal:         General: Swelling, tenderness and signs of injury present. Normal range of motion.      Cervical back: Normal range of motion and neck supple.      Comments: Left great and 2nd toe bruised, tender. Edema and erythema to anterior foot. Outlined in marker. Maybe slightly warmer than rest of leg but not much   Skin:     General: Skin is warm and dry.      Capillary Refill: Capillary refill takes less than 2 seconds.      Findings: Erythema present.   Neurological:      General: No focal deficit present.      Mental Status: She is alert and oriented to person, place, and time.   Psychiatric:          Mood and Affect: Mood normal.         Last Recorded Vitals  /68 (BP Location: Left arm, Patient Position: Lying)   Pulse 79   Temp 36.7 °C (98.1 °F) (Temporal)   Resp 18   Wt 104 kg (230 lb)   SpO2 96%   Intake/Output last 3 Shifts:    Intake/Output Summary (Last 24 hours) at 1/19/2024 1221  Last data filed at 1/18/2024 2346  Gross per 24 hour   Intake 1000 ml   Output --   Net 1000 ml     Admission Weight  Weight: 104 kg (230 lb) (01/18/24 1758)  Daily Weight  01/18/24 : 104 kg (230 lb)      Medications   Scheduled medications  aspirin, 81 mg, oral, Daily  budesonide, 0.5 mg, nebulization, BID  clindamycin, 600 mg, intravenous, q8h  [Held by provider] clindamycin, 600 mg, oral, q8h  dapagliflozin propanediol, 10 mg, oral, Daily  enoxaparin, 40 mg, subcutaneous, q24h  ezetimibe, 10 mg, oral, Nightly  furosemide, 20 mg, oral, Daily  gabapentin, 300 mg, oral, Nightly  glimepiride, 4 mg, oral, BID  insulin lispro, 0-10 Units, subcutaneous, TID with meals  magnesium sulfate, 2 g, intravenous, Once  [Held by provider] metFORMIN XR, 1,000 mg, oral, BID  metoprolol succinate XL, 100 mg, oral, q12h  pantoprazole, 40 mg, oral, Daily before breakfast  potassium chloride CR, 20 mEq, oral, Daily  rosuvastatin, 40 mg, oral, Daily  sacubitriL-valsartan, 1 tablet, oral, BID  spironolactone, 12.5 mg, oral, Daily  ticagrelor, 60 mg, oral, BID      Continuous medications     PRN medications  PRN medications: acetaminophen, dextrose 10 % in water (D10W), dextrose, docusate sodium, glucagon, melatonin, oxyCODONE-acetaminophen     Relevant Results  Image Results  XR foot left 3+ views  Narrative: STUDY:  Foot Radiographs; 1/18/24 at 7:36pm  INDICATION:  Fall.  COMPARISON:  11/22/22 XR Foot, 4/19/22 XR Foot.  ACCESSION NUMBER(S):  [id number]  ORDERING CLINICIAN:  HAYES GARNER  TECHNIQUE:  Three view(s) of the left foot.  FINDINGS:    There is an acute, minimally displaced corner fracture at the lateral  base of the first  distal phalanx at the DIP joint. Additional small  fracture fragment is suggested near the medial joint space. There is  also likely subluxation at the DIP joint. Swelling is seen in the  adjacent soft tissues.  There is no other potential acute fracture or dislocation. There is  minimal mid foot arthritis, and there is spurring at the calcaneus.  Impression: Acute fracture(s) first distal phalanx near the DIP joint, with likely  DIP joint subluxation. Remainder as above.  Signed by Fab Lopez MD  XR knee left 4+ views  Narrative: Interpreted By:  Sekou Chang,   STUDY:  XR KNEE LEFT 4+ VIEWS;  1/18/2024 7:22 pm      INDICATION:  Signs/Symptoms:fall.      COMPARISON:  None.      ACCESSION NUMBER(S):  [id number]      ORDERING CLINICIAN:  HAYES GARNER      FINDINGS:  Four views of the left knee.      No acute fracture or dislocation.  Mild-to-moderate degenerative changes with patellofemoral joint space  narrowing and tricompartmental marginal osteophytosis. Small joint  effusion.      Impression: 1. No acute osseous abnormality identified.  2. Small joint effusion.              Signed by: Sekou Chang 1/18/2024 7:34 PM  Dictation workstation:   [id number]  XR hip left with pelvis when performed 2 or 3 views  Narrative: STUDY:  Pelvis and Left Hip Radiographs; 1/18/2024, 7:23PM  INDICATION:  Pain after fall.  COMPARISON:  None Available.  ACCESSION NUMBER(S):  [id number]  ORDERING CLINICIAN:  HAYES GARNER  TECHNIQUE:  AP view of the pelvis and two view(s) of the left hip.  FINDINGS:    PELVIS:  The pelvic ring is intact.  There is no acute fracture.  LEFT HIP:  There is no displaced fracture.  The alignment is anatomic.  No soft  tissue abnormality is seen.  Impression: Moderate degenerative change of both hip joints.  No definite acute osseous abnormality.  If hip pain persists, suggest CT or MRI for further evaluation.  Signed by Myron Grier MD  XR femur left 2+ views  Narrative: STUDY:  Femur  Radiographs; 1/18/2024, 7:23PM  INDICATION:  Pain after fall.  COMPARISON:  None Available.  ACCESSION NUMBER(S):  [id number]  ORDERING CLINICIAN:  HAYES GARNER  TECHNIQUE:  Two view(s) (four images) of the left femur.  FINDINGS:    There is no displaced fracture.  The alignment is anatomic.  No soft  tissue abnormality is seen.  Impression: Degenerative changes of the left hip and knee joint.  No definite acute osseous abnormality.  Signed by Myron Grier MD        Assessment/Plan   Pj Clarke is a 63 y.o. female presenting with left foot pain.  Patient states that she fell on the ice 2 days ago. States she did curl her left toes and initially had some pain involving her left knee, left femur, and left foot.  Initially she did not think much of the fall until she woke up the next day and symptoms started to progress and she was worried about a potential infection..  She states that she followed up with her primary care doctor who encouraged her to follow-up at the emergency room.     She reports having erythema and tenderness throughout her left foot, digits 1 and 2.  The remaining portion of her foot both dorsal and ventral aspect and the remaining 3 toes are completely asymptomatic.  Her knee is asymptomatic and her right lower extremity is asymptomatic.     When she followed up with her primary earlier, they did obtain x-rays of her foot and tried to debride her first toenail.  The x-rays came back and showed possible fracture and with the surgeon of potential infection they sent her to the emergency room for a stronger dosage of antibiotics.     She has significant past medical history of diabetes insulin-dependent, hypertension, and CAD and dual chamber pacemaker     Principal Problem:    Cellulitis of foot  - creat 1.2 --> 1.0  - sed rate 12, crp 1.0  - lactate 1.7   - xr knee -small joint effusion   - xr hip - mild deg dis   - xr femur - nothing acute   - xr foot -first distal phalanx  fracture near DIP joint with probable DIP joint subluxation  - blood culture pending   - iv clindamycin 600mg q8hr -- likely dc on doxy x10d   - prn tylenol, percocet   - fu outpt 7-10d with podiatrist/orthopedist which she has in mind   - fit for postop shoe   - consult podiatry       DM   - hold metformin   - sliding scale   - A1c     CAD/ htn   - continue brilinta, entresto,  metop, aldactone   - mg>2 and k>4 - replace as needed     Dvt prophylaxis   - lovenox and continue brillinta   - scd/ambulate     Gi prophylaxis   - pantopraxole   - miralax     DC plan  - DC home when medically stable    Labs/Testing reviewed:   Interdisciplinary team rounding completed with hospitalist, nurse, [facility]  NP discussed plan & lab/testing results with Dr. De Los Santos  --- pending podiatry consult   45 min spent on professional & overall care of this patient    Elisa Evangelista, GARIMA-CNP

## 2024-01-19 NOTE — CARE PLAN
The patient's goals for the shift include pain free, get rest    The clinical goals for the shift include free from injury, decrease in pain    Problem: Pain  Goal: Takes deep breaths with improved pain control throughout the shift  Outcome: Progressing  Goal: Turns in bed with improved pain control throughout the shift  Outcome: Progressing  Goal: Walks with improved pain control throughout the shift  Outcome: Progressing  Goal: Performs ADL's with improved pain control throughout shift  Outcome: Progressing  Goal: Participates in PT with improved pain control throughout the shift  Outcome: Progressing  Goal: Free from opioid side effects throughout the shift  Outcome: Progressing  Goal: Free from acute confusion related to pain meds throughout the shift  Outcome: Progressing     Problem: Pain - Adult  Goal: Verbalizes/displays adequate comfort level or baseline comfort level  Outcome: Progressing     Problem: Safety - Adult  Goal: Free from fall injury  Outcome: Progressing     Problem: Discharge Planning  Goal: Discharge to home or other facility with appropriate resources  Outcome: Progressing     Problem: Chronic Conditions and Co-morbidities  Goal: Patient's chronic conditions and co-morbidity symptoms are monitored and maintained or improved  Outcome: Progressing     Problem: Fall/Injury  Goal: Not fall by end of shift  Outcome: Progressing  Goal: Be free from injury by end of the shift  Outcome: Progressing  Goal: Verbalize understanding of personal risk factors for fall in the hospital  Outcome: Progressing  Goal: Verbalize understanding of risk factor reduction measures to prevent injury from fall in the home  Outcome: Progressing  Goal: Use assistive devices by end of the shift  Outcome: Progressing  Goal: Pace activities to prevent fatigue by end of the shift  Outcome: Progressing     Problem: Diabetes  Goal: Achieve decreasing blood glucose levels by end of shift  Outcome: Progressing  Goal: Increase  stability of blood glucose readings by end of shift  Outcome: Progressing  Goal: Decrease in ketones present in urine by end of shift  Outcome: Progressing  Goal: Maintain electrolyte levels within acceptable range throughout shift  Outcome: Progressing  Goal: Maintain glucose levels >70mg/dl to <250mg/dl throughout shift  Outcome: Progressing  Goal: No changes in neurological exam by end of shift  Outcome: Progressing  Goal: Learn about and adhere to nutrition recommendations by end of shift  Outcome: Progressing  Goal: Vital signs within normal range for age by end of shift  Outcome: Progressing  Goal: Increase self care and/or family involovement by end of shift  Outcome: Progressing  Goal: Receive DSME education by end of shift  Outcome: Progressing

## 2024-01-20 VITALS
SYSTOLIC BLOOD PRESSURE: 140 MMHG | OXYGEN SATURATION: 97 % | HEIGHT: 70 IN | TEMPERATURE: 98.2 F | WEIGHT: 230 LBS | RESPIRATION RATE: 18 BRPM | DIASTOLIC BLOOD PRESSURE: 78 MMHG | HEART RATE: 98 BPM | BODY MASS INDEX: 32.93 KG/M2

## 2024-01-20 LAB
ANION GAP SERPL CALC-SCNC: 14 MMOL/L (ref 10–20)
ANION GAP SERPL CALC-SCNC: 15 MMOL/L (ref 10–20)
BUN SERPL-MCNC: 24 MG/DL (ref 6–23)
BUN SERPL-MCNC: 24 MG/DL (ref 6–23)
CALCIUM SERPL-MCNC: 8.9 MG/DL (ref 8.6–10.3)
CALCIUM SERPL-MCNC: 8.9 MG/DL (ref 8.6–10.3)
CHLORIDE SERPL-SCNC: 106 MMOL/L (ref 98–107)
CHLORIDE SERPL-SCNC: 106 MMOL/L (ref 98–107)
CO2 SERPL-SCNC: 18 MMOL/L (ref 21–32)
CO2 SERPL-SCNC: 20 MMOL/L (ref 21–32)
CREAT SERPL-MCNC: 1.31 MG/DL (ref 0.5–1.05)
CREAT SERPL-MCNC: 1.33 MG/DL (ref 0.5–1.05)
EGFRCR SERPLBLD CKD-EPI 2021: 45 ML/MIN/1.73M*2
EGFRCR SERPLBLD CKD-EPI 2021: 46 ML/MIN/1.73M*2
ERYTHROCYTE [DISTWIDTH] IN BLOOD BY AUTOMATED COUNT: 14.9 % (ref 11.5–14.5)
GLUCOSE BLD MANUAL STRIP-MCNC: 110 MG/DL (ref 74–99)
GLUCOSE BLD MANUAL STRIP-MCNC: 179 MG/DL (ref 74–99)
GLUCOSE SERPL-MCNC: 193 MG/DL (ref 74–99)
GLUCOSE SERPL-MCNC: 230 MG/DL (ref 74–99)
HCT VFR BLD AUTO: 41.1 % (ref 36–46)
HGB BLD-MCNC: 13.3 G/DL (ref 12–16)
MCH RBC QN AUTO: 30 PG (ref 26–34)
MCHC RBC AUTO-ENTMCNC: 32.4 G/DL (ref 32–36)
MCV RBC AUTO: 93 FL (ref 80–100)
NRBC BLD-RTO: 0 /100 WBCS (ref 0–0)
PLATELET # BLD AUTO: 257 X10*3/UL (ref 150–450)
POTASSIUM SERPL-SCNC: 4.3 MMOL/L (ref 3.5–5.3)
POTASSIUM SERPL-SCNC: 4.6 MMOL/L (ref 3.5–5.3)
RBC # BLD AUTO: 4.43 X10*6/UL (ref 4–5.2)
SODIUM SERPL-SCNC: 134 MMOL/L (ref 136–145)
SODIUM SERPL-SCNC: 136 MMOL/L (ref 136–145)
WBC # BLD AUTO: 5.4 X10*3/UL (ref 4.4–11.3)

## 2024-01-20 PROCEDURE — 80048 BASIC METABOLIC PNL TOTAL CA: CPT | Performed by: NURSE PRACTITIONER

## 2024-01-20 PROCEDURE — 2500000001 HC RX 250 WO HCPCS SELF ADMINISTERED DRUGS (ALT 637 FOR MEDICARE OP): Performed by: PHYSICIAN ASSISTANT

## 2024-01-20 PROCEDURE — 2500000002 HC RX 250 W HCPCS SELF ADMINISTERED DRUGS (ALT 637 FOR MEDICARE OP, ALT 636 FOR OP/ED): Performed by: NURSE PRACTITIONER

## 2024-01-20 PROCEDURE — G0378 HOSPITAL OBSERVATION PER HR: HCPCS

## 2024-01-20 PROCEDURE — 2500000004 HC RX 250 GENERAL PHARMACY W/ HCPCS (ALT 636 FOR OP/ED): Mod: MUE | Performed by: PHYSICIAN ASSISTANT

## 2024-01-20 PROCEDURE — 82947 ASSAY GLUCOSE BLOOD QUANT: CPT | Mod: 59

## 2024-01-20 PROCEDURE — 99233 SBSQ HOSP IP/OBS HIGH 50: CPT | Performed by: NURSE PRACTITIONER

## 2024-01-20 PROCEDURE — 96376 TX/PRO/DX INJ SAME DRUG ADON: CPT | Performed by: INTERNAL MEDICINE

## 2024-01-20 PROCEDURE — 2500000004 HC RX 250 GENERAL PHARMACY W/ HCPCS (ALT 636 FOR OP/ED): Performed by: NURSE PRACTITIONER

## 2024-01-20 PROCEDURE — 84132 ASSAY OF SERUM POTASSIUM: CPT | Performed by: NURSE PRACTITIONER

## 2024-01-20 PROCEDURE — 36415 COLL VENOUS BLD VENIPUNCTURE: CPT | Performed by: NURSE PRACTITIONER

## 2024-01-20 PROCEDURE — 85027 COMPLETE CBC AUTOMATED: CPT | Performed by: NURSE PRACTITIONER

## 2024-01-20 PROCEDURE — 2500000001 HC RX 250 WO HCPCS SELF ADMINISTERED DRUGS (ALT 637 FOR MEDICARE OP): Performed by: PHARMACIST

## 2024-01-20 RX ORDER — OXYCODONE HYDROCHLORIDE 5 MG/1
5 TABLET ORAL EVERY 6 HOURS PRN
Qty: 12 TABLET | Refills: 0 | Status: SHIPPED | OUTPATIENT
Start: 2024-01-20 | End: 2024-04-06 | Stop reason: ALTCHOICE

## 2024-01-20 RX ORDER — CLINDAMYCIN HYDROCHLORIDE 300 MG/1
600 CAPSULE ORAL EVERY 8 HOURS
Qty: 60 CAPSULE | Refills: 0 | Status: SHIPPED | OUTPATIENT
Start: 2024-01-20 | End: 2024-01-30

## 2024-01-20 RX ORDER — INSULIN GLARGINE 100 [IU]/ML
35 INJECTION, SOLUTION SUBCUTANEOUS 2 TIMES DAILY
Status: DISCONTINUED | OUTPATIENT
Start: 2024-01-20 | End: 2024-01-20 | Stop reason: HOSPADM

## 2024-01-20 RX ADMIN — SODIUM CHLORIDE 250 ML: 9 INJECTION, SOLUTION INTRAVENOUS at 12:35

## 2024-01-20 RX ADMIN — CLINDAMYCIN PHOSPHATE 600 MG: 600 INJECTION, SOLUTION INTRAVENOUS at 15:59

## 2024-01-20 RX ADMIN — CLINDAMYCIN PHOSPHATE 600 MG: 600 INJECTION, SOLUTION INTRAVENOUS at 06:29

## 2024-01-20 RX ADMIN — DAPAGLIFLOZIN 10 MG: 10 TABLET, FILM COATED ORAL at 13:17

## 2024-01-20 RX ADMIN — SPIRONOLACTONE 12.5 MG: 25 TABLET ORAL at 09:38

## 2024-01-20 RX ADMIN — INSULIN LISPRO 2 UNITS: 100 INJECTION, SOLUTION INTRAVENOUS; SUBCUTANEOUS at 13:12

## 2024-01-20 RX ADMIN — GLIMEPIRIDE 4 MG: 2 TABLET ORAL at 09:39

## 2024-01-20 RX ADMIN — METOPROLOL SUCCINATE 100 MG: 50 TABLET, EXTENDED RELEASE ORAL at 09:44

## 2024-01-20 RX ADMIN — INSULIN GLARGINE 35 UNITS: 100 INJECTION, SOLUTION SUBCUTANEOUS at 11:00

## 2024-01-20 RX ADMIN — ASPIRIN 81 MG: 81 TABLET, COATED ORAL at 09:39

## 2024-01-20 RX ADMIN — FUROSEMIDE 20 MG: 20 TABLET ORAL at 09:39

## 2024-01-20 RX ADMIN — PANTOPRAZOLE SODIUM 40 MG: 40 TABLET, DELAYED RELEASE ORAL at 06:29

## 2024-01-20 RX ADMIN — ACETAMINOPHEN 975 MG: 325 TABLET ORAL at 06:29

## 2024-01-20 RX ADMIN — ENOXAPARIN SODIUM 40 MG: 40 INJECTION SUBCUTANEOUS at 09:40

## 2024-01-20 RX ADMIN — POTASSIUM CHLORIDE 20 MEQ: 1500 TABLET, EXTENDED RELEASE ORAL at 09:39

## 2024-01-20 RX ADMIN — SACUBITRIL AND VALSARTAN 1 TABLET: 49; 51 TABLET, FILM COATED ORAL at 09:38

## 2024-01-20 RX ADMIN — TICAGRELOR 60 MG: 60 TABLET ORAL at 11:00

## 2024-01-20 NOTE — DISCHARGE SUMMARY
Pj Clarke is a 63 y.o. female on day 0 of admission presenting with Cellulitis of foot.    Subjective   Reports improved erythema , still endorses difficulty bending toe due to pain         Your medication list        START taking these medications        Instructions Last Dose Given Next Dose Due   clindamycin 300 mg capsule  Commonly known as: Cleocin      Take 2 capsules (600 mg) by mouth every 8 hours for 10 days.       oxyCODONE 5 mg immediate release tablet  Commonly known as: Roxicodone      Take 1 tablet (5 mg) by mouth every 6 hours if needed for moderate pain (4 - 6).              CHANGE how you take these medications        Instructions Last Dose Given Next Dose Due   Mounjaro 2.5 mg/0.5 mL pen injector  Generic drug: tirzepatide  What changed: See the new instructions.      INJECT 2.5 MG UNDER THE SKIN ONE TIME PER WEEK              CONTINUE taking these medications        Instructions Last Dose Given Next Dose Due   aspirin 81 mg EC tablet           Basaglar KwikPen U-100 Insulin 100 unit/mL (3 mL) pen  Generic drug: insulin glargine      INJECT 35 UNITS UNDER THE SKIN TWO TIMES A DAY.       Brilinta 60 mg tablet  Generic drug: ticagrelor           Crestor 40 mg tablet  Generic drug: rosuvastatin      Take 1 tablet (40 mg) by mouth once daily.       Crestor 40 mg tablet  Generic drug: rosuvastatin      TAKE ONE TABLET BY MOUTH EVERY DAY       Entresto 49-51 mg tablet  Generic drug: sacubitriL-valsartan           ezetimibe 10 mg tablet  Commonly known as: Zetia           Farxiga 10 mg  Generic drug: dapagliflozin propanediol      TAKE ONE TABLET BY MOUTH EVERY DAY       furosemide 20 mg tablet  Commonly known as: Lasix           gabapentin 300 mg capsule  Commonly known as: Neurontin           glimepiride 4 mg tablet  Commonly known as: Amaryl           metFORMIN  mg 24 hr tablet  Commonly known as: Glucophage-XR           metoprolol succinate  mg 24 hr tablet  Commonly known as:  "Toprol-XL           omeprazole 40 mg DR capsule  Commonly known as: PriLOSEC      Take 1 capsule (40 mg) by mouth once daily. Do not crush or chew.       pen needle, diabetic 31 gauge x 1/4\" needle           BD Teri 2nd Gen Pen Needle 32 gauge x 5/32\" needle  Generic drug: pen needle, diabetic      USE TO INJECT INSULIN TWICE DAILY       potassium chloride CR 20 mEq ER tablet  Commonly known as: Klor-Con M20      Take 1 tablet (20 mEq) by mouth once daily.       spironolactone 25 mg tablet  Commonly known as: Aldactone                     Where to Get Your Medications        These medications were sent to [id number] - [place], [place] - [address] [facility]  [address] [facility] [address]      Phone: [phone]   clindamycin 300 mg capsule  oxyCODONE 5 mg immediate release tablet         Objective     Physical Exam  Constitutional:       Appearance: Normal appearance.   Cardiovascular:      Rate and Rhythm: Normal rate and regular rhythm.      Pulses: Normal pulses.      Heart sounds: Normal heart sounds. No murmur heard.     No gallop.   Pulmonary:      Effort: Pulmonary effort is normal. No respiratory distress.      Breath sounds: Normal breath sounds. No wheezing or rhonchi.   Abdominal:      General: Abdomen is flat. There is no distension.      Palpations: Abdomen is soft.      Tenderness: There is no abdominal tenderness. There is no guarding.   Musculoskeletal:         General: Swelling present.      Comments: To toes to right foot   Skin:     General: Skin is warm.      Capillary Refill: Capillary refill takes less than 2 seconds.      Findings: Bruising and erythema present.      Comments: To right great toe   Neurological:      Mental Status: She is alert and oriented to person, place, and time.   Psychiatric:         Mood and Affect: Mood normal.         Thought Content: Thought content normal.         Judgment: Judgment normal.         Last Recorded Vitals  Blood pressure 140/78, pulse 98, " "temperature 36.8 °C (98.2 °F), temperature source Temporal, resp. rate 18, height 1.78 m (5' 10.08\"), weight 104 kg (230 lb), SpO2 97 %.  Intake/Output last 3 Shifts:  I/O last 3 completed shifts:  In: 1100 (10.5 mL/kg) [I.V.:50 (0.5 mL/kg); IV Piggyback:1050]  Out: - (0 mL/kg)   Weight: 104.3 kg     Relevant Results  Scheduled medications  aspirin, 81 mg, oral, Daily  budesonide, 0.5 mg, nebulization, BID  clindamycin, 600 mg, intravenous, q8h  [Held by provider] clindamycin, 600 mg, oral, q8h  dapagliflozin propanediol, 10 mg, oral, Daily  enoxaparin, 40 mg, subcutaneous, q24h  ezetimibe, 10 mg, oral, Nightly  furosemide, 20 mg, oral, Daily  gabapentin, 300 mg, oral, Nightly  glimepiride, 4 mg, oral, BID  insulin glargine, 35 Units, subcutaneous, BID  insulin lispro, 0-10 Units, subcutaneous, TID with meals  [Held by provider] metFORMIN XR, 1,000 mg, oral, BID  metoprolol succinate XL, 100 mg, oral, q12h  pantoprazole, 40 mg, oral, Daily before breakfast  potassium chloride CR, 20 mEq, oral, Daily  rosuvastatin, 40 mg, oral, Daily  sacubitriL-valsartan, 1 tablet, oral, BID  spironolactone, 12.5 mg, oral, Daily  ticagrelor, 60 mg, oral, BID      Continuous medications     PRN medications  PRN medications: acetaminophen, dextrose 10 % in water (D10W), dextrose, docusate sodium, glucagon, melatonin, oxyCODONE-acetaminophen    Results for orders placed or performed during the hospital encounter of 01/18/24 (from the past 24 hour(s))   POCT GLUCOSE   Result Value Ref Range    POCT Glucose 168 (H) 74 - 99 mg/dL   POCT GLUCOSE   Result Value Ref Range    POCT Glucose 203 (H) 74 - 99 mg/dL   CBC   Result Value Ref Range    WBC 5.4 4.4 - 11.3 x10*3/uL    nRBC 0.0 0.0 - 0.0 /100 WBCs    RBC 4.43 4.00 - 5.20 x10*6/uL    Hemoglobin 13.3 12.0 - 16.0 g/dL    Hematocrit 41.1 36.0 - 46.0 %    MCV 93 80 - 100 fL    MCH 30.0 26.0 - 34.0 pg    MCHC 32.4 32.0 - 36.0 g/dL    RDW 14.9 (H) 11.5 - 14.5 %    Platelets 257 150 - 450 " x10*3/uL   Basic Metabolic Panel   Result Value Ref Range    Glucose 230 (H) 74 - 99 mg/dL    Sodium 134 (L) 136 - 145 mmol/L    Potassium 4.6 3.5 - 5.3 mmol/L    Chloride 106 98 - 107 mmol/L    Bicarbonate 18 (L) 21 - 32 mmol/L    Anion Gap 15 10 - 20 mmol/L    Urea Nitrogen 24 (H) 6 - 23 mg/dL    Creatinine 1.33 (H) 0.50 - 1.05 mg/dL    eGFR 45 (L) >60 mL/min/1.73m*2    Calcium 8.9 8.6 - 10.3 mg/dL   POCT GLUCOSE   Result Value Ref Range    POCT Glucose 179 (H) 74 - 99 mg/dL   Basic metabolic panel   Result Value Ref Range    Glucose 193 (H) 74 - 99 mg/dL    Sodium 136 136 - 145 mmol/L    Potassium 4.3 3.5 - 5.3 mmol/L    Chloride 106 98 - 107 mmol/L    Bicarbonate 20 (L) 21 - 32 mmol/L    Anion Gap 14 10 - 20 mmol/L    Urea Nitrogen 24 (H) 6 - 23 mg/dL    Creatinine 1.31 (H) 0.50 - 1.05 mg/dL    eGFR 46 (L) >60 mL/min/1.73m*2    Calcium 8.9 8.6 - 10.3 mg/dL       XR foot left 3+ views    Result Date: 1/20/2024  Interpreted By:  Chuck Wilkinson, STUDY: XR FOOT LEFT 3+ VIEWS;  1/19/2024 5:46 pm   INDICATION: Signs/Symptoms:Post closed reduction left hallux point of interest..   COMPARISON: 01/18/2024   ACCESSION NUMBER(S): [id number]   ORDERING CLINICIAN: HAYES AZUL   TECHNIQUE: 3 radiographs of the left foot are performed.   FINDINGS: There is a minimally displaced fracture at the base of the 1st distal phalanx at its lateral margin which is unchanged. There is surrounding soft tissue swelling. There may be mild subluxation at the 1st IP joint with some overlapping of the articular surfaces on the AP and oblique radiograph. On the lateral radiograph the 1st toe is difficult to evaluate due to overlapping structures.   The remainder of the foot is unchanged.       Persistent nondisplaced intra-articular fracture at the base of the 1st distal phalanx.   Suspected mild subluxation at the 1st IP joint. Further workup with dedicated 1st toe examination is recommended.   Signed by: Chuck Wilkinson  1/20/2024 8:22 AM Dictation workstation:   [id number]    XR foot left 3+ views    Result Date: 1/20/2024  Interpreted By:  Brant Wilkinson, STUDY: XR FOOT LEFT 3+ VIEWS;  1/18/2024 2:17 pm   INDICATION: Signs/Symptoms:MILD EDEMA.   COMPARISON: 11/22/2022   ACCESSION NUMBER(S): [id number]   ORDERING CLINICIAN: BRANT COLON   TECHNIQUE: 3 radiographs of the left foot are performed with weight-bearing.   FINDINGS: There is mild soft tissue swelling which is improved in the interval. The osseous structures are demineralized. There are mild osteoarthritic changes at multiple IP joints and 1st MTP joint. Moderate-sized calcaneal enthesophytes are identified. There is a suspected avulsion fracture at the base of the 1st distal phalanx at its lateral margin, extending to the articular cortex. There is surrounding soft tissue swelling. There is no bone destruction or aggressive periosteal reaction.       Minimally displaced intra-articular fracture at the base of the 1st distal phalanx.   Osteopenia.   Mild changes of osteoarthritis.   Signed by: Brant Wilkinson 1/20/2024 8:20 AM Dictation workstation:   [id number]    XR foot left 3+ views    Result Date: 1/18/2024  STUDY: Foot Radiographs; 1/18/24 at 7:36pm INDICATION: Fall. COMPARISON: 11/22/22 XR Foot, 4/19/22 XR Foot. ACCESSION NUMBER(S): [id number] ORDERING CLINICIAN: HAYES GARNER TECHNIQUE:  Three view(s) of the left foot. FINDINGS:  There is an acute, minimally displaced corner fracture at the lateral base of the first distal phalanx at the DIP joint. Additional small fracture fragment is suggested near the medial joint space. There is also likely subluxation at the DIP joint. Swelling is seen in the adjacent soft tissues. There is no other potential acute fracture or dislocation. There is minimal mid foot arthritis, and there is spurring at the calcaneus.    Acute fracture(s) first distal phalanx near the DIP joint, with likely DIP joint  subluxation. Remainder as above. Signed by Fab Lopez MD    XR hip left with pelvis when performed 2 or 3 views    Result Date: 1/18/2024  STUDY: Pelvis and Left Hip Radiographs; 1/18/2024, 7:23PM INDICATION: Pain after fall. COMPARISON: None Available. ACCESSION NUMBER(S): [id number] ORDERING CLINICIAN: HAYES GARNER TECHNIQUE:  AP view of the pelvis and two view(s) of the left hip. FINDINGS:  PELVIS: The pelvic ring is intact.  There is no acute fracture. LEFT HIP: There is no displaced fracture.  The alignment is anatomic.  No soft tissue abnormality is seen.    Moderate degenerative change of both hip joints. No definite acute osseous abnormality. If hip pain persists, suggest CT or MRI for further evaluation. Signed by Myron Grier MD    XR femur left 2+ views    Result Date: 1/18/2024  STUDY: Femur Radiographs; 1/18/2024, 7:23PM INDICATION: Pain after fall. COMPARISON: None Available. ACCESSION NUMBER(S): [id number] ORDERING CLINICIAN: HAYES GARNER TECHNIQUE:  Two view(s) (four images) of the left femur. FINDINGS:  There is no displaced fracture.  The alignment is anatomic.  No soft tissue abnormality is seen.    Degenerative changes of the left hip and knee joint. No definite acute osseous abnormality. Signed by Myron Grier MD    XR knee left 4+ views    Result Date: 1/18/2024  Interpreted By:  Sekou Chang, STUDY: XR KNEE LEFT 4+ VIEWS;  1/18/2024 7:22 pm   INDICATION: Signs/Symptoms:fall.   COMPARISON: None.   ACCESSION NUMBER(S): [id number]   ORDERING CLINICIAN: HAYES GARNER   FINDINGS: Four views of the left knee.   No acute fracture or dislocation. Mild-to-moderate degenerative changes with patellofemoral joint space narrowing and tricompartmental marginal osteophytosis. Small joint effusion.       1. No acute osseous abnormality identified. 2. Small joint effusion.       Signed by: Sekou Chang 1/18/2024 7:34 PM Dictation workstation:   [id number]            Assessment/Plan    Principal Problem:    Cellulitis of foot    Cellulitis  -CRP 1.0   -no leukocytosis   -afebrile   -lactate neg   -blood cultures prelim neg  -Xray foot- acute minimally displaced fracture at lateral base of first distal phalanx near DIP joint    -podiatry consulted, appreciate recs-> closed reduction of left hallux IPJ done 1/19/24-> post reduction Xray  -podiatry recs outpatient follow up with vascular for PVR's. No surgical intervention. Clear from podiatry standpoint  -post op shoe  -outline from yesterday outline cellulitis show erythema greatly improved, now localized to right great toe. Does have edema to toes of right foot     MIKEY 1.00-> 1.33  -IV bolus  -recheck     DM  -Accu-Checks ACHS  -Humalog correctional sliding scale    CAD/HTN  -resume home meds    DVT prophylaxis:  Lovenox, SCD    DC plan:  DC home when medically stable    Labs/Testing reviewed    Interdisciplinary team rounding completed with hospitalist, nurse, TCC    NP discussed plan and lab/testing results with Dr. De Los Santos. Bump in creat noted from 1.00-> 1.3. Small bolus, recheck still 1.3, discussed with Dr. De Los Santos, baseline appears anywhere from WNL to 1.2, ok to DC.  Will order outpatient follow up labs. Podiatry s/o       I spent 60 minutes in the professional and overall care of this patient.      Yesenia Moe, APRN-CNP

## 2024-01-20 NOTE — PROGRESS NOTES
Patient's toe was reduced by podiatry.  Post reduction xrays were completed.  Patient receiving IV antibiotics.  Will go home in post op shoe.  Patient to return home with no needs.  Will continue to follow for discharge planning needs.

## 2024-01-20 NOTE — CARE PLAN
The patient's goals for the shift include pain free, get rest    The clinical goals for the shift include pt will remain HDS and free from falls this shift    Problem: Pain  Goal: Takes deep breaths with improved pain control throughout the shift  Outcome: Progressing  Goal: Turns in bed with improved pain control throughout the shift  Outcome: Progressing  Goal: Walks with improved pain control throughout the shift  Outcome: Progressing  Goal: Performs ADL's with improved pain control throughout shift  Outcome: Progressing  Goal: Participates in PT with improved pain control throughout the shift  Outcome: Progressing  Goal: Free from opioid side effects throughout the shift  Outcome: Progressing  Goal: Free from acute confusion related to pain meds throughout the shift  Outcome: Progressing

## 2024-01-20 NOTE — PROGRESS NOTES
"Pj Clarke is a 63 y.o. female on day 0 of admission presenting with Cellulitis of foot.    Subjective   Patient seen and examined at bedside.  Patient states she is doing well and feels much better.  Patient does admit to some pain still to the left hallux however states that the pain is significantly decreased from prior.  Patient denies any constitutional symptoms.  Patient states that she has noticed her cellulitis improving as well.  Patient has no other pedal complaints this time.       Objective     REVIEW OF SYSTEMS  Negative except for where otherwise stated in HPI.      Physical Exam    Objective:     Last Recorded Vitals  Blood pressure 103/67, pulse 82, temperature 36.5 °C (97.7 °F), temperature source Temporal, resp. rate 18, height 1.78 m (5' 10.08\"), weight 104 kg (230 lb), SpO2 95 %.    Vasc: DP and PT pulses are faintly palpable bilateral.  CFT is less than 3 seconds bilateral.  Skin temperature is warm to cool proximal to distal bilateral. Moderate edema noted.  Erythema noted to the entire left forefoot with proximal streaking noted to the dorsal aspect of the left foot extending to the distal ankle.     Neuro:  Light touch is intact to the foot bilateral.  Protective sensation is diminished.  There is no clonus noted.     Derm: Nails 1-5 bilateral are intact.  Left hallux nail 1 is darkened with subungual hematoma.  Skin is diffusely xerotic.  Webspaces are clean, dry and intact bilateral.  There are no ulcerations or verruca noted.  Hyperkeratotic tissue noted to the plantar medial aspect of the left first MPJ.     MSK: Muscle strength is 5/5 for all pedal groups tested.  AJ and STJ motion is decreased without pain or crepitus.  The foot type is rectus bilateral off weight bearing. No pain to palpation at feet B/L.  Dorsal dislocation of the hallux distal phalanx noted.  Decreased pain to palpation of the left hallux at this time.  Tolerable    Intake/Output last 3 Shifts:  I/O last 3 " "completed shifts:  In: 1100 (10.5 mL/kg) [I.V.:50 (0.5 mL/kg); IV Piggyback:1050]  Out: - (0 mL/kg)   Weight: 104.3 kg     Relevant Results    Lab Results   Component Value Date    WBC 5.7 01/19/2024    HGB 12.4 01/19/2024    HCT 38.8 01/19/2024    MCV 92 01/19/2024     01/19/2024       Lab Results   Component Value Date    GLUCOSE 96 01/19/2024    CALCIUM 8.3 (L) 01/19/2024     01/19/2024    K 3.6 01/19/2024    CO2 19 (L) 01/19/2024     (H) 01/19/2024    BUN 30 (H) 01/19/2024    CREATININE 1.00 01/19/2024       Lab Results   Component Value Date    HGBA1C 7.7 (H) 01/19/2024      Lab Results   Component Value Date    CRP 1.00 (H) 01/18/2024      Lab Results   Component Value Date    SEDRATE 12 01/18/2024     No components found for: \"CMP\"       Results from last 7 days   Lab Units 01/19/24  0421   SODIUM mmol/L 136   POTASSIUM mmol/L 3.6   CHLORIDE mmol/L 108*   CO2 mmol/L 19*   BUN mg/dL 30*   CREATININE mg/dL 1.00   CALCIUM mg/dL 8.3*   MAGNESIUM mg/dL 1.60   BILIRUBIN TOTAL mg/dL 0.6   ALT U/L 18   AST U/L 22             IMAGING REVIEW:  XR foot left 3+ views    Result Date: 1/20/2024  Interpreted By:  Chuck Wilkinson, STUDY: XR FOOT LEFT 3+ VIEWS;  1/19/2024 5:46 pm   INDICATION: Signs/Symptoms:Post closed reduction left hallux point of interest..   COMPARISON: 01/18/2024   ACCESSION NUMBER(S): [id number]   ORDERING CLINICIAN: HAYES AZUL   TECHNIQUE: 3 radiographs of the left foot are performed.   FINDINGS: There is a minimally displaced fracture at the base of the 1st distal phalanx at its lateral margin which is unchanged. There is surrounding soft tissue swelling. There may be mild subluxation at the 1st IP joint with some overlapping of the articular surfaces on the AP and oblique radiograph. On the lateral radiograph the 1st toe is difficult to evaluate due to overlapping structures.   The remainder of the foot is unchanged.       Persistent nondisplaced intra-articular " fracture at the base of the 1st distal phalanx.   Suspected mild subluxation at the 1st IP joint. Further workup with dedicated 1st toe examination is recommended.   Signed by: Brant Wilkinson 1/20/2024 8:22 AM Dictation workstation:   [id number]    XR foot left 3+ views    Result Date: 1/20/2024  Interpreted By:  rBant Wilkinson, STUDY: XR FOOT LEFT 3+ VIEWS;  1/18/2024 2:17 pm   INDICATION: Signs/Symptoms:MILD EDEMA.   COMPARISON: 11/22/2022   ACCESSION NUMBER(S): [id number]   ORDERING CLINICIAN: BRANT COLON   TECHNIQUE: 3 radiographs of the left foot are performed with weight-bearing.   FINDINGS: There is mild soft tissue swelling which is improved in the interval. The osseous structures are demineralized. There are mild osteoarthritic changes at multiple IP joints and 1st MTP joint. Moderate-sized calcaneal enthesophytes are identified. There is a suspected avulsion fracture at the base of the 1st distal phalanx at its lateral margin, extending to the articular cortex. There is surrounding soft tissue swelling. There is no bone destruction or aggressive periosteal reaction.       Minimally displaced intra-articular fracture at the base of the 1st distal phalanx.   Osteopenia.   Mild changes of osteoarthritis.   Signed by: Brant Wilkinson 1/20/2024 8:20 AM Dictation workstation:   [id number]    XR foot left 3+ views    Result Date: 1/18/2024  STUDY: Foot Radiographs; 1/18/24 at 7:36pm INDICATION: Fall. COMPARISON: 11/22/22 XR Foot, 4/19/22 XR Foot. ACCESSION NUMBER(S): [id number] ORDERING CLINICIAN: HAYES GARNER TECHNIQUE:  Three view(s) of the left foot. FINDINGS:  There is an acute, minimally displaced corner fracture at the lateral base of the first distal phalanx at the DIP joint. Additional small fracture fragment is suggested near the medial joint space. There is also likely subluxation at the DIP joint. Swelling is seen in the adjacent soft tissues. There is no other  potential acute fracture or dislocation. There is minimal mid foot arthritis, and there is spurring at the calcaneus.    Acute fracture(s) first distal phalanx near the DIP joint, with likely DIP joint subluxation. Remainder as above. Signed by Fab Lopez MD    XR hip left with pelvis when performed 2 or 3 views    Result Date: 1/18/2024  STUDY: Pelvis and Left Hip Radiographs; 1/18/2024, 7:23PM INDICATION: Pain after fall. COMPARISON: None Available. ACCESSION NUMBER(S): [id number] ORDERING CLINICIAN: HAYES GARNER TECHNIQUE:  AP view of the pelvis and two view(s) of the left hip. FINDINGS:  PELVIS: The pelvic ring is intact.  There is no acute fracture. LEFT HIP: There is no displaced fracture.  The alignment is anatomic.  No soft tissue abnormality is seen.    Moderate degenerative change of both hip joints. No definite acute osseous abnormality. If hip pain persists, suggest CT or MRI for further evaluation. Signed by Myron Grier MD    XR femur left 2+ views    Result Date: 1/18/2024  STUDY: Femur Radiographs; 1/18/2024, 7:23PM INDICATION: Pain after fall. COMPARISON: None Available. ACCESSION NUMBER(S): [id number] ORDERING CLINICIAN: HAYES GARNER TECHNIQUE:  Two view(s) (four images) of the left femur. FINDINGS:  There is no displaced fracture.  The alignment is anatomic.  No soft tissue abnormality is seen.    Degenerative changes of the left hip and knee joint. No definite acute osseous abnormality. Signed by Myron Grier MD    XR knee left 4+ views    Result Date: 1/18/2024  Interpreted By:  Sekou Chang, STUDY: XR KNEE LEFT 4+ VIEWS;  1/18/2024 7:22 pm   INDICATION: Signs/Symptoms:fall.   COMPARISON: None.   ACCESSION NUMBER(S): [id number]   ORDERING CLINICIAN: HAYES GARNER   FINDINGS: Four views of the left knee.   No acute fracture or dislocation. Mild-to-moderate degenerative changes with patellofemoral joint space narrowing and tricompartmental marginal osteophytosis. Small joint  effusion.       1. No acute osseous abnormality identified. 2. Small joint effusion.       Signed by: Sekou Chang 1/18/2024 7:34 PM Dictation workstation:   [id number]            ASSESSMENT & PLAN:  Assessment/Plan   Principal Problem:    Cellulitis of foot      Dislocated hallux at the IPJ, left foot  Cellulitis, left foot  T2DM with mild peripheral neuropathy.        Plan:  -Patient seen and examined at bedside. All findings discussed with patient.   -Pain improved from yesterday.  Cellulitis improved from yesterday.  -Of note patient has a medium size postop shoe that does not fit her foot and has her toe hanging over.  Please acquire large postop shoe from the ED or general supply.  Staff is aware.  -IV ABX per primary.  Recommend patient be sent with approximately 2-week course of oral antibiotics.  -X-ray read as above.  Clinically the fracture site is improved.  -Patient follows with vascular.  Recommend patient follow-up outpatient due to his left toe pallor and dependency.  Recommend patient follow-up with vascular outpatient for PVRs as well.  -No dressings necessary  -No plan for surgical intervention  -Patient may follow-up outpatient with podiatry.  Patient is aware.  This will be at the [facility].  Please follow up at the [place] office located [address] [place] [address].  You may call [phone]  -Podiatry will sign off at this time       Diet: Per primary  DVT ppx: Per primary  Weightbearing: Weightbearing as tolerated in postop shoe.  Wound Care: N/A     Case has been discussed with attending, A&P above reflect tentative plan. Please await final signature from attending physician on service.  Harpreet Almanza, SANTOSHM PGY-3  Please use Secure Chat if any questions

## 2024-01-20 NOTE — PROGRESS NOTES
"Pj Clarke is a 63 y.o. female on day 0 of admission presenting with Cellulitis of foot.    Subjective   Reports improved erythema , still endorses difficulty bending toe due to pain        Objective     Physical Exam  Constitutional:       Appearance: Normal appearance.   Cardiovascular:      Rate and Rhythm: Normal rate and regular rhythm.      Pulses: Normal pulses.      Heart sounds: Normal heart sounds. No murmur heard.     No gallop.   Pulmonary:      Effort: Pulmonary effort is normal. No respiratory distress.      Breath sounds: Normal breath sounds. No wheezing or rhonchi.   Abdominal:      General: Abdomen is flat. There is no distension.      Palpations: Abdomen is soft.      Tenderness: There is no abdominal tenderness. There is no guarding.   Musculoskeletal:         General: Swelling present.      Comments: To toes to right foot   Skin:     General: Skin is warm.      Capillary Refill: Capillary refill takes less than 2 seconds.      Findings: Bruising and erythema present.      Comments: To right great toe   Neurological:      Mental Status: She is alert and oriented to person, place, and time.   Psychiatric:         Mood and Affect: Mood normal.         Thought Content: Thought content normal.         Judgment: Judgment normal.         Last Recorded Vitals  Blood pressure 103/67, pulse 82, temperature 36.5 °C (97.7 °F), temperature source Temporal, resp. rate 18, height 1.78 m (5' 10.08\"), weight 104 kg (230 lb), SpO2 95 %.  Intake/Output last 3 Shifts:  I/O last 3 completed shifts:  In: 1100 (10.5 mL/kg) [I.V.:50 (0.5 mL/kg); IV Piggyback:1050]  Out: - (0 mL/kg)   Weight: 104.3 kg     Relevant Results  Scheduled medications  aspirin, 81 mg, oral, Daily  budesonide, 0.5 mg, nebulization, BID  clindamycin, 600 mg, intravenous, q8h  [Held by provider] clindamycin, 600 mg, oral, q8h  dapagliflozin propanediol, 10 mg, oral, Daily  enoxaparin, 40 mg, subcutaneous, q24h  ezetimibe, 10 mg, oral, " Nightly  furosemide, 20 mg, oral, Daily  gabapentin, 300 mg, oral, Nightly  glimepiride, 4 mg, oral, BID  insulin glargine, 35 Units, subcutaneous, BID  insulin lispro, 0-10 Units, subcutaneous, TID with meals  [Held by provider] metFORMIN XR, 1,000 mg, oral, BID  metoprolol succinate XL, 100 mg, oral, q12h  pantoprazole, 40 mg, oral, Daily before breakfast  potassium chloride CR, 20 mEq, oral, Daily  rosuvastatin, 40 mg, oral, Daily  sacubitriL-valsartan, 1 tablet, oral, BID  spironolactone, 12.5 mg, oral, Daily  ticagrelor, 60 mg, oral, BID      Continuous medications     PRN medications  PRN medications: acetaminophen, dextrose 10 % in water (D10W), dextrose, docusate sodium, glucagon, melatonin, oxyCODONE-acetaminophen    Results for orders placed or performed during the hospital encounter of 01/18/24 (from the past 24 hour(s))   POCT GLUCOSE   Result Value Ref Range    POCT Glucose 110 (H) 74 - 99 mg/dL   POCT GLUCOSE   Result Value Ref Range    POCT Glucose 168 (H) 74 - 99 mg/dL   POCT GLUCOSE   Result Value Ref Range    POCT Glucose 203 (H) 74 - 99 mg/dL       XR foot left 3+ views    Result Date: 1/20/2024  Interpreted By:  Chuck Wilkinson, STUDY: XR FOOT LEFT 3+ VIEWS;  1/19/2024 5:46 pm   INDICATION: Signs/Symptoms:Post closed reduction left hallux point of interest..   COMPARISON: 01/18/2024   ACCESSION NUMBER(S): [id number]   ORDERING CLINICIAN: HAYES AZUL   TECHNIQUE: 3 radiographs of the left foot are performed.   FINDINGS: There is a minimally displaced fracture at the base of the 1st distal phalanx at its lateral margin which is unchanged. There is surrounding soft tissue swelling. There may be mild subluxation at the 1st IP joint with some overlapping of the articular surfaces on the AP and oblique radiograph. On the lateral radiograph the 1st toe is difficult to evaluate due to overlapping structures.   The remainder of the foot is unchanged.       Persistent nondisplaced  intra-articular fracture at the base of the 1st distal phalanx.   Suspected mild subluxation at the 1st IP joint. Further workup with dedicated 1st toe examination is recommended.   Signed by: Brant Wilkinson 1/20/2024 8:22 AM Dictation workstation:   [id number]    XR foot left 3+ views    Result Date: 1/20/2024  Interpreted By:  Brant Wilkinson, STUDY: XR FOOT LEFT 3+ VIEWS;  1/18/2024 2:17 pm   INDICATION: Signs/Symptoms:MILD EDEMA.   COMPARISON: 11/22/2022   ACCESSION NUMBER(S): [id number]   ORDERING CLINICIAN: BRANT COLON   TECHNIQUE: 3 radiographs of the left foot are performed with weight-bearing.   FINDINGS: There is mild soft tissue swelling which is improved in the interval. The osseous structures are demineralized. There are mild osteoarthritic changes at multiple IP joints and 1st MTP joint. Moderate-sized calcaneal enthesophytes are identified. There is a suspected avulsion fracture at the base of the 1st distal phalanx at its lateral margin, extending to the articular cortex. There is surrounding soft tissue swelling. There is no bone destruction or aggressive periosteal reaction.       Minimally displaced intra-articular fracture at the base of the 1st distal phalanx.   Osteopenia.   Mild changes of osteoarthritis.   Signed by: Brant Wilkinson 1/20/2024 8:20 AM Dictation workstation:   [id number]    XR foot left 3+ views    Result Date: 1/18/2024  STUDY: Foot Radiographs; 1/18/24 at 7:36pm INDICATION: Fall. COMPARISON: 11/22/22 XR Foot, 4/19/22 XR Foot. ACCESSION NUMBER(S): [id number] ORDERING CLINICIAN: HAYES GARNER TECHNIQUE:  Three view(s) of the left foot. FINDINGS:  There is an acute, minimally displaced corner fracture at the lateral base of the first distal phalanx at the DIP joint. Additional small fracture fragment is suggested near the medial joint space. There is also likely subluxation at the DIP joint. Swelling is seen in the adjacent soft tissues. There is  no other potential acute fracture or dislocation. There is minimal mid foot arthritis, and there is spurring at the calcaneus.    Acute fracture(s) first distal phalanx near the DIP joint, with likely DIP joint subluxation. Remainder as above. Signed by Fab Lopez MD    XR hip left with pelvis when performed 2 or 3 views    Result Date: 1/18/2024  STUDY: Pelvis and Left Hip Radiographs; 1/18/2024, 7:23PM INDICATION: Pain after fall. COMPARISON: None Available. ACCESSION NUMBER(S): [id number] ORDERING CLINICIAN: HAYES GARNER TECHNIQUE:  AP view of the pelvis and two view(s) of the left hip. FINDINGS:  PELVIS: The pelvic ring is intact.  There is no acute fracture. LEFT HIP: There is no displaced fracture.  The alignment is anatomic.  No soft tissue abnormality is seen.    Moderate degenerative change of both hip joints. No definite acute osseous abnormality. If hip pain persists, suggest CT or MRI for further evaluation. Signed by Myron Grier MD    XR femur left 2+ views    Result Date: 1/18/2024  STUDY: Femur Radiographs; 1/18/2024, 7:23PM INDICATION: Pain after fall. COMPARISON: None Available. ACCESSION NUMBER(S): [id number] ORDERING CLINICIAN: HAYES GARNER TECHNIQUE:  Two view(s) (four images) of the left femur. FINDINGS:  There is no displaced fracture.  The alignment is anatomic.  No soft tissue abnormality is seen.    Degenerative changes of the left hip and knee joint. No definite acute osseous abnormality. Signed by Myron Grier MD    XR knee left 4+ views    Result Date: 1/18/2024  Interpreted By:  Sekou Chang, STUDY: XR KNEE LEFT 4+ VIEWS;  1/18/2024 7:22 pm   INDICATION: Signs/Symptoms:fall.   COMPARISON: None.   ACCESSION NUMBER(S): [id number]   ORDERING CLINICIAN: HAYES GARNER   FINDINGS: Four views of the left knee.   No acute fracture or dislocation. Mild-to-moderate degenerative changes with patellofemoral joint space narrowing and tricompartmental marginal osteophytosis.  Small joint effusion.       1. No acute osseous abnormality identified. 2. Small joint effusion.       Signed by: Sekou Chang 1/18/2024 7:34 PM Dictation workstation:   [id number]            Assessment/Plan   Principal Problem:    Cellulitis of foot    Cellulitis  -CRP 1.0   -no leukocytosis   -afebrile   -lactate neg   -blood cultures prelim neg  -Xray foot- acute minimally displaced fracture at lateral base of first distal phalanx near DIP joint    -podiatry consulted, appreciate recs-> closed reduction of left hallux IPJ done 1/19/24-> post reduction Xray  -podiatry recs outpatient follow up with vascular for PVR's. No surgical intervention. Clear from podiatry standpoint  -post op shoe  -outline from yesterday outline cellulitis show erythema greatly improved, now localized to right great toe. Does have edema to toes of right foot     MIKEY 1.00-> 1.33  -IV bolus  -recheck     DM  -Accu-Checks ACHS  -Humalog correctional sliding scale    CAD/HTN  -resume home meds    DVT prophylaxis:  Lovenox, SCD    DC plan:  DC home when medically stable    Labs/Testing reviewed    Interdisciplinary team rounding completed with hospitalist, nurse, TCC    NP discussed plan and lab/testing results with Dr. De Los Santos. Bump in creat noted from 1.00-> 1.3. Small bolus, then recheck. IF improved can DC. Podiatry s/o       I spent 60 minutes in the professional and overall care of this patient.      Yesenia Moe, APRN-CNP

## 2024-01-20 NOTE — HOSPITAL COURSE
Pj Clarke is a 63 y.o. female presenting with left foot pain.  Patient states that she fell on the ice 2 days ago. States she did curl her left toes and initially had some pain involving her left knee, left femur, and left foot.  Initially she did not think much of the fall until she woke up the next day and symptoms started to progress and she was worried about a potential infection..  She states that she followed up with her primary care doctor who encouraged her to follow-up at the emergency room.     She reports having erythema and tenderness throughout her left foot, digits 1 and 2.  The remaining portion of her foot both dorsal and ventral aspect and the remaining 3 toes are completely asymptomatic.  Her knee is asymptomatic and her right lower extremity is asymptomatic.     When she followed up with her primary earlier, they did obtain x-rays of her foot and tried to debride her first toenail.  The x-rays came back and showed possible fracture and with the surgeon of potential infection they sent her to the emergency room for a stronger dosage of antibiotics.     She has significant past medical history of diabetes insulin-dependent, hypertension, and CAD and dual chamber pacemaker     Cellulitis  -CRP 1.0   -no leukocytosis   -afebrile   -lactate neg   -blood cultures prelim neg  -Xray foot- acute minimally displaced fracture at lateral base of first distal phalanx near DIP joint    -podiatry consulted, appreciate recs-> closed reduction of left hallux IPJ done 1/19/24-> post reduction Xray  -podiatry recs outpatient follow up with vascular for PVR's. No surgical intervention. Clear from podiatry standpoint  -post op shoe    DM  -Accu-Checks ACHS  -Humalog correctional sliding scale    CAD/HTN  -resume home meds    DVT prophylaxis:  Lovenox, SCD    DC plan:  DC home when medically stable    Labs/Testing reviewed    Interdisciplinary team rounding completed with hospitalist, nurse, TCC    NP  discussed plan and lab/testing results with Dr. De Los Santos

## 2024-01-23 LAB
BACTERIA BLD CULT: NORMAL
BACTERIA BLD CULT: NORMAL

## 2024-01-29 DIAGNOSIS — E11.3213 TYPE 2 DIABETES MELLITUS WITH BOTH EYES AFFECTED BY MILD NONPROLIFERATIVE RETINOPATHY AND MACULAR EDEMA, WITH LONG-TERM CURRENT USE OF INSULIN (MULTI): ICD-10-CM

## 2024-01-29 DIAGNOSIS — Z79.4 TYPE 2 DIABETES MELLITUS WITH BOTH EYES AFFECTED BY MILD NONPROLIFERATIVE RETINOPATHY AND MACULAR EDEMA, WITH LONG-TERM CURRENT USE OF INSULIN (MULTI): ICD-10-CM

## 2024-01-29 RX ORDER — TIRZEPATIDE 2.5 MG/.5ML
INJECTION, SOLUTION SUBCUTANEOUS
Qty: 2 ML | Refills: 0 | Status: SHIPPED | OUTPATIENT
Start: 2024-01-29 | End: 2024-03-12 | Stop reason: ALTCHOICE

## 2024-02-12 DIAGNOSIS — E11.40 TYPE 2 DIABETES MELLITUS WITH DIABETIC NEUROPATHY, UNSPECIFIED WHETHER LONG TERM INSULIN USE (MULTI): Primary | ICD-10-CM

## 2024-02-12 DIAGNOSIS — I50.22 CHRONIC SYSTOLIC CONGESTIVE HEART FAILURE (MULTI): ICD-10-CM

## 2024-02-18 LAB
ATRIAL RATE: 84 BPM
P AXIS: 68 DEGREES
P OFFSET: 185 MS
P ONSET: 130 MS
PR INTERVAL: 182 MS
Q ONSET: 221 MS
QRS COUNT: 14 BEATS
QRS DURATION: 136 MS
QT INTERVAL: 420 MS
QTC CALCULATION(BAZETT): 496 MS
QTC FREDERICIA: 469 MS
R AXIS: 30 DEGREES
T AXIS: 45 DEGREES
T OFFSET: 431 MS
VENTRICULAR RATE: 84 BPM

## 2024-02-23 ENCOUNTER — HOSPITAL ENCOUNTER (OUTPATIENT)
Dept: RADIOLOGY | Facility: HOSPITAL | Age: 64
Discharge: HOME | End: 2024-02-23
Payer: COMMERCIAL

## 2024-02-23 ENCOUNTER — LAB (OUTPATIENT)
Dept: LAB | Facility: LAB | Age: 64
End: 2024-02-23
Payer: COMMERCIAL

## 2024-02-23 ENCOUNTER — OFFICE VISIT (OUTPATIENT)
Dept: PRIMARY CARE | Facility: CLINIC | Age: 64
End: 2024-02-23
Payer: COMMERCIAL

## 2024-02-23 VITALS
DIASTOLIC BLOOD PRESSURE: 61 MMHG | WEIGHT: 230 LBS | HEART RATE: 80 BPM | OXYGEN SATURATION: 95 % | SYSTOLIC BLOOD PRESSURE: 95 MMHG | BODY MASS INDEX: 32.93 KG/M2

## 2024-02-23 DIAGNOSIS — N17.9 AKI (ACUTE KIDNEY INJURY) (CMS-HCC): ICD-10-CM

## 2024-02-23 DIAGNOSIS — E11.69 TYPE 2 DIABETES MELLITUS WITH OTHER SPECIFIED COMPLICATION, WITH LONG-TERM CURRENT USE OF INSULIN (MULTI): ICD-10-CM

## 2024-02-23 DIAGNOSIS — R60.0 LOWER EXTREMITY EDEMA: Primary | ICD-10-CM

## 2024-02-23 DIAGNOSIS — Z79.4 TYPE 2 DIABETES MELLITUS WITH OTHER SPECIFIED COMPLICATION, WITH LONG-TERM CURRENT USE OF INSULIN (MULTI): ICD-10-CM

## 2024-02-23 DIAGNOSIS — M54.10 RADICULAR PAIN: ICD-10-CM

## 2024-02-23 DIAGNOSIS — R60.0 LOWER EXTREMITY EDEMA: ICD-10-CM

## 2024-02-23 LAB
ALBUMIN SERPL BCP-MCNC: 4.6 G/DL (ref 3.4–5)
ANION GAP SERPL CALC-SCNC: 18 MMOL/L (ref 10–20)
BUN SERPL-MCNC: 21 MG/DL (ref 6–23)
CALCIUM SERPL-MCNC: 9.8 MG/DL (ref 8.6–10.6)
CHLORIDE SERPL-SCNC: 108 MMOL/L (ref 98–107)
CO2 SERPL-SCNC: 19 MMOL/L (ref 21–32)
CREAT SERPL-MCNC: 1.04 MG/DL (ref 0.5–1.05)
EGFRCR SERPLBLD CKD-EPI 2021: 60 ML/MIN/1.73M*2
GLUCOSE SERPL-MCNC: 164 MG/DL (ref 74–99)
PHOSPHATE SERPL-MCNC: 3.8 MG/DL (ref 2.5–4.9)
POTASSIUM SERPL-SCNC: 4.9 MMOL/L (ref 3.5–5.3)
SODIUM SERPL-SCNC: 140 MMOL/L (ref 136–145)

## 2024-02-23 PROCEDURE — 3074F SYST BP LT 130 MM HG: CPT | Performed by: STUDENT IN AN ORGANIZED HEALTH CARE EDUCATION/TRAINING PROGRAM

## 2024-02-23 PROCEDURE — 99214 OFFICE O/P EST MOD 30 MIN: CPT | Performed by: STUDENT IN AN ORGANIZED HEALTH CARE EDUCATION/TRAINING PROGRAM

## 2024-02-23 PROCEDURE — 36415 COLL VENOUS BLD VENIPUNCTURE: CPT

## 2024-02-23 PROCEDURE — 93971 EXTREMITY STUDY: CPT | Performed by: STUDENT IN AN ORGANIZED HEALTH CARE EDUCATION/TRAINING PROGRAM

## 2024-02-23 PROCEDURE — 80069 RENAL FUNCTION PANEL: CPT

## 2024-02-23 PROCEDURE — 93971 EXTREMITY STUDY: CPT

## 2024-02-23 PROCEDURE — 3078F DIAST BP <80 MM HG: CPT | Performed by: STUDENT IN AN ORGANIZED HEALTH CARE EDUCATION/TRAINING PROGRAM

## 2024-02-23 PROCEDURE — 3051F HG A1C>EQUAL 7.0%<8.0%: CPT | Performed by: STUDENT IN AN ORGANIZED HEALTH CARE EDUCATION/TRAINING PROGRAM

## 2024-02-23 PROCEDURE — 1036F TOBACCO NON-USER: CPT | Performed by: STUDENT IN AN ORGANIZED HEALTH CARE EDUCATION/TRAINING PROGRAM

## 2024-02-23 RX ORDER — TIZANIDINE 2 MG/1
2 TABLET ORAL EVERY 6 HOURS PRN
Qty: 30 TABLET | Refills: 0 | Status: SHIPPED | OUTPATIENT
Start: 2024-02-23 | End: 2024-04-05 | Stop reason: ALTCHOICE

## 2024-02-23 NOTE — PATIENT INSTRUCTIONS
Labs in [address]    Ultrasound in [address] to rule out clot    Leg elevation, rest, ice, compression stocking for right lower extremity   Continue Tylenol, avoid other over the counter pain medication  Tizanidine (low dose muscle relaxant) for back pain  Can try increased dose of Gabapentin from 300mg once daily to twice daily     Pharmacy referral for support with diabetes  We will get the Mounjaro re-started when you are ready     Great seeing you today!    Best,  Dr. BAH

## 2024-02-23 NOTE — PROGRESS NOTES
Subjective   Patient ID: Pj Clarke is a 63 y.o. female who presents for Follow-up, low back pain.     HPI   She was recently hospitalized after fall with subsequent displaced fracture of distal L hallux and associated cellulitis. She has been following closely with podiatry since hospitalization. Remains in boot at this time for protection with improvement in swelling and pain. Modified activity to help support continued healing. In setting of gait being impacted by this, she is now having some R lower back pain with radicular features. Discussed conservative treatment, which she has been doing at home. Will add low dose muscle relaxant to regimen. Can also dose titrate Gabapentin that she is already on for diabetic neuropathy. Defer systemic steroids given IDDM status. Continue close monitoring for symptom evolution/worsening but at present no focal weakness/bowel or bladder incontinence/etc.     During hospitalization, she did have mild MIKEY for which repeat labs today obtained to monitor renal function. Returned with normalization to prior baseline.     Also discussed GLP-1 medication, which she has been off of. Plan to resume and titrate accordingly. Interested in pharmacy referral to help with titration between visits, referral placed today.     She does have RLE edema on exam today. She has some degree of baseline swelling in this extremity compared to LLE. Denies significant calf pain. No warmth or particular TTP overlying this area. Has been seen by vascular in the past (Dr. Murray). Given recent hospitalization, however, will proceed with duplex to rule out clot. Pending negative, recommend supportive measures (elevation, compression stockings) as recommended in the past.     Continues to follow closely with cardiology, Dr. Hutchison, for her CAD and HFmrEF history. Updated labs today to ensure reassuring lytes and renal function on current regimen.     Review of Systems  As per HPI     Objective   BP  95/61   Pulse 80   Wt 104 kg (230 lb)   SpO2 95%   BMI 32.93 kg/m²     Physical Exam  General: well appearing  female in NAD  HEENT: NCAT, MMM, large neck circumference, corrective lenses in place  CV: RRR  PULM: CTAB  ABD: soft, obese, NT, ND  EXT: L foot in boot; removed with some bruising and swelling of L hallux (recent known injury); RLE with ankle/calf swelling and larger circumference compared to LLE, no significant pain of calf, preserved DP pulses   NEURO/PSYCH: A&Ox4, gait impacted by recent injury with boot in place on LLE, symmetric facies, appropriate mentation     Assessment/Plan   Problem List Items Addressed This Visit             ICD-10-CM    Lower extremity edema - Primary R60.0    Relevant Orders    Vascular US lower extremity venous duplex right (Completed)     Other Visit Diagnoses         Codes    MIKEY (acute kidney injury) (CMS/[facility])     N17.9    Relevant Orders    Renal function panel (Completed)    Radicular pain     M54.10    Relevant Medications    tiZANidine (Zanaflex) 2 mg tablet    Type 2 diabetes mellitus with other specified complication, with long-term current use of insulin (CMS/[facility])     E11.69, Z79.4    Relevant Orders    Follow Up In [facility] - Pharmacy          Plan:   Labs in [address]    Ultrasound in [address] to rule out clot    Leg elevation, rest, ice, compression stocking for right lower extremity   Continue Tylenol, avoid other over the counter pain medication  Tizanidine (low dose muscle relaxant) for back pain  Can try increased dose of Gabapentin from 300mg once daily to twice daily     Pharmacy referral for support with diabetes  We will get the Mounjaro re-started when you are ready    Sigifredo Webster MD

## 2024-03-05 DIAGNOSIS — M79.606 PAIN OF LOWER EXTREMITY, UNSPECIFIED LATERALITY: Primary | ICD-10-CM

## 2024-03-05 DIAGNOSIS — K21.9 GASTROESOPHAGEAL REFLUX DISEASE, UNSPECIFIED WHETHER ESOPHAGITIS PRESENT: ICD-10-CM

## 2024-03-05 RX ORDER — GABAPENTIN 300 MG/1
300 CAPSULE ORAL NIGHTLY
Qty: 90 CAPSULE | Refills: 0 | Status: SHIPPED | OUTPATIENT
Start: 2024-03-05 | End: 2024-06-11 | Stop reason: SDUPTHER

## 2024-03-05 RX ORDER — OMEPRAZOLE 40 MG/1
40 CAPSULE, DELAYED RELEASE ORAL DAILY
Qty: 90 CAPSULE | Refills: 0 | Status: SHIPPED | OUTPATIENT
Start: 2024-03-05

## 2024-03-07 DIAGNOSIS — E11.69 TYPE 2 DIABETES MELLITUS WITH OTHER SPECIFIED COMPLICATION, WITH LONG-TERM CURRENT USE OF INSULIN (MULTI): ICD-10-CM

## 2024-03-07 DIAGNOSIS — Z79.4 TYPE 2 DIABETES MELLITUS WITH OTHER SPECIFIED COMPLICATION, WITH LONG-TERM CURRENT USE OF INSULIN (MULTI): ICD-10-CM

## 2024-03-07 RX ORDER — METFORMIN HYDROCHLORIDE 500 MG/1
1000 TABLET, EXTENDED RELEASE ORAL 2 TIMES DAILY
Qty: 120 TABLET | Refills: 0 | Status: SHIPPED | OUTPATIENT
Start: 2024-03-07 | End: 2024-04-15 | Stop reason: SDUPTHER

## 2024-03-07 RX ORDER — GLIMEPIRIDE 4 MG/1
4 TABLET ORAL 2 TIMES DAILY
Qty: 60 TABLET | Refills: 0 | Status: SHIPPED | OUTPATIENT
Start: 2024-03-07 | End: 2024-04-15 | Stop reason: SDUPTHER

## 2024-03-12 DIAGNOSIS — Z79.4 TYPE 2 DIABETES MELLITUS WITH OTHER SPECIFIED COMPLICATION, WITH LONG-TERM CURRENT USE OF INSULIN (MULTI): Primary | ICD-10-CM

## 2024-03-12 DIAGNOSIS — E11.69 TYPE 2 DIABETES MELLITUS WITH OTHER SPECIFIED COMPLICATION, WITH LONG-TERM CURRENT USE OF INSULIN (MULTI): Primary | ICD-10-CM

## 2024-03-12 RX ORDER — TIRZEPATIDE 5 MG/.5ML
5 INJECTION, SOLUTION SUBCUTANEOUS
Qty: 2 ML | Refills: 0 | Status: SHIPPED | OUTPATIENT
Start: 2024-03-12 | End: 2024-04-18 | Stop reason: ALTCHOICE

## 2024-03-14 DIAGNOSIS — E11.9 TYPE 2 DIABETES MELLITUS TREATED WITH INSULIN (MULTI): ICD-10-CM

## 2024-03-14 DIAGNOSIS — Z79.4 TYPE 2 DIABETES MELLITUS TREATED WITH INSULIN (MULTI): ICD-10-CM

## 2024-03-18 ENCOUNTER — OFFICE VISIT (OUTPATIENT)
Dept: PRIMARY CARE | Facility: CLINIC | Age: 64
End: 2024-03-18
Payer: COMMERCIAL

## 2024-03-18 ENCOUNTER — HOSPITAL ENCOUNTER (OUTPATIENT)
Dept: RADIOLOGY | Facility: CLINIC | Age: 64
Discharge: HOME | End: 2024-03-18
Payer: COMMERCIAL

## 2024-03-18 VITALS
OXYGEN SATURATION: 98 % | WEIGHT: 230 LBS | HEART RATE: 89 BPM | BODY MASS INDEX: 32.93 KG/M2 | SYSTOLIC BLOOD PRESSURE: 122 MMHG | DIASTOLIC BLOOD PRESSURE: 78 MMHG

## 2024-03-18 DIAGNOSIS — R09.89 WEAK ARTERIAL PULSE: ICD-10-CM

## 2024-03-18 DIAGNOSIS — M79.671 RIGHT FOOT PAIN: ICD-10-CM

## 2024-03-18 DIAGNOSIS — R22.41 LOCALIZED SWELLING OF RIGHT FOOT: ICD-10-CM

## 2024-03-18 DIAGNOSIS — M79.671 RIGHT FOOT PAIN: Primary | ICD-10-CM

## 2024-03-18 PROCEDURE — 1036F TOBACCO NON-USER: CPT | Performed by: STUDENT IN AN ORGANIZED HEALTH CARE EDUCATION/TRAINING PROGRAM

## 2024-03-18 PROCEDURE — 73610 X-RAY EXAM OF ANKLE: CPT | Mod: RIGHT SIDE | Performed by: RADIOLOGY

## 2024-03-18 PROCEDURE — 3078F DIAST BP <80 MM HG: CPT | Performed by: STUDENT IN AN ORGANIZED HEALTH CARE EDUCATION/TRAINING PROGRAM

## 2024-03-18 PROCEDURE — 3074F SYST BP LT 130 MM HG: CPT | Performed by: STUDENT IN AN ORGANIZED HEALTH CARE EDUCATION/TRAINING PROGRAM

## 2024-03-18 PROCEDURE — 3051F HG A1C>EQUAL 7.0%<8.0%: CPT | Performed by: STUDENT IN AN ORGANIZED HEALTH CARE EDUCATION/TRAINING PROGRAM

## 2024-03-18 PROCEDURE — 73610 X-RAY EXAM OF ANKLE: CPT | Mod: RT

## 2024-03-18 PROCEDURE — 99213 OFFICE O/P EST LOW 20 MIN: CPT | Performed by: STUDENT IN AN ORGANIZED HEALTH CARE EDUCATION/TRAINING PROGRAM

## 2024-03-18 RX ORDER — DAPAGLIFLOZIN 10 MG/1
10 TABLET, FILM COATED ORAL DAILY
Qty: 90 TABLET | Refills: 1 | Status: SHIPPED | OUTPATIENT
Start: 2024-03-18

## 2024-03-18 NOTE — PATIENT INSTRUCTIONS
Thank you for coming today Pj!    Please get an x-ray downstairs. You can get this done on the lower level in [address]. The phone number is [phone].     Please schedule your LEA and arterial studies. Please call [phone] and press 1 to speak with the folks at [facility].

## 2024-03-19 ENCOUNTER — HOSPITAL ENCOUNTER (OUTPATIENT)
Dept: CARDIOLOGY | Facility: CLINIC | Age: 64
Discharge: HOME | End: 2024-03-19
Payer: COMMERCIAL

## 2024-03-19 DIAGNOSIS — I47.20 VT (VENTRICULAR TACHYCARDIA) (MULTI): ICD-10-CM

## 2024-03-19 PROCEDURE — 93295 DEV INTERROG REMOTE 1/2/MLT: CPT | Performed by: INTERNAL MEDICINE

## 2024-03-19 PROCEDURE — 93296 REM INTERROG EVL PM/IDS: CPT

## 2024-03-28 ENCOUNTER — HOSPITAL ENCOUNTER (OUTPATIENT)
Dept: CARDIOLOGY | Facility: CLINIC | Age: 64
Discharge: HOME | End: 2024-03-28
Payer: COMMERCIAL

## 2024-03-28 DIAGNOSIS — I47.20 VT (VENTRICULAR TACHYCARDIA) (MULTI): ICD-10-CM

## 2024-04-05 ENCOUNTER — OFFICE VISIT (OUTPATIENT)
Dept: PRIMARY CARE | Facility: CLINIC | Age: 64
End: 2024-04-05
Payer: COMMERCIAL

## 2024-04-05 VITALS
OXYGEN SATURATION: 98 % | SYSTOLIC BLOOD PRESSURE: 109 MMHG | DIASTOLIC BLOOD PRESSURE: 75 MMHG | BODY MASS INDEX: 32.5 KG/M2 | WEIGHT: 227 LBS | HEART RATE: 83 BPM

## 2024-04-05 DIAGNOSIS — R22.41 LOCALIZED SWELLING OF RIGHT FOOT: ICD-10-CM

## 2024-04-05 DIAGNOSIS — E11.69 TYPE 2 DIABETES MELLITUS WITH OTHER SPECIFIED COMPLICATION, WITH LONG-TERM CURRENT USE OF INSULIN (MULTI): ICD-10-CM

## 2024-04-05 DIAGNOSIS — Z79.4 TYPE 2 DIABETES MELLITUS WITH OTHER SPECIFIED COMPLICATION, WITH LONG-TERM CURRENT USE OF INSULIN (MULTI): ICD-10-CM

## 2024-04-05 DIAGNOSIS — M79.671 RIGHT FOOT PAIN: ICD-10-CM

## 2024-04-05 DIAGNOSIS — R60.0 LOWER EXTREMITY EDEMA: Primary | ICD-10-CM

## 2024-04-05 PROCEDURE — 3051F HG A1C>EQUAL 7.0%<8.0%: CPT | Performed by: STUDENT IN AN ORGANIZED HEALTH CARE EDUCATION/TRAINING PROGRAM

## 2024-04-05 PROCEDURE — G2211 COMPLEX E/M VISIT ADD ON: HCPCS | Performed by: STUDENT IN AN ORGANIZED HEALTH CARE EDUCATION/TRAINING PROGRAM

## 2024-04-05 PROCEDURE — 3074F SYST BP LT 130 MM HG: CPT | Performed by: STUDENT IN AN ORGANIZED HEALTH CARE EDUCATION/TRAINING PROGRAM

## 2024-04-05 PROCEDURE — 99214 OFFICE O/P EST MOD 30 MIN: CPT | Performed by: STUDENT IN AN ORGANIZED HEALTH CARE EDUCATION/TRAINING PROGRAM

## 2024-04-05 PROCEDURE — 3078F DIAST BP <80 MM HG: CPT | Performed by: STUDENT IN AN ORGANIZED HEALTH CARE EDUCATION/TRAINING PROGRAM

## 2024-04-05 NOTE — PROGRESS NOTES
Pj Clarke is a 63 y.o. female seen in Clinic at [facility] [place] by Dr. Sigifredo Webster on 04/06/24 for routine care, as well as for management of the following chronic medical conditions: HFrEF/ICM, CAD, HTN, DLD, T2DM with associated diabetic neuropathy, Obesity, GERD. Patient presents today for follow up visit. Ongoing RLE swelling/edema.    ACUTE CONCERNS:   #RLE Swelling  She was hospitalized in January 2024 after fall with subsequent displaced fracture of distal L hallux and associated cellulitis. She has been following closely with podiatry since hospitalization with overall improvement in clinical status, boot no longer in place, WBAT.   In setting of gait being impacted by this, she developed worsening RLE edema and associated pain. She was seen last month for this, and given recent hospitalization and limitations in mobility, duplex performed to r/o clot which was negative. Due to ongoing issues, she was again seen by Dr. Andersen, with limitations in ROM and coolness of extremity. No concerns for infectious etiology/cellulitis at that time. Given significant CAD history, ongoing profound DLD despite medication, suspicion for PAD for which LEA/PVR ordered (still pending) and vascular medicine referral provided (has previously been seen by Dr. Murray for chronic venous insufficiency). Plain films given unclear possible trauma history performed notable for soft tissue swelling but no acute findings.     Since that visit, things have not necessarily worsened but have also not improved. She continues to have swelling of this extremity to ankle and coolness, though not notably different in comparison to LLE. Chronic skin changes on this ankle as well, though has been present for several year. She is already maintained on DAPT, high intensity statin, Zetia, though last lipid panel not at goal.     Encouraged to get vascular study completed and follow up with Dr. Murray.     She also notes she will be transitioning  primary care to me given Dr. Dixon's pending departure. He chronic medical conditions have been reviewed by me with my thoughts as follows:     CHRONIC MEDICAL CONDITIONS:   #HFrEF, CAD, Cardiomyopathy, HTN, DLD   - follows with cardiology, Dr. Hutchison  - history of NSTEMI (02/2015) and STEMI s/p PCI with JAQUI to LAD (10/2016) with residual ischemic CM (EF 30%) s/p ICD   - current regimen: ASA 81, Brilinta 60mg BID, Crestor 40mg daily, Ezetimibe 10mg at bedtime, Farxiga 10mg, Metoprolol succinate 100mg BID, Entresto 49-51 BID, Furosemide 20mg daily, Spironolactone 12.5mg daily, Potassium 20meq daily  - prior echo 03/2021 with EF 35%, with regional motion wall abnormalities, impaired LV diastolic filling   - last lipid panel 09/2023 NOT at goal (, )  [  ] pending updated echo  [  ] follow up with Dr. Hutchison and repeat lipid panel; had discussed REPATHA, which she deferred   [  ] consider Quinn [facility]   [  ] consider [facility] referral     #T2DM  #Obesity   - last A1C 7.7% in 01/2024  - current regimen: Metformin 1000mg BID, Farxiga 10mg daily, Basaglar 25-30 units (BID), Glimepiride 4mg BID, Mounjaro 5mg weekly (recently uptitrated from 2.5)   [  ] repeat A1C later this month  [  ] follow up in one month   [  ] pharmacy referral: ideally get connected with [facility]   [  ] consider [facility] clinic referral   [  ] ensure following with optho     #Diabetic Neuropathy   - Gabapentin 300mg BID (recently uptitrated)     #Chronic Venous Insufficiency with GSV Reflux  - seen by vascular medicine    #History of Posterior Tibial Tendonitis      #GERD  - Omeprazole 40mg daily     #Lung Nodules  - follows with pulmonary, last 06/2023  - CT chest unchanged at that visit, surgical lung biopsy (VATS 2021) showed necrotizing granulomas on pathology but no fungal organisms (still most likely histoplasmosis)  - Repeat CT chest in 1 year (06/2024) -> if remains unchanged at that time, can consider ceasing routine follow-up  imaging     #MDD  - last medication over 10 years ago  - remote ECT (2002)     Past Medical History: as above   Past Medical History:   Diagnosis Date    Acute ischemic heart disease, unspecified (CMS/HCC)     Acute coronary syndrome    Breast cyst had 1 as a teenager.    CHF (congestive heart failure) (CMS/HCC)     Dermatochalasis of unspecified eye, unspecified eyelid 11/13/2018    Dermatochalasis    Diabetes mellitus (CMS/HCC)     Essential (primary) hypertension 11/16/2022    Benign essential hypertension    Personal history of other mental and behavioral disorders     History of depression    Pure hyperglyceridemia 11/28/2016    Essential hypertriglyceridemia     Subspecialty Medical Care: Cardiology, Vascular Medicine, Podiatry, ENT, Pulmonary     Past Surgical History:   Past Surgical History:   Procedure Laterality Date    BREAST CYST ASPIRATION  when i was a young teenager.    CHOLECYSTECTOMY  06/19/2013    Cholecystectomy    GALLBLADDER SURGERY  05/11/2016    Gallbladder Surgery    HYSTERECTOMY  02/11/2015    Hysterectomy    OTHER SURGICAL HISTORY  06/29/2021    Lung wedge resection    OTHER SURGICAL HISTORY  01/03/2017    Implantable Cardioverter-Defibrillator    TONSILLECTOMY  05/11/2016    Tonsillectomy     Medications:  Current Outpatient Medications:     aspirin 81 mg EC tablet, Take 1 tablet (81 mg) by mouth once daily., Disp: , Rfl:     Basaglar KwikPen U-100 Insulin 100 unit/mL (3 mL) pen, INJECT 35 UNITS UNDER THE SKIN TWO TIMES A DAY., Disp: 21 mL, Rfl: 5    Brilinta 60 mg tablet, Take 1 tablet (60 mg) by mouth 2 times a day., Disp: , Rfl:     Crestor 40 mg tablet, TAKE ONE TABLET BY MOUTH EVERY DAY, Disp: 90 tablet, Rfl: 3    dapagliflozin propanediol (Farxiga) 10 mg, Take 1 tablet (10 mg) by mouth once daily., Disp: 90 tablet, Rfl: 1    Entresto 49-51 mg tablet, Take 1 tablet by mouth 2 times a day., Disp: , Rfl:     ezetimibe (Zetia) 10 mg tablet, Take 1 tablet (10 mg) by mouth once daily at  "bedtime., Disp: , Rfl:     furosemide (Lasix) 20 mg tablet, Take 1 tablet (20 mg) by mouth once daily., Disp: , Rfl:     gabapentin (Neurontin) 300 mg capsule, TAKE ONE CAPSULE BY MOUTH EVERY DAY AT BEDTIME, Disp: 90 capsule, Rfl: 0    glimepiride (Amaryl) 4 mg tablet, Take 1 tablet (4 mg) by mouth 2 times a day., Disp: 60 tablet, Rfl: 0    metFORMIN  mg 24 hr tablet, Take 2 tablets (1,000 mg) by mouth 2 times a day., Disp: 120 tablet, Rfl: 0    metoprolol succinate XL (Toprol-XL) 100 mg 24 hr tablet, Take 1 tablet (100 mg) by mouth every 12 hours., Disp: , Rfl:     omeprazole (PriLOSEC) 40 mg DR capsule, TAKE ONE CAPSULE BY MOUTH EVERY DAY, do not crush or chew, Disp: 90 capsule, Rfl: 0    pen needle, diabetic (BD Teri 2nd Gen Pen Needle) 32 gauge x 5/32\" needle, USE TO INJECT INSULIN TWICE DAILY, Disp: 200 each, Rfl: 0    pen needle, diabetic 31 gauge x 1/4\" needle, Use  as directed, Disp: , Rfl:     potassium chloride CR 20 mEq ER tablet, Take 1 tablet (20 mEq) by mouth once daily., Disp: 90 tablet, Rfl: 2    spironolactone (Aldactone) 25 mg tablet, Take 0.5 tablets (12.5 mg) by mouth once daily., Disp: , Rfl:     tirzepatide (Mounjaro) 5 mg/0.5 mL pen injector, Inject 5 mg under the skin 1 (one) time per week., Disp: 2 mL, Rfl: 0  [place] [facility] [place]     Allergies:   Allergies   Allergen Reactions    Penicillins Hives and Shortness of breath    Atorvastatin Other     severe leg cramps    Metformin Hcl Diarrhea     Only with HCL brand.   Good with ER     Immunizations:   - Flu annually   - COVID booster--recommend staying UTD  - RSV: completed 10/2023  - Tdap 2019-->due 2029  - PCV/PPSV 23: last 2018; discuss PCV-20 at CPE  - Shingrix: recommend     Family History:   Family History   Problem Relation Name Age of Onset    Other (Malignant neoplasm) Mother      Other (Malignant neoplasm) Father      Colon cancer Mother's Sister      Other (Family history of diabetes mellitus) Other       Social " History:   Home/Living Situation/Falls/Safety Assessment: lives at home with , became critically ill during COVID   Education/Employment/Work/Vocational:   Activities:   Drug Use:   Diet: T2DM, CAD  Depression/Anxiety: prior history   Sexuality/Contraception/Menstrual History:    Sleep:     Patient Information:  Health Insurance: has insurance   Transportation:   Healthcare POA/Guardian:    Contact Information:     Visit Vitals  /75   Pulse 83   Wt 103 kg (227 lb)   SpO2 98%   BMI 32.50 kg/m²   OB Status Hysterectomy   Smoking Status Never   BSA 2.26 m²      PHYSICAL EXAM:   General: well appearing  female in NAD  HEENT: NCAT, MMM, large neck circumference, corrective lenses in place  CV: RRR  PULM: CTAB  ABD: soft, obese, NT, ND  EXT: RLE with ankle/calf swelling and larger circumference compared to LLE, 1+ edema, cool to touch (though cool in bilateral lower extremities), no erythema or acute trauma, no active skin breakage (old skin lesion of R medial ankle noted) no significant pain of calf, diminished R DP pulse   NEURO/PSYCH: A&Ox4, gait impacted by swelling, symmetric facies, appropriate mentation     Assessment/Plan    Pj Clarke is a 63 y.o. female seen in Clinic at [facility] [place] by Dr. Sigifredo Webster on 04/06/24 for routine care, as well as for management of the following chronic medical conditions: HFrEF/ICM, CAD, HTN, DLD, T2DM with associated diabetic neuropathy, Obesity, GERD. Patient presents today for follow up visit. Ongoing RLE swelling/edema.    ACUTE CONCERNS:   #RLE Swelling  She was hospitalized in January 2024 after fall with subsequent displaced fracture of distal L hallux and associated cellulitis. She has been following closely with podiatry since hospitalization with overall improvement in clinical status, boot no longer in place, WBAT.   In setting of gait being impacted by this, she developed worsening RLE edema and associated pain. She was seen  last month for this, and given recent hospitalization and limitations in mobility, duplex performed to r/o clot which was negative. Due to ongoing issues, she was again seen by Dr. Andersen, with limitations in ROM and coolness of extremity. No concerns for infectious etiology/cellulitis at that time. Given significant CAD history, ongoing profound DLD despite medication, suspicion for PAD for which LEA/PVR ordered (still pending) and vascular medicine referral provided (has previously been seen by Dr. Murray for chronic venous insufficiency). Plain films given unclear possible trauma history performed notable for soft tissue swelling but no acute findings.     Since that visit, things have not necessarily worsened but have also not improved. She continues to have swelling of this extremity to ankle and coolness, though not notably different in comparison to LLE. Chronic skin changes on this ankle as well, though has been present for several year. She is already maintained on DAPT, high intensity statin, Zetia, though last lipid panel not at goal.     Encouraged to get vascular study completed and follow up with Dr. Murray.     She also notes she will be transitioning primary care to me given Dr. Dixon's pending departure. He chronic medical conditions have been reviewed by me with my thoughts as follows:     CHRONIC MEDICAL CONDITIONS:   #HFrEF, CAD, Cardiomyopathy, HTN, DLD   - follows with cardiology, Dr. Hutchison  - history of NSTEMI (02/2015) and STEMI s/p PCI with JAQUI to LAD (10/2016) with residual ischemic CM (EF 30%) s/p ICD   - current regimen: ASA 81, Brilinta 60mg BID, Crestor 40mg daily, Ezetimibe 10mg at bedtime, Farxiga 10mg, Metoprolol succinate 100mg BID, Entresto 49-51 BID, Furosemide 20mg daily, Spironolactone 12.5mg daily, Potassium 20meq daily  - prior echo 03/2021 with EF 35%, with regional motion wall abnormalities, impaired LV diastolic filling   - last lipid panel 09/2023 NOT at goal (, TC  211)  [  ] pending updated echo  [  ] follow up with Dr. Hutchison and repeat lipid panel; had discussed REPATHA, which she deferred   [  ] consider [facility]   [  ] consider [facility] clinic referral     #T2DM  #Obesity   - last A1C 7.7% in 01/2024  - current regimen: Metformin 1000mg BID, Farxiga 10mg daily, Basaglar 25-30 units (BID), Glimepiride 4mg BID, Mounjaro 5mg weekly (recently uptitrated from 2.5)   [  ] repeat A1C later this month  [  ] follow up in one month   [  ] pharmacy referral: ideally get connected with [facility]   [  ] consider [facility] clinic referral   [  ] ensure following with optho     #Diabetic Neuropathy   - Gabapentin 300mg BID (recently uptitrated)     #Chronic Venous Insufficiency with GSV Reflux  - seen by vascular medicine    #History of Posterior Tibial Tendonitis      #GERD  - Omeprazole 40mg daily     #Lung Nodules  - follows with pulmonary, last 06/2023  - CT chest unchanged at that visit, surgical lung biopsy (VATS 2021) showed necrotizing granulomas on pathology but no fungal organisms (still most likely histoplasmosis)  - Repeat CT chest in 1 year (06/2024) -> if remains unchanged at that time, can consider ceasing routine follow-up imaging     #MDD  - last medication over 10 years ago  - remote ECT (2002)     #Health Maintenance    Cancer Screening  - Cervical Cancer Screening: last pap smear/HPV testing: due 2024   - Mammography: last 12/2023 reassuring   - Colorectal Cancer Screening: last 2015; overdue but has deferred per [facility] note; discuss   - Lung Cancer Screening:     Laboratory Screening  - Lipid Screen: CAD, on statin, not at goal   - ASCVD Score: CAD   - A1C, glucose screen: 7.7% in 01/2024  - STI, HIV, Hep B screen:   - Hep C screen:     Imaging Screening  - Osteoporosis/DEXA screening: due at 65     Immunizations:   - Flu annually   - COVID booster--recommend staying UTD  - RSV: completed 10/2023  - Tdap 2019-->due 2029  - PCV/PPSV 23: last 2018; discuss PCV-20 at CPE  -  Shingrix: recommend     Other Screening  - Health Literacy Assessment: excellent   - Depression screen: remote history   - Home safety/partner violence screen:   - Hearing/Vision screens: corrective lenses, T2DM, follow with optho  - Alcohol/tobacco/drug use screen:   - Healthcare POA/Advanced Directives:      Referrals: vascular studies, follow up with vascular medicine, pharmacy referral, continue GLP-1 and titrate    Return to clinic in 4-6 weeks or follow-up.    Patient Discussion:    Please call back the office with any questions at [phone]. In the case of an emergency, please call 911 or go to the nearest Emergency Department.      Sigifredo Webster MD  Internal Medicine-Pediatrics  [facility] [place] [address], [address]  P: [phone], F: [phone]

## 2024-04-10 DIAGNOSIS — Z12.11 COLON CANCER SCREENING: Primary | ICD-10-CM

## 2024-04-12 ENCOUNTER — APPOINTMENT (OUTPATIENT)
Dept: PRIMARY CARE | Facility: CLINIC | Age: 64
End: 2024-04-12
Payer: COMMERCIAL

## 2024-04-12 ENCOUNTER — TELEPHONE (OUTPATIENT)
Dept: CARDIOLOGY | Facility: HOSPITAL | Age: 64
End: 2024-04-12

## 2024-04-12 DIAGNOSIS — E78.00 PURE HYPERCHOLESTEROLEMIA: ICD-10-CM

## 2024-04-12 DIAGNOSIS — I25.10 CAD IN NATIVE ARTERY: ICD-10-CM

## 2024-04-12 RX ORDER — ROSUVASTATIN CALCIUM 40 MG/1
40 TABLET, FILM COATED ORAL DAILY
Qty: 90 TABLET | Refills: 3 | Status: SHIPPED | OUTPATIENT
Start: 2024-04-12

## 2024-04-12 NOTE — TELEPHONE ENCOUNTER
"\"Med clean up\" from primary sent a cancellation of her crestor to her pharmacy.     Can we resend to [facility] [place]? Please and thank you.  "

## 2024-04-15 ENCOUNTER — TELEPHONE (OUTPATIENT)
Dept: CARDIOLOGY | Facility: HOSPITAL | Age: 64
End: 2024-04-15
Payer: COMMERCIAL

## 2024-04-15 DIAGNOSIS — E11.69 TYPE 2 DIABETES MELLITUS WITH OTHER SPECIFIED COMPLICATION, WITH LONG-TERM CURRENT USE OF INSULIN (MULTI): ICD-10-CM

## 2024-04-15 DIAGNOSIS — Z79.4 TYPE 2 DIABETES MELLITUS WITH OTHER SPECIFIED COMPLICATION, WITH LONG-TERM CURRENT USE OF INSULIN (MULTI): ICD-10-CM

## 2024-04-15 RX ORDER — GLIMEPIRIDE 4 MG/1
4 TABLET ORAL 2 TIMES DAILY
Qty: 180 TABLET | Refills: 1 | Status: SHIPPED | OUTPATIENT
Start: 2024-04-15

## 2024-04-15 RX ORDER — METFORMIN HYDROCHLORIDE 500 MG/1
1000 TABLET, EXTENDED RELEASE ORAL 2 TIMES DAILY
Qty: 360 TABLET | Refills: 1 | Status: SHIPPED | OUTPATIENT
Start: 2024-04-15

## 2024-04-15 NOTE — TELEPHONE ENCOUNTER
Patient called and left a message stating she needs a letter? stating that she has to be on the name brand crestor medication due to not being able to tolerate the generic. She said she has been this way since 2015. Not sure if that's something to do with a prior auth or if its an actual letter she is requesting.

## 2024-04-17 ENCOUNTER — TELEPHONE (OUTPATIENT)
Dept: CARDIOLOGY | Facility: HOSPITAL | Age: 64
End: 2024-04-17
Payer: COMMERCIAL

## 2024-04-17 ENCOUNTER — ANCILLARY PROCEDURE (OUTPATIENT)
Dept: VASCULAR MEDICINE | Facility: CLINIC | Age: 64
End: 2024-04-17
Payer: COMMERCIAL

## 2024-04-17 DIAGNOSIS — R22.41 LOCALIZED SWELLING OF RIGHT FOOT: ICD-10-CM

## 2024-04-17 DIAGNOSIS — M79.671 RIGHT FOOT PAIN: ICD-10-CM

## 2024-04-17 DIAGNOSIS — R09.89 WEAK ARTERIAL PULSE: ICD-10-CM

## 2024-04-17 PROCEDURE — 93922 UPR/L XTREMITY ART 2 LEVELS: CPT | Performed by: INTERNAL MEDICINE

## 2024-04-17 PROCEDURE — 93922 UPR/L XTREMITY ART 2 LEVELS: CPT

## 2024-04-17 NOTE — TELEPHONE ENCOUNTER
She is unable to tolerate generic rosuvastatin. She's tried rosuvastatin in the past and experienced myalgias/muscle cramps. Brand name Crestor is being requested due to a formulation difference in the inactive ingredient between the brand and generic product. She's been taking brand name Crestor since 2017 and is able to tolerate it.

## 2024-04-18 DIAGNOSIS — Z79.4 TYPE 2 DIABETES MELLITUS WITH OTHER SPECIFIED COMPLICATION, WITH LONG-TERM CURRENT USE OF INSULIN (MULTI): Primary | ICD-10-CM

## 2024-04-18 DIAGNOSIS — E11.69 TYPE 2 DIABETES MELLITUS WITH OTHER SPECIFIED COMPLICATION, WITH LONG-TERM CURRENT USE OF INSULIN (MULTI): Primary | ICD-10-CM

## 2024-04-18 RX ORDER — TIRZEPATIDE 7.5 MG/.5ML
7.5 INJECTION, SOLUTION SUBCUTANEOUS
Qty: 2 ML | Refills: 0 | Status: SHIPPED | OUTPATIENT
Start: 2024-04-21 | End: 2024-04-22 | Stop reason: ALTCHOICE

## 2024-04-22 DIAGNOSIS — E11.69 TYPE 2 DIABETES MELLITUS WITH OTHER SPECIFIED COMPLICATION, WITH LONG-TERM CURRENT USE OF INSULIN (MULTI): Primary | ICD-10-CM

## 2024-04-22 DIAGNOSIS — Z79.4 TYPE 2 DIABETES MELLITUS WITH OTHER SPECIFIED COMPLICATION, WITH LONG-TERM CURRENT USE OF INSULIN (MULTI): Primary | ICD-10-CM

## 2024-04-22 RX ORDER — TIRZEPATIDE 5 MG/.5ML
5 INJECTION, SOLUTION SUBCUTANEOUS
Qty: 2 ML | Refills: 0 | Status: SHIPPED | OUTPATIENT
Start: 2024-04-28 | End: 2024-05-14 | Stop reason: ALTCHOICE

## 2024-04-23 ENCOUNTER — APPOINTMENT (OUTPATIENT)
Dept: PRIMARY CARE | Facility: CLINIC | Age: 64
End: 2024-04-23
Payer: COMMERCIAL

## 2024-04-23 ENCOUNTER — PHARMACY VISIT (OUTPATIENT)
Dept: PHARMACY | Facility: CLINIC | Age: 64
End: 2024-04-23
Payer: COMMERCIAL

## 2024-04-23 PROCEDURE — RXMED WILLOW AMBULATORY MEDICATION CHARGE

## 2024-05-14 DIAGNOSIS — Z79.4 TYPE 2 DIABETES MELLITUS WITH OTHER SPECIFIED COMPLICATION, WITH LONG-TERM CURRENT USE OF INSULIN (MULTI): Primary | ICD-10-CM

## 2024-05-14 DIAGNOSIS — E11.69 TYPE 2 DIABETES MELLITUS WITH OTHER SPECIFIED COMPLICATION, WITH LONG-TERM CURRENT USE OF INSULIN (MULTI): Primary | ICD-10-CM

## 2024-05-14 DIAGNOSIS — I25.5 CARDIOMYOPATHY, ISCHEMIC: Primary | ICD-10-CM

## 2024-05-14 PROCEDURE — RXMED WILLOW AMBULATORY MEDICATION CHARGE

## 2024-05-14 RX ORDER — TIRZEPATIDE 7.5 MG/.5ML
7.5 INJECTION, SOLUTION SUBCUTANEOUS
Qty: 2 ML | Refills: 0 | Status: SHIPPED | OUTPATIENT
Start: 2024-05-19

## 2024-05-14 RX ORDER — SACUBITRIL AND VALSARTAN 49; 51 MG/1; MG/1
1 TABLET, FILM COATED ORAL 2 TIMES DAILY
Qty: 180 TABLET | Refills: 3 | Status: SHIPPED | OUTPATIENT
Start: 2024-05-14 | End: 2025-05-14

## 2024-05-15 ENCOUNTER — PHARMACY VISIT (OUTPATIENT)
Dept: PHARMACY | Facility: CLINIC | Age: 64
End: 2024-05-15
Payer: COMMERCIAL

## 2024-05-21 DIAGNOSIS — E11.9 TYPE 2 DIABETES MELLITUS WITHOUT COMPLICATION, WITH LONG-TERM CURRENT USE OF INSULIN (MULTI): ICD-10-CM

## 2024-05-21 DIAGNOSIS — Z79.4 TYPE 2 DIABETES MELLITUS WITHOUT COMPLICATION, WITH LONG-TERM CURRENT USE OF INSULIN (MULTI): ICD-10-CM

## 2024-05-21 RX ORDER — INSULIN GLARGINE 100 [IU]/ML
INJECTION, SOLUTION SUBCUTANEOUS
Qty: 21 ML | Refills: 5 | Status: SHIPPED | OUTPATIENT
Start: 2024-05-21

## 2024-05-22 DIAGNOSIS — I10 BENIGN ESSENTIAL HYPERTENSION: Primary | ICD-10-CM

## 2024-05-22 RX ORDER — METOPROLOL SUCCINATE 100 MG/1
100 TABLET, EXTENDED RELEASE ORAL EVERY 12 HOURS
Qty: 180 TABLET | Refills: 3 | Status: SHIPPED | OUTPATIENT
Start: 2024-05-22

## 2024-05-27 DIAGNOSIS — E11.59 TYPE 2 DIABETES MELLITUS WITH OTHER CIRCULATORY COMPLICATION, WITH LONG-TERM CURRENT USE OF INSULIN (MULTI): ICD-10-CM

## 2024-05-27 DIAGNOSIS — Z79.4 TYPE 2 DIABETES MELLITUS WITH OTHER CIRCULATORY COMPLICATION, WITH LONG-TERM CURRENT USE OF INSULIN (MULTI): ICD-10-CM

## 2024-05-27 RX ORDER — PEN NEEDLE, DIABETIC 32GX 5/32"
NEEDLE, DISPOSABLE MISCELLANEOUS
Qty: 100 EACH | Refills: 1 | Status: SHIPPED | OUTPATIENT
Start: 2024-05-27

## 2024-05-28 ENCOUNTER — HOSPITAL ENCOUNTER (OUTPATIENT)
Dept: CARDIOLOGY | Facility: CLINIC | Age: 64
Discharge: HOME | End: 2024-05-28
Payer: COMMERCIAL

## 2024-05-28 DIAGNOSIS — I47.20 VT (VENTRICULAR TACHYCARDIA) (MULTI): ICD-10-CM

## 2024-05-30 ENCOUNTER — HOSPITAL ENCOUNTER (OUTPATIENT)
Dept: RADIOLOGY | Facility: CLINIC | Age: 64
Discharge: HOME | End: 2024-05-30
Payer: COMMERCIAL

## 2024-05-30 ENCOUNTER — OFFICE VISIT (OUTPATIENT)
Dept: PRIMARY CARE | Facility: CLINIC | Age: 64
End: 2024-05-30
Payer: COMMERCIAL

## 2024-05-30 ENCOUNTER — LAB (OUTPATIENT)
Dept: LAB | Facility: LAB | Age: 64
End: 2024-05-30
Payer: COMMERCIAL

## 2024-05-30 VITALS
SYSTOLIC BLOOD PRESSURE: 111 MMHG | DIASTOLIC BLOOD PRESSURE: 69 MMHG | OXYGEN SATURATION: 96 % | HEART RATE: 94 BPM | BODY MASS INDEX: 32.14 KG/M2 | WEIGHT: 224.5 LBS

## 2024-05-30 DIAGNOSIS — M79.89 SWELLING OF RIGHT LOWER EXTREMITY: Primary | ICD-10-CM

## 2024-05-30 DIAGNOSIS — I50.22 CHRONIC SYSTOLIC CONGESTIVE HEART FAILURE (MULTI): ICD-10-CM

## 2024-05-30 DIAGNOSIS — M25.522 LEFT ELBOW PAIN: ICD-10-CM

## 2024-05-30 DIAGNOSIS — E11.69 TYPE 2 DIABETES MELLITUS WITH OTHER SPECIFIED COMPLICATION, WITH LONG-TERM CURRENT USE OF INSULIN (MULTI): ICD-10-CM

## 2024-05-30 DIAGNOSIS — E11.3213 TYPE 2 DIABETES MELLITUS WITH BOTH EYES AFFECTED BY MILD NONPROLIFERATIVE RETINOPATHY AND MACULAR EDEMA, WITH LONG-TERM CURRENT USE OF INSULIN (MULTI): ICD-10-CM

## 2024-05-30 DIAGNOSIS — I25.10 CAD IN NATIVE ARTERY: ICD-10-CM

## 2024-05-30 DIAGNOSIS — E55.9 MILD VITAMIN D DEFICIENCY: ICD-10-CM

## 2024-05-30 DIAGNOSIS — Z79.4 TYPE 2 DIABETES MELLITUS WITH BOTH EYES AFFECTED BY MILD NONPROLIFERATIVE RETINOPATHY AND MACULAR EDEMA, WITH LONG-TERM CURRENT USE OF INSULIN (MULTI): ICD-10-CM

## 2024-05-30 DIAGNOSIS — I25.5 CARDIOMYOPATHY, ISCHEMIC: ICD-10-CM

## 2024-05-30 DIAGNOSIS — L03.119 CELLULITIS OF FOOT: ICD-10-CM

## 2024-05-30 DIAGNOSIS — Z79.4 TYPE 2 DIABETES MELLITUS WITH OTHER SPECIFIED COMPLICATION, WITH LONG-TERM CURRENT USE OF INSULIN (MULTI): ICD-10-CM

## 2024-05-30 DIAGNOSIS — I10 BENIGN ESSENTIAL HYPERTENSION: ICD-10-CM

## 2024-05-30 LAB
25(OH)D3 SERPL-MCNC: 8 NG/ML (ref 30–100)
ALBUMIN SERPL BCP-MCNC: 4.5 G/DL (ref 3.4–5)
ALP SERPL-CCNC: 68 U/L (ref 33–136)
ALT SERPL W P-5'-P-CCNC: 15 U/L (ref 7–45)
ANION GAP SERPL CALC-SCNC: 18 MMOL/L (ref 10–20)
AST SERPL W P-5'-P-CCNC: 13 U/L (ref 9–39)
BASOPHILS # BLD AUTO: 0.04 X10*3/UL (ref 0–0.1)
BASOPHILS NFR BLD AUTO: 0.7 %
BILIRUB SERPL-MCNC: 0.6 MG/DL (ref 0–1.2)
BUN SERPL-MCNC: 28 MG/DL (ref 6–23)
CALCIUM SERPL-MCNC: 10.3 MG/DL (ref 8.6–10.6)
CHLORIDE SERPL-SCNC: 109 MMOL/L (ref 98–107)
CHOLEST SERPL-MCNC: 88 MG/DL (ref 0–199)
CHOLESTEROL/HDL RATIO: 2.6
CO2 SERPL-SCNC: 19 MMOL/L (ref 21–32)
CREAT SERPL-MCNC: 1.08 MG/DL (ref 0.5–1.05)
EGFRCR SERPLBLD CKD-EPI 2021: 57 ML/MIN/1.73M*2
EOSINOPHIL # BLD AUTO: 0.03 X10*3/UL (ref 0–0.7)
EOSINOPHIL NFR BLD AUTO: 0.5 %
ERYTHROCYTE [DISTWIDTH] IN BLOOD BY AUTOMATED COUNT: 14.8 % (ref 11.5–14.5)
EST. AVERAGE GLUCOSE BLD GHB EST-MCNC: 163 MG/DL
GLUCOSE SERPL-MCNC: 141 MG/DL (ref 74–99)
HBA1C MFR BLD: 7.3 %
HCT VFR BLD AUTO: 43.6 % (ref 36–46)
HDLC SERPL-MCNC: 33.9 MG/DL
HGB BLD-MCNC: 14.6 G/DL (ref 12–16)
IMM GRANULOCYTES # BLD AUTO: 0.02 X10*3/UL (ref 0–0.7)
IMM GRANULOCYTES NFR BLD AUTO: 0.4 % (ref 0–0.9)
LDLC SERPL CALC-MCNC: 10 MG/DL
LYMPHOCYTES # BLD AUTO: 1.29 X10*3/UL (ref 1.2–4.8)
LYMPHOCYTES NFR BLD AUTO: 23.6 %
MCH RBC QN AUTO: 30.5 PG (ref 26–34)
MCHC RBC AUTO-ENTMCNC: 33.5 G/DL (ref 32–36)
MCV RBC AUTO: 91 FL (ref 80–100)
MONOCYTES # BLD AUTO: 0.48 X10*3/UL (ref 0.1–1)
MONOCYTES NFR BLD AUTO: 8.8 %
NEUTROPHILS # BLD AUTO: 3.61 X10*3/UL (ref 1.2–7.7)
NEUTROPHILS NFR BLD AUTO: 66 %
NON HDL CHOLESTEROL: 54 MG/DL (ref 0–149)
NRBC BLD-RTO: 0 /100 WBCS (ref 0–0)
PLATELET # BLD AUTO: 257 X10*3/UL (ref 150–450)
POTASSIUM SERPL-SCNC: 4.6 MMOL/L (ref 3.5–5.3)
PROT SERPL-MCNC: 6.6 G/DL (ref 6.4–8.2)
RBC # BLD AUTO: 4.79 X10*6/UL (ref 4–5.2)
SODIUM SERPL-SCNC: 141 MMOL/L (ref 136–145)
TRIGL SERPL-MCNC: 223 MG/DL (ref 0–149)
TSH SERPL-ACNC: 1.44 MIU/L (ref 0.44–3.98)
VLDL: 45 MG/DL (ref 0–40)
WBC # BLD AUTO: 5.5 X10*3/UL (ref 4.4–11.3)

## 2024-05-30 PROCEDURE — 82306 VITAMIN D 25 HYDROXY: CPT

## 2024-05-30 PROCEDURE — 84443 ASSAY THYROID STIM HORMONE: CPT

## 2024-05-30 PROCEDURE — 36415 COLL VENOUS BLD VENIPUNCTURE: CPT

## 2024-05-30 PROCEDURE — 73080 X-RAY EXAM OF ELBOW: CPT | Mod: LT

## 2024-05-30 PROCEDURE — 80053 COMPREHEN METABOLIC PANEL: CPT

## 2024-05-30 PROCEDURE — 73080 X-RAY EXAM OF ELBOW: CPT | Mod: LEFT SIDE | Performed by: RADIOLOGY

## 2024-05-30 PROCEDURE — 3048F LDL-C <100 MG/DL: CPT | Performed by: STUDENT IN AN ORGANIZED HEALTH CARE EDUCATION/TRAINING PROGRAM

## 2024-05-30 PROCEDURE — 3051F HG A1C>EQUAL 7.0%<8.0%: CPT | Performed by: STUDENT IN AN ORGANIZED HEALTH CARE EDUCATION/TRAINING PROGRAM

## 2024-05-30 PROCEDURE — 83036 HEMOGLOBIN GLYCOSYLATED A1C: CPT

## 2024-05-30 PROCEDURE — 3078F DIAST BP <80 MM HG: CPT | Performed by: STUDENT IN AN ORGANIZED HEALTH CARE EDUCATION/TRAINING PROGRAM

## 2024-05-30 PROCEDURE — 80061 LIPID PANEL: CPT

## 2024-05-30 PROCEDURE — 85025 COMPLETE CBC W/AUTO DIFF WBC: CPT

## 2024-05-30 PROCEDURE — G2211 COMPLEX E/M VISIT ADD ON: HCPCS | Performed by: STUDENT IN AN ORGANIZED HEALTH CARE EDUCATION/TRAINING PROGRAM

## 2024-05-30 PROCEDURE — 3074F SYST BP LT 130 MM HG: CPT | Performed by: STUDENT IN AN ORGANIZED HEALTH CARE EDUCATION/TRAINING PROGRAM

## 2024-05-30 PROCEDURE — 3008F BODY MASS INDEX DOCD: CPT | Performed by: STUDENT IN AN ORGANIZED HEALTH CARE EDUCATION/TRAINING PROGRAM

## 2024-05-30 PROCEDURE — 99214 OFFICE O/P EST MOD 30 MIN: CPT | Performed by: STUDENT IN AN ORGANIZED HEALTH CARE EDUCATION/TRAINING PROGRAM

## 2024-05-30 NOTE — PROGRESS NOTES
Pj Clarke is a 63 y.o. female seen in Clinic at [facility] [place] by Dr. Sigifredo Webster on 05/30/24 for routine care, as well as for management of the following chronic medical conditions: HFrEF/ICM, CAD, HTN, DLD, T2DM with associated diabetic neuropathy, Obesity, GERD. Patient presents today for follow up visit.     ACUTE CONCERNS:   L elbow discomfort, ongoing   [  ] plain films   Severe cramping in calves   R lower extremity swelling in foot/toes   40cm mid-calf (RLE)   38cm mid-calf (LLE)   [  ] duplex, include pelvic imaging to assess for any possible compression of venous return   [  ] due for updated fasting labs   [  ] discussion regarding hold and reduction parameters for insulin given ongoing improved glycemic control with titration of Tirzepatide     CHRONIC MEDICAL CONDITIONS:   #RLE Swelling  - She was hospitalized in January 2024 after fall with subsequent displaced fracture of distal L hallux and associated cellulitis. She previously was following closely with podiatry after hospitalization with overall improvement in clinical status, boot no longer in place, WBAT.   - In setting of gait being impacted by this, she developed worsening RLE edema and associated pain. This is her third visit for similar concern this Spring. Prior duplex performed to r/o clot which was negative. To evaluate for PAD, LEA/PVR also ordered with NORMAL results.   - Vascular medicine referral provided (has previously been seen by Dr. Murray for chronic venous insufficiency), has not pursued   [  ] updated duplex and inclusion of pelvis to assess for any possible venous compression occurring higher up     #HFrEF, CAD, Cardiomyopathy, HTN, DLD   - follows with cardiology, Dr. Hutchison  - history of NSTEMI (02/2015) and STEMI s/p PCI with JAQUI to LAD (10/2016) with residual ischemic CM (EF 30%) s/p ICD   - current regimen: ASA 81, Brilinta 60mg BID, Crestor 40mg daily, Ezetimibe 10mg at bedtime, Farxiga 10mg, Metoprolol succinate  100mg BID, Entresto 49-51 BID, Furosemide 20mg daily, Spironolactone 12.5mg daily, Potassium 20meq daily  - prior echo 03/2021 with EF 35%, with regional motion wall abnormalities, impaired LV diastolic filling   - last lipid panel 09/2023 NOT at goal (, )  [  ] pending updated echo 6/13/2024   [  ] follow up with Dr. Hutchison and repeat lipid panel; had discussed REPATHA, which she deferred   [  ] consider Quinn Savage   [  ] consider [facility] clinic referral     #T2DM  #Obesity   - last A1C 7.7% in 01/2024  - current regimen: Metformin 1000mg BID, Farxiga 10mg daily, Basaglar 25-30 units (BID), Glimepiride 4mg BID, Mounjaro 7.5mg weekly   [  ] repeat A1C: improved to 7.3%  [  ] pharmacy referral  [  ] ensure following with optho   [  ] ongoing titration of Tirzepatide to 10mg dosing     #Diabetic Neuropathy   - Gabapentin 300mg BID (recently uptitrated)     #Chronic Venous Insufficiency with GSV Reflux  - previously seen by vascular medicine    #History of Posterior Tibial Tendonitis      #GERD  - Omeprazole 40mg daily     #Lung Nodules  - follows with pulmonary, last 06/2023  - CT chest unchanged at that visit, surgical lung biopsy (VATS 2021) showed necrotizing granulomas on pathology but no fungal organisms (still most likely histoplasmosis)  - Repeat CT chest in 1 year (06/2024) -> if remains unchanged at that time, can consider ceasing routine follow-up imaging     #MDD  - last medication over 10 years ago  - remote ECT (2002)     Past Medical History: as above   Past Medical History:   Diagnosis Date    Acute ischemic heart disease, unspecified (Multi)     Acute coronary syndrome    Breast cyst had 1 as a teenager.    CHF (congestive heart failure) (Multi)     Dermatochalasis of unspecified eye, unspecified eyelid 11/13/2018    Dermatochalasis    Diabetes mellitus (Multi)     Essential (primary) hypertension 11/16/2022    Benign essential hypertension    Personal history of other mental and  behavioral disorders     History of depression    Pure hyperglyceridemia 11/28/2016    Essential hypertriglyceridemia     Subspecialty Medical Care: Cardiology, Vascular Medicine, Podiatry, ENT, Pulmonary     Past Surgical History:   Past Surgical History:   Procedure Laterality Date    BREAST CYST ASPIRATION  when i was a young teenager.    CHOLECYSTECTOMY  06/19/2013    Cholecystectomy    GALLBLADDER SURGERY  05/11/2016    Gallbladder Surgery    HYSTERECTOMY  02/11/2015    Hysterectomy    OTHER SURGICAL HISTORY  06/29/2021    Lung wedge resection    OTHER SURGICAL HISTORY  01/03/2017    Implantable Cardioverter-Defibrillator    TONSILLECTOMY  05/11/2016    Tonsillectomy     Medications:  Current Outpatient Medications:     aspirin 81 mg EC tablet, Take 1 tablet (81 mg) by mouth once daily., Disp: , Rfl:     Brilinta 60 mg tablet, TAKE ONE TABLET BY MOUTH TWO TIMES A DAY, Disp: 180 tablet, Rfl: 3    Crestor 40 mg tablet, Take 1 tablet (40 mg) by mouth once daily., Disp: 90 tablet, Rfl: 3    dapagliflozin propanediol (Farxiga) 10 mg, Take 1 tablet (10 mg) by mouth once daily., Disp: 90 tablet, Rfl: 1    ergocalciferol (Vitamin D-2) 1.25 MG (88693 UT) capsule, Take 1 capsule (50,000 Units) by mouth 1 (one) time per week., Disp: 8 capsule, Rfl: 0    ezetimibe (Zetia) 10 mg tablet, TAKE ONE TABLET BY MOUTH AT BEDTIME, Disp: 90 tablet, Rfl: 3    furosemide (Lasix) 20 mg tablet, Take 1 tablet (20 mg) by mouth once daily., Disp: , Rfl:     gabapentin (Neurontin) 300 mg capsule, Take 1 capsule (300 mg) by mouth once daily at bedtime. Take at bedtime., Disp: 90 capsule, Rfl: 2    glimepiride (Amaryl) 4 mg tablet, Take 1 tablet (4 mg) by mouth 2 times a day., Disp: 180 tablet, Rfl: 1    insulin glargine (Basaglar KwikPen U-100 Insulin) 100 unit/mL (3 mL) pen, INJECT 30 UNITS UNDER THE SKIN TWO TIMES A DAY. (Patient taking differently: INJECT 25 UNITS UNDER THE SKIN TWO TIMES A DAY.), Disp: 21 mL, Rfl: 5    metFORMIN   "mg 24 hr tablet, Take 2 tablets (1,000 mg) by mouth 2 times a day., Disp: 360 tablet, Rfl: 1    metoprolol succinate XL (Toprol-XL) 100 mg 24 hr tablet, TAKE ONE TABLET BY MOUTH EVERY 12 HOURS, Disp: 180 tablet, Rfl: 3    omeprazole (PriLOSEC) 40 mg DR capsule, TAKE ONE CAPSULE BY MOUTH EVERY DAY, do not crush or chew, Disp: 90 capsule, Rfl: 0    pen needle, diabetic (BD Teri 2nd Gen Pen Needle) 32 gauge x 5/32\" needle, USE TO INJECT INSULIN TWICE DAILY, Disp: 100 each, Rfl: 1    pen needle, diabetic 31 gauge x 1/4\" needle, Use  as directed, Disp: , Rfl:     potassium chloride CR 20 mEq ER tablet, Take 1 tablet (20 mEq) by mouth once daily., Disp: 90 tablet, Rfl: 2    sacubitriL-valsartan (Entresto) 49-51 mg tablet, Take 1 tablet by mouth 2 times a day., Disp: 180 tablet, Rfl: 3    spironolactone (Aldactone) 25 mg tablet, Take 0.5 tablets (12.5 mg) by mouth once daily., Disp: , Rfl:     [START ON 6/16/2024] tirzepatide (Mounjaro) 10 mg/0.5 mL pen injector, Inject 10 mg under the skin 1 (one) time per week., Disp: 2 mL, Rfl: 0  Pharmacy: [facility])     Allergies:   Allergies   Allergen Reactions    Penicillins Hives and Shortness of breath    Atorvastatin Other     severe leg cramps    Metformin Hcl Diarrhea     Only with HCL brand.   Good with ER     Immunizations:   - Flu annually   - COVID booster--recommend staying UTD  - RSV: completed 10/2023  - Tdap 2019-->due 2029  - PCV/PPSV 23: last 2018; discuss PCV-20 at CPE  - Shingrix: recommend     Family History:   Family History   Problem Relation Name Age of Onset    Other (Malignant neoplasm) Mother      Other (Malignant neoplasm) Father      Colon cancer Mother's Sister      Other (Family history of diabetes mellitus) Other       Social History:   Home/Living Situation/Falls/Safety Assessment: lives at home with , became critically ill during COVID   Education/Employment/Work/Vocational:   Activities:   Drug Use:   Diet: T2DM, " CAD  Depression/Anxiety: prior history   Sexuality/Contraception/Menstrual History:    Sleep:     Patient Information:  Health Insurance: has insurance   Transportation:   Healthcare POA/Guardian:    Contact Information:     Visit Vitals  /69   Pulse 94   Wt 102 kg (224 lb 8 oz)   SpO2 96%   BMI 32.14 kg/m²   OB Status Hysterectomy   Smoking Status Never   BSA 2.25 m²      PHYSICAL EXAM:   General: well appearing  female in NAD  HEENT: NCAT, MMM, large neck circumference, corrective lenses in place  CV: RRR  PULM: CTAB  ABD: soft, obese, NT, ND  EXT: RLE with ankle/calf swelling and larger circumference compared to LLE, 1+ edema, cool to touch (though cool in bilateral lower extremities), no erythema or acute trauma, no active skin breakage (old skin lesion of R medial ankle noted) no significant pain of calf, diminished R DP pulse   NEURO/PSYCH: A&Ox4, gait impacted by swelling, symmetric facies, appropriate mentation     Assessment/Plan    Pj Clarke is a 63 y.o. female seen in Clinic at [facility] [place] by [place]. Sigifredo Webster on 05/30/24 for routine care, as well as for management of the following chronic medical conditions: HFrEF/ICM, CAD, HTN, DLD, T2DM with associated diabetic neuropathy, Obesity, GERD. Patient presents today for follow up visit.     ACUTE CONCERNS:   L elbow discomfort, ongoing   [  ] plain films   Severe cramping in calves   R lower extremity swelling in foot/toes   40cm mid-calf (RLE)   38cm mid-calf (LLE)   [  ] duplex, include pelvic imaging to assess for any possible compression of venous return   [  ] due for updated fasting labs   [  ] discussion regarding hold and reduction parameters for insulin given ongoing improved glycemic control with titration of Tirzepatide     CHRONIC MEDICAL CONDITIONS:   #RLE Swelling  - She was hospitalized in January 2024 after fall with subsequent displaced fracture of distal L hallux and associated cellulitis. She previously  was following closely with podiatry after hospitalization with overall improvement in clinical status, boot no longer in place, WBAT.   - In setting of gait being impacted by this, she developed worsening RLE edema and associated pain. This is her third visit for similar concern this Spring. Prior duplex performed to r/o clot which was negative. To evaluate for PAD, LEA/PVR also ordered with NORMAL results.   - Vascular medicine referral provided (has previously been seen by Dr. Murray for chronic venous insufficiency), has not pursued   [  ] updated duplex and inclusion of pelvis to assess for any possible venous compression occurring higher up     #HFrEF, CAD, Cardiomyopathy, HTN, DLD   - follows with cardiology, Dr. Hutchison  - history of NSTEMI (02/2015) and STEMI s/p PCI with JAQUI to LAD (10/2016) with residual ischemic CM (EF 30%) s/p ICD   - current regimen: ASA 81, Brilinta 60mg BID, Crestor 40mg daily, Ezetimibe 10mg at bedtime, Farxiga 10mg, Metoprolol succinate 100mg BID, Entresto 49-51 BID, Furosemide 20mg daily, Spironolactone 12.5mg daily, Potassium 20meq daily  - prior echo 03/2021 with EF 35%, with regional motion wall abnormalities, impaired LV diastolic filling   - last lipid panel 09/2023 NOT at goal (, )  [  ] pending updated echo 6/13/2024   [  ] follow up with Dr. Hutchison and repeat lipid panel; had discussed REPATHA, which she deferred   [  ] consider Quinn Savage   [  ] consider [facility] clinic referral     #T2DM  #Obesity   - last A1C 7.7% in 01/2024  - current regimen: Metformin 1000mg BID, Farxiga 10mg daily, Basaglar 25-30 units (BID), Glimepiride 4mg BID, Mounjaro 7.5mg weekly   [  ] repeat A1C: improved to 7.3%  [  ] pharmacy referral  [  ] ensure following with optho   [  ] ongoing titration of Tirzepatide to 10mg dosing     #Diabetic Neuropathy   - Gabapentin 300mg BID (recently uptitrated)     #Chronic Venous Insufficiency with GSV Reflux  - previously seen by vascular  medicine    #History of Posterior Tibial Tendonitis      #GERD  - Omeprazole 40mg daily     #Lung Nodules  - follows with pulmonary, last 06/2023  - CT chest unchanged at that visit, surgical lung biopsy (VATS 2021) showed necrotizing granulomas on pathology but no fungal organisms (still most likely histoplasmosis)  - Repeat CT chest in 1 year (06/2024) -> if remains unchanged at that time, can consider ceasing routine follow-up imaging     #MDD  - last medication over 10 years ago  - remote ECT (2002)     #Health Maintenance    Cancer Screening  - Cervical Cancer Screening: last pap smear/HPV testing: due 2024   - Mammography: last 12/2023 reassuring   - Colorectal Cancer Screening: last 2015; overdue but has deferred per Destiny note; discuss   - Lung Cancer Screening:     Laboratory Screening  - Lipid Screen: CAD, on statin, not at goal   - ASCVD Score: CAD   - A1C, glucose screen: 7.3% in 05/2024   - STI, HIV, Hep B screen:   - Hep C screen:     Imaging Screening  - Osteoporosis/DEXA screening: due at 65     Immunizations:   - Flu annually   - COVID booster--recommend staying UTD  - RSV: completed 10/2023  - Tdap 2019-->due 2029  - PCV/PPSV 23: last 2018; discuss PCV-20 at CPE  - Shingrix: recommend     Other Screening  - Health Literacy Assessment: excellent   - Depression screen: remote history   - Home safety/partner violence screen: negative   - Hearing/Vision screens: corrective lenses, T2DM, follow with optho  - Alcohol/tobacco/drug use screen:   - Healthcare POA/Advanced Directives:      Referrals: duplex, plain films, labs, titration of GLP-1     Return to clinic in 6 weeks or follow-up.    Patient Discussion:    Please call back the office with any questions at [phone]. In the case of an emergency, please call 911 or go to the nearest Emergency Department.      Sigifredo Webster MD  Internal Medicine-Pediatrics  [facility] [address], [address]  P: [phone], F: [phone]

## 2024-05-30 NOTE — PATIENT INSTRUCTIONS
Ultrasound and X-Ray   [address]  [phone] to [facility]    Labs   Fasting  [address]    Wonderful seeing you today!    Follow up with me in around 6 weeks!    Best,  Dr. BAH

## 2024-05-31 ENCOUNTER — TELEPHONE (OUTPATIENT)
Dept: CARDIOLOGY | Facility: HOSPITAL | Age: 64
End: 2024-05-31
Payer: COMMERCIAL

## 2024-05-31 DIAGNOSIS — I25.10 CAD IN NATIVE ARTERY: ICD-10-CM

## 2024-05-31 DIAGNOSIS — E78.2 MIXED HYPERLIPIDEMIA: Primary | ICD-10-CM

## 2024-05-31 RX ORDER — ERGOCALCIFEROL 1.25 MG/1
50000 CAPSULE ORAL
Qty: 8 CAPSULE | Refills: 0 | Status: SHIPPED | OUTPATIENT
Start: 2024-06-02 | End: 2024-07-28

## 2024-05-31 NOTE — TELEPHONE ENCOUNTER
Patient called and left a message stating that she is still being over charged for her brand name crestor even after a letter was written to her insurance as to why she needs the brand name. She said [facility] is going to be sending a faxed form for Dr. Hutchison to fill out to lower the cost of the medication for her.

## 2024-06-03 DIAGNOSIS — M31.30 NECROTIZING GRANULOMATOUS INFLAMMATION OF LUNG (MULTI): ICD-10-CM

## 2024-06-03 DIAGNOSIS — R91.1 PULMONARY NODULE: ICD-10-CM

## 2024-06-03 NOTE — PROGRESS NOTES
"Pt last saw Dr. Eid in June 2023, which he ordered a CT chest to be completed in June 2024. Upon record review, CT is not authorized and is now a closed case due to \"Appointment date does not fall in the date range of the referral\". Old order was canceled and a new order was placed for the CT chest. Pt has a FUV with Leslye Peres CNP on 6/26/2024.  "

## 2024-06-04 RX ORDER — EZETIMIBE 10 MG/1
10 TABLET ORAL NIGHTLY
Qty: 90 TABLET | Refills: 3 | Status: SHIPPED | OUTPATIENT
Start: 2024-06-04

## 2024-06-04 RX ORDER — TICAGRELOR 60 MG/1
60 TABLET ORAL 2 TIMES DAILY
Qty: 180 TABLET | Refills: 3 | Status: SHIPPED | OUTPATIENT
Start: 2024-06-04

## 2024-06-05 NOTE — PROGRESS NOTES
"Subjective   Patient ID: Pj Clarke is a 63 y.o. female who presents for Foot Swelling (right).    HPI   On Friday, she developed a bugle on top of her R foot, then developed severe pain on Saturday. Was unable to plantar or dorsiflex her foot like normal and it feels \"tight\" now. Able to wear weight and walking fine. Denies any erythema in her foot but the R foot does feel cold. L foot feels like normal but also cold to touch. She states both her feet are usually cold. Unsure of any trauma to her feet. She states about 2 years ago she had similar symptoms in her R foot and she was told she had tendinitis based on work up. Has never had gout before. Has been elevated her foot and using heat packs but not really helping. Taking Gabapentin which was recently increased to 300 mg BID.  She is taking tylenlol for pain but unable to tell if that is helping her symptoms. Taking her Brilinta BID as prescribed without missed doses.     She has a DVT US from 2/23 which was negative. Seen by Dr. Webster on 2/23 and was having lower extremity edema during that visit for which the DVT US was ordered.     Review of Systems  -negative except as stated above     Objective   /78   Pulse 89   Wt 104 kg (230 lb)   SpO2 98%   BMI 32.93 kg/m²     Physical Exam  General: well appearing, NAD, pleasant  HEENT: NCAT, MMM  CV: RRR, no m/r/g  PULM: CTAB, non-labored respirations   ABD: soft, NT, ND, + bowel sounds   EXT: 1+ lower extremity edema bilaterally, cold to touch bilaterally with R foot colder than left foot up to mid shin, R ankle swollen, no erythema or obvious signs of trauma or skin breakage; has a small ulcer on her R calf; no pain with R foot ROM; L DP intact, unable to find R DP pulse   SKIN: no rashes noted   NEURO: A&Ox4, no gross motor or sensory deficits  PSYCH: pleasant mood, appropriate affect      Assessment/Plan   There are no diagnoses linked to this encounter.    Pj Clarke is a 63 y.o. " Called patient and left voice in regards to information MD provided. Patient was advised to call office if any additional questions/concerns.    female who presents for Foot Swelling (right).     #Right Foot Swelling   ::Has a known history of RLE swelling and chronic venous insufficiency  ::has a history of posterior tibial tendinitis    ::Hospitalized from 1/18-1/20 for L foot cellulitis and left toe fracture   ::Having R foot swelling, tightness and pain since Friday   ::Exam negative for cellulitis or other infectious etiology, possibly due to new DVT although on Brilinta vs worsening venous insufficiency vs PAD considering cold to touch bilaterally and trouble with finding DP pulses vs due to acute injury as she is reporting being unsure if she rolled her ankle   ::DVT US 2/23 neg   -On Gabapentin 300 mg BID   -Wearing boots bilaterally since closed reduction in Left hallux in January   -Referral to vascular medicine for PAD evaluation   -Order R foot xray and check PVR/LEA   -Advise to continue to keep her feel elevated

## 2024-06-11 DIAGNOSIS — Z79.4 TYPE 2 DIABETES MELLITUS WITH OTHER SPECIFIED COMPLICATION, WITH LONG-TERM CURRENT USE OF INSULIN (MULTI): ICD-10-CM

## 2024-06-11 DIAGNOSIS — M79.606 PAIN OF LOWER EXTREMITY, UNSPECIFIED LATERALITY: ICD-10-CM

## 2024-06-11 DIAGNOSIS — E11.69 TYPE 2 DIABETES MELLITUS WITH OTHER SPECIFIED COMPLICATION, WITH LONG-TERM CURRENT USE OF INSULIN (MULTI): ICD-10-CM

## 2024-06-11 RX ORDER — GABAPENTIN 300 MG/1
300 CAPSULE ORAL NIGHTLY
Qty: 90 CAPSULE | Refills: 2 | Status: SHIPPED | OUTPATIENT
Start: 2024-06-11

## 2024-06-12 ENCOUNTER — PHARMACY VISIT (OUTPATIENT)
Dept: PHARMACY | Facility: CLINIC | Age: 64
End: 2024-06-12
Payer: COMMERCIAL

## 2024-06-12 DIAGNOSIS — Z79.4 TYPE 2 DIABETES MELLITUS WITH OTHER SPECIFIED COMPLICATION, WITH LONG-TERM CURRENT USE OF INSULIN (MULTI): Primary | ICD-10-CM

## 2024-06-12 DIAGNOSIS — E11.69 TYPE 2 DIABETES MELLITUS WITH OTHER SPECIFIED COMPLICATION, WITH LONG-TERM CURRENT USE OF INSULIN (MULTI): Primary | ICD-10-CM

## 2024-06-12 PROCEDURE — RXMED WILLOW AMBULATORY MEDICATION CHARGE

## 2024-06-12 RX ORDER — TIRZEPATIDE 10 MG/.5ML
10 INJECTION, SOLUTION SUBCUTANEOUS
Qty: 2 ML | Refills: 0 | Status: SHIPPED | OUTPATIENT
Start: 2024-06-16

## 2024-06-12 RX ORDER — TIRZEPATIDE 7.5 MG/.5ML
7.5 INJECTION, SOLUTION SUBCUTANEOUS
Qty: 2 ML | Refills: 0 | OUTPATIENT
Start: 2024-06-16

## 2024-06-13 ENCOUNTER — HOSPITAL ENCOUNTER (OUTPATIENT)
Dept: CARDIOLOGY | Facility: HOSPITAL | Age: 64
Discharge: HOME | End: 2024-06-13
Payer: COMMERCIAL

## 2024-06-13 ENCOUNTER — OFFICE VISIT (OUTPATIENT)
Dept: CARDIOLOGY | Facility: HOSPITAL | Age: 64
End: 2024-06-13
Payer: COMMERCIAL

## 2024-06-13 VITALS
DIASTOLIC BLOOD PRESSURE: 57 MMHG | WEIGHT: 220 LBS | OXYGEN SATURATION: 97 % | HEIGHT: 70 IN | BODY MASS INDEX: 31.5 KG/M2 | HEART RATE: 89 BPM | SYSTOLIC BLOOD PRESSURE: 96 MMHG

## 2024-06-13 DIAGNOSIS — I50.22 CHRONIC SYSTOLIC CONGESTIVE HEART FAILURE (MULTI): ICD-10-CM

## 2024-06-13 DIAGNOSIS — E78.5 HYPERLIPIDEMIA, UNSPECIFIED HYPERLIPIDEMIA TYPE: ICD-10-CM

## 2024-06-13 DIAGNOSIS — I25.10 CAD IN NATIVE ARTERY: ICD-10-CM

## 2024-06-13 DIAGNOSIS — I25.5 CARDIOMYOPATHY, ISCHEMIC: ICD-10-CM

## 2024-06-13 DIAGNOSIS — I10 BENIGN ESSENTIAL HYPERTENSION: ICD-10-CM

## 2024-06-13 DIAGNOSIS — R42 DIZZINESS: Primary | ICD-10-CM

## 2024-06-13 LAB
AORTIC VALVE MEAN GRADIENT: 2.9 MMHG
AORTIC VALVE PEAK VELOCITY: 1.12 M/S
AV PEAK GRADIENT: 5 MMHG
AVA (PEAK VEL): 1.13 CM2
AVA (VTI): 1.14 CM2
EJECTION FRACTION APICAL 4 CHAMBER: 39.2
LEFT ATRIUM VOLUME AREA LENGTH INDEX BSA: 18.3 ML/M2
LEFT VENTRICLE INTERNAL DIMENSION DIASTOLE: 5.5 CM (ref 3.5–6)
LEFT VENTRICULAR OUTFLOW TRACT DIAMETER: 2 CM
LV EJECTION FRACTION BIPLANE: 37 %
MITRAL VALVE E/A RATIO: 0.75
RIGHT VENTRICLE FREE WALL PEAK S': 7 CM/S
RIGHT VENTRICLE PEAK SYSTOLIC PRESSURE: 24.3 MMHG
TRICUSPID ANNULAR PLANE SYSTOLIC EXCURSION: 1.8 CM

## 2024-06-13 PROCEDURE — 3008F BODY MASS INDEX DOCD: CPT | Performed by: NURSE PRACTITIONER

## 2024-06-13 PROCEDURE — 99214 OFFICE O/P EST MOD 30 MIN: CPT | Performed by: NURSE PRACTITIONER

## 2024-06-13 PROCEDURE — 3051F HG A1C>EQUAL 7.0%<8.0%: CPT | Performed by: NURSE PRACTITIONER

## 2024-06-13 PROCEDURE — 3078F DIAST BP <80 MM HG: CPT | Performed by: NURSE PRACTITIONER

## 2024-06-13 PROCEDURE — 3074F SYST BP LT 130 MM HG: CPT | Performed by: NURSE PRACTITIONER

## 2024-06-13 PROCEDURE — 2500000004 HC RX 250 GENERAL PHARMACY W/ HCPCS (ALT 636 FOR OP/ED): Performed by: INTERNAL MEDICINE

## 2024-06-13 PROCEDURE — 3048F LDL-C <100 MG/DL: CPT | Performed by: NURSE PRACTITIONER

## 2024-06-13 PROCEDURE — 93005 ELECTROCARDIOGRAM TRACING: CPT | Performed by: NURSE PRACTITIONER

## 2024-06-13 PROCEDURE — 1036F TOBACCO NON-USER: CPT | Performed by: NURSE PRACTITIONER

## 2024-06-13 NOTE — PROGRESS NOTES
Primary Care Physician: Sigifredo Webster MD  Date of Visit: 06/13/2024  2:00 PM EDT  Location of visit: [facility]     Chief Complaint:   Chief Complaint   Patient presents with    Follow-up     6 month        HPI / Summary:   Pj Clarke is a 63 y.o. female presents for followup. Seen in collaboration with Dr. Hutchison. She has been able to daily activity without chest pain or dyspnea. She has noted right leg swelling since January. She cannot tolerate compression stockings. She has noted occasional dizziness when standing up. Last month she had to stop to hold onto something. No syncope. She has had 3 episodes since January. She does wonder if these episode coincide with dose increase of Mounjaro. She did not check her blood sugar or blood pressure at the time of these episodes. She does not she wakes up at times with blood sugar in the 80s. She has also noted bilateral leg cramping at night. She has intentionally been losing weight.  The patient denies chest pain, shortness of breath, palpitations, syncope, orthopnea, paroxysmal nocturnal dyspnea, or bleeding problems.                Past Medical History:  Past Medical History:   Diagnosis Date    Acute ischemic heart disease, unspecified (Multi)     Acute coronary syndrome    Breast cyst had 1 as a teenager.    CHF (congestive heart failure) (Multi)     Dermatochalasis of unspecified eye, unspecified eyelid 11/13/2018    Dermatochalasis    Diabetes mellitus (Multi)     Essential (primary) hypertension 11/16/2022    Benign essential hypertension    Personal history of other mental and behavioral disorders     History of depression    Pure hyperglyceridemia 11/28/2016    Essential hypertriglyceridemia        Past Surgical History:  Past Surgical History:   Procedure Laterality Date    BREAST CYST ASPIRATION  when i was a young teenager.    CHOLECYSTECTOMY  06/19/2013    Cholecystectomy    GALLBLADDER SURGERY  05/11/2016    Gallbladder Surgery     "HYSTERECTOMY  02/11/2015    Hysterectomy    OTHER SURGICAL HISTORY  06/29/2021    Lung wedge resection    OTHER SURGICAL HISTORY  01/03/2017    Implantable Cardioverter-Defibrillator    TONSILLECTOMY  05/11/2016    Tonsillectomy          Social History:   reports that she has never smoked. She has never been exposed to tobacco smoke. She has never used smokeless tobacco. She reports current alcohol use. She reports that she does not currently use drugs.     Family History:  family history includes Colon cancer in her mother's sister; Family history of diabetes mellitus in an other family member; Malignant neoplasm in her father and mother.      Allergies:  Allergies   Allergen Reactions    Penicillins Hives and Shortness of breath    Atorvastatin Other     severe leg cramps    Metformin Hcl Diarrhea     Only with HCL brand.   Good with ER       Outpatient Medications:  Current Outpatient Medications   Medication Instructions    aspirin 81 mg, oral, Daily    Brilinta 60 mg, oral, 2 times daily    Crestor 40 mg, oral, Daily    dapagliflozin propanediol (FARXIGA) 10 mg, oral, Daily    ergocalciferol (VITAMIN D-2) 50,000 Units, oral, Once Weekly    ezetimibe (ZETIA) 10 mg, oral, Nightly    furosemide (LASIX) 20 mg, oral, Daily    gabapentin (NEURONTIN) 300 mg, oral, Nightly, Take at bedtime.    glimepiride (AMARYL) 4 mg, oral, 2 times daily    insulin glargine (Basaglar KwikPen U-100 Insulin) 100 unit/mL (3 mL) pen INJECT 30 UNITS UNDER THE SKIN TWO TIMES A DAY.    metFORMIN XR (GLUCOPHAGE-XR) 1,000 mg, oral, 2 times daily    metoprolol succinate XL (TOPROL-XL) 100 mg, oral, Every 12 hours    [START ON 6/16/2024] Mounjaro 10 mg, subcutaneous, Once Weekly    omeprazole (PRILOSEC) 40 mg, oral, Daily, Do not crush or chew    pen needle, diabetic (BD Teri 2nd Gen Pen Needle) 32 gauge x 5/32\" needle USE TO INJECT INSULIN TWICE DAILY    pen needle, diabetic 31 gauge x 1/4\" needle miscellaneous, Use  as directed    potassium " "chloride CR 20 mEq ER tablet 20 mEq, oral, Daily    sacubitriL-valsartan (Entresto) 49-51 mg tablet 1 tablet, oral, 2 times daily    spironolactone (Aldactone) 25 mg tablet 0.5 tablets, oral, Daily       Physical Exam:  Vitals:    06/13/24 1400   BP: 96/57   BP Location: Left arm   Pulse: 89   SpO2: 97%   Weight: 99.8 kg (220 lb)   Height: 1.778 m (5' 10\")     Wt Readings from Last 5 Encounters:   06/13/24 99.8 kg (220 lb)   05/30/24 102 kg (224 lb 8 oz)   04/05/24 103 kg (227 lb)   03/18/24 104 kg (230 lb)   02/23/24 104 kg (230 lb)     Body mass index is 31.57 kg/m².   GENERAL: alert, cooperative, pleasant, in no acute distress  SKIN: warm and dry  NECK: Normal JVD, negative HJR  CARDIAC: Regular rate and rhythm with no rubs, murmurs, or gallops  CHEST: Normal respiratory efforts, lungs clear to auscultation bilaterally.  ABDOMEN: soft, nontender, nondistended  EXTREMITIES: +1 right lower extremity edema and trace left lower extremity edema, +2 palpable RP bilaterally     Last Labs:  Recent Labs     05/30/24  1359 01/20/24  1049 01/19/24  0421   WBC 5.5 5.4 5.7   HGB 14.6 13.3 12.4   HCT 43.6 41.1 38.8    257 248   MCV 91 93 92     Recent Labs     05/30/24  1359 02/23/24  1338 01/20/24  1400    140 136   K 4.6 4.9 4.3   * 108* 106   BUN 28* 21 24*   CREATININE 1.08* 1.04 1.31*     CMP -  Lab Results   Component Value Date    CALCIUM 10.3 05/30/2024    PHOS 3.8 02/23/2024    PROT 6.6 05/30/2024    ALBUMIN 4.5 05/30/2024    AST 13 05/30/2024    ALT 15 05/30/2024    ALKPHOS 68 05/30/2024    BILITOT 0.6 05/30/2024       LIPID PANEL -   Lab Results   Component Value Date    CHOL 88 05/30/2024    HDL 33.9 05/30/2024    LDLF - 09/23/2023    TRIG 223 (H) 05/30/2024   LDL 10 5/30/24    Lab Results   Component Value Date    BNP 54 07/18/2019    HGBA1C 7.3 (H) 05/30/2024    HGBA1C 10.5 (H) 04/08/2019       Last Cardiology Tests:  ECG:  Obtained and reviewed EKG- normal sinus rhythm HR 86, RBBB, inferior " infarct and anteroseptal infarct     Echo:    Preliminary echocardiogram results today showed LV function of 35-40%    March 17, 2021  CONCLUSIONS:   1. The left ventricular systolic function is moderately decreased with a 35% estimated ejection fraction.   2. Mid and apical anterior septum, apex, and mid and apical anterior wall are abnormal.   3. Spectral Doppler shows an impaired relaxation pattern of left ventricular diastolic filling.           Cath:  July 19, 2019  Coronary Lesion Summary:  Vessel   Stenosis   Vessel Segment  LAD    30% stenosis      mid  RCA    40% stenosis    proximal  CONCLUSIONS:   1. Nonobstructive CAD in a right dominant system.   2. Patent stent in proximal LAD.   3. Elevated left and right heart filling pressures.   4. Mild pulmonary hypertension.   5. Preserved cardiac index.   6. No evidence of intracardiac shunt.   7. No aortic stenosis.        October 10, 2016  Coronary Lesion Summary:  Vessel       Stenosis    Vessel Segment  LAD        100% stenosis    proximal  Circumflex 30% stenosis      distal  CONCLUSIONS:   1. Culprit vessel(s): left anterior descending.   2. Successful OCT guided PCI of the proximal LAD with a 3.5 x 28mm Xience Alpine JAQUI postdilated to 4.0mm.   3. Severe single vessel CAD in a right dominant system.   4. Elevated LVEDP.   5. No aortic stenosis.        Stress Test:  Stress Results:  No results found for this or any previous visit from the past 365 days.           Cardiac Imaging:  CT of the chest without contrast 2/28/2023  IMPRESSION:  1. Stable postsurgical changes of left upper lobe and left lower lobe  wedge resection with unchanged platelike and nodular thickening along  the left lower lobe surgical bed. This likely represents postsurgical  scarring.  2. Unchanged innumerable bilateral solid noncalcified pulmonary  nodules measuring up to 5 mm. Continued attention on follow-up  imaging is recommended.  3. Moderate coronary artery calcification with  suggestion of coronary  artery stent. Suggestion of sequela of prior infarction in the LAD  territory     PVR December 30, 2021  CONCLUSIONS:     Right Lower PVR: No evidence of arterial occlusive disease in the right lower extremity at rest. Normal digital perfusion noted. Triphasic flow is noted in the right common femoral artery, right posterior tibial artery and right dorsalis pedis artery.     Left Lower PVR: No evidence of arterial occlusive disease in the left lower extremity at rest. Normal digital perfusion noted. Triphasic flow is noted in the left common femoral artery, left posterior tibial artery and left dorsalis pedis artery.    Device check May 2024: 0 VF detections or 0 AT/AF detections       Assessment/Plan   Pj was seen today for follow-up.  Diagnoses and all orders for this visit:  Dizziness (Primary)  -     ECG 12 lead (Clinic Performed)  CAD in native artery  Benign essential hypertension  Hyperlipidemia, unspecified hyperlipidemia type  Cardiomyopathy, ischemic  Chronic systolic congestive heart failure (Multi)        In summary Ms. Clarke is a pleasant 63 year-old white female past medical history significant for type II insulin-requiring diabetes, hypertension, hyperlipidemia, and coronary artery disease with non-ST elevation myocardial infarction (2/2015), anterior ST elevation MI s/p PCI with drug eluding stent to proximal LAD on 10/10/16, with residual ischemic cardiomyopathy EF 30% s/p ICD.  She has occasional dizziness when walking. Most recent episode a month ago. Her blood pressure today is soft, but this is not unusual for her. She will let me know if dizziness becomes more frequent. I have recommended she monitor her blood pressure a couple times a week and also check both blood sugar and blood pressure if she has reoccurring dizziness. Her echocardiogram today showed LV function of 35 to 40%. She is euvolemic by clinical exam. I did recommend tubi  stocking for swelling  in her right leg. She cannot tolerate compression stockings. Her recent lipid panel has improved, triglycerides remain elevated. She has not been taking her Crestor for past 3 weeks due to cost- we are going to try to do a tier reduction.  She will continue her efforts with regard to diet, exercise, weight loss. She is euvolemic on exam. She should continue her current cardiovascular medications.  We will see her back in follow-up in 4 months.       Orders:  No orders of the defined types were placed in this encounter.     Followup Appts:  Future Appointments   Date Time Provider Department Center   6/17/2024  3:30 PM Hyacinth Rodriguez MD [id number] [facility]   6/28/2024  9:00 AM [facility] Jazmyn   7/1/2024 10:00 AM ERIC De Jesus [id number] [facility]   7/1/2024  2:00 PM MIN VASC ROOM [id number] [facility] Minoff H   7/2/2024  2:00 PM [facility] [id number] ULTRASOUND 1 [id number] [facility] Minoff H           ____________________________________________________________  ERIC Patel  [facility]  [facility]

## 2024-06-13 NOTE — PATIENT INSTRUCTIONS
Continue current cardiovascular medications  Please call if dizziness becomes more frequent  Follow up in October

## 2024-06-14 LAB
ATRIAL RATE: 86 BPM
P AXIS: 80 DEGREES
P OFFSET: 178 MS
P ONSET: 132 MS
PR INTERVAL: 176 MS
Q ONSET: 220 MS
QRS COUNT: 14 BEATS
QRS DURATION: 136 MS
QT INTERVAL: 392 MS
QTC CALCULATION(BAZETT): 469 MS
QTC FREDERICIA: 442 MS
R AXIS: -14 DEGREES
T AXIS: 34 DEGREES
T OFFSET: 416 MS
VENTRICULAR RATE: 86 BPM

## 2024-06-17 ENCOUNTER — APPOINTMENT (OUTPATIENT)
Dept: OPHTHALMOLOGY | Facility: CLINIC | Age: 64
End: 2024-06-17
Payer: COMMERCIAL

## 2024-06-17 DIAGNOSIS — H52.223 REGULAR ASTIGMATISM, BILATERAL: ICD-10-CM

## 2024-06-17 DIAGNOSIS — H52.4 PRESBYOPIA: ICD-10-CM

## 2024-06-17 DIAGNOSIS — H52.13 MYOPIA OF BOTH EYES: ICD-10-CM

## 2024-06-17 DIAGNOSIS — E11.9 CONTROLLED TYPE 2 DIABETES MELLITUS WITHOUT COMPLICATION, UNSPECIFIED WHETHER LONG TERM INSULIN USE (MULTI): Primary | ICD-10-CM

## 2024-06-17 PROCEDURE — 92015 DETERMINE REFRACTIVE STATE: CPT | Performed by: OPHTHALMOLOGY

## 2024-06-17 PROCEDURE — 92014 COMPRE OPH EXAM EST PT 1/>: CPT | Performed by: OPHTHALMOLOGY

## 2024-06-17 ASSESSMENT — SLIT LAMP EXAM - LIDS: COMMENTS: DERMATOCHALASIS

## 2024-06-17 ASSESSMENT — VISUAL ACUITY
OS_CC: 20/30
METHOD: SNELLEN - LINEAR
OD_CC: 20/30

## 2024-06-17 ASSESSMENT — REFRACTION_WEARINGRX
OD_CYLINDER: -0.75
OS_AXIS: 175
OD_AXIS: 135
OD_ADD: +2.50
OD_SPHERE: -6.75
OS_CYLINDER: -1.50
OS_SPHERE: -5.25
OS_ADD: +2.50

## 2024-06-17 ASSESSMENT — REFRACTION_MANIFEST
OS_SPHERE: -6.00
OS_ADD: +2.50
OS_CYLINDER: -1.25
OD_ADD: +2.50
OD_SPHERE: -5.75
OD_CYLINDER: -0.50
OS_AXIS: 180
OD_AXIS: 175

## 2024-06-17 ASSESSMENT — TONOMETRY
OD_IOP_MMHG: 16
OS_IOP_MMHG: 17
IOP_METHOD: TONOPEN

## 2024-06-17 ASSESSMENT — ENCOUNTER SYMPTOMS
GASTROINTESTINAL NEGATIVE: 0
MUSCULOSKELETAL NEGATIVE: 0
RESPIRATORY NEGATIVE: 0
NEUROLOGICAL NEGATIVE: 0
CARDIOVASCULAR NEGATIVE: 0
PSYCHIATRIC NEGATIVE: 0
ALLERGIC/IMMUNOLOGIC NEGATIVE: 0
HEMATOLOGIC/LYMPHATIC NEGATIVE: 0
CONSTITUTIONAL NEGATIVE: 0
ENDOCRINE NEGATIVE: 0
EYES NEGATIVE: 1

## 2024-06-17 ASSESSMENT — CUP TO DISC RATIO
OD_RATIO: .3
OS_RATIO: .3

## 2024-06-17 ASSESSMENT — EXTERNAL EXAM - RIGHT EYE: OD_EXAM: NORMAL

## 2024-06-17 ASSESSMENT — EXTERNAL EXAM - LEFT EYE: OS_EXAM: NORMAL

## 2024-06-17 NOTE — PROGRESS NOTES
Assessment/Plan   Diagnoses and all orders for this visit:  Controlled type 2 diabetes mellitus without complication, unspecified whether long term insulin use (Multi)  no retinopathy observed on exam today od/os, pt ed to continue good BGlc, blood pressure and lipid control, rtc with any changes in vision, otherwise monitor 1 year   Myopia of both eyes  Regular astigmatism, bilateral  Presbyopia  Glasses prescription given to patient today

## 2024-06-19 DIAGNOSIS — I25.5 CARDIOMYOPATHY, ISCHEMIC: ICD-10-CM

## 2024-06-19 DIAGNOSIS — E78.2 MIXED HYPERLIPIDEMIA: ICD-10-CM

## 2024-06-19 DIAGNOSIS — Z79.4 TYPE 2 DIABETES MELLITUS WITHOUT COMPLICATION, WITH LONG-TERM CURRENT USE OF INSULIN (MULTI): ICD-10-CM

## 2024-06-19 DIAGNOSIS — I25.10 CAD IN NATIVE ARTERY: ICD-10-CM

## 2024-06-19 DIAGNOSIS — I10 BENIGN ESSENTIAL HYPERTENSION: ICD-10-CM

## 2024-06-19 DIAGNOSIS — M79.606 PAIN OF LOWER EXTREMITY, UNSPECIFIED LATERALITY: ICD-10-CM

## 2024-06-19 DIAGNOSIS — E11.69 TYPE 2 DIABETES MELLITUS WITH OTHER SPECIFIED COMPLICATION, WITH LONG-TERM CURRENT USE OF INSULIN (MULTI): ICD-10-CM

## 2024-06-19 DIAGNOSIS — E55.9 MILD VITAMIN D DEFICIENCY: ICD-10-CM

## 2024-06-19 DIAGNOSIS — E11.9 TYPE 2 DIABETES MELLITUS WITHOUT COMPLICATION, WITH LONG-TERM CURRENT USE OF INSULIN (MULTI): ICD-10-CM

## 2024-06-19 DIAGNOSIS — Z79.4 TYPE 2 DIABETES MELLITUS WITH OTHER SPECIFIED COMPLICATION, WITH LONG-TERM CURRENT USE OF INSULIN (MULTI): ICD-10-CM

## 2024-06-19 RX ORDER — GABAPENTIN 300 MG/1
300 CAPSULE ORAL 2 TIMES DAILY
Qty: 180 CAPSULE | Refills: 3 | Status: SHIPPED | OUTPATIENT
Start: 2024-06-19 | End: 2024-06-20 | Stop reason: SDUPTHER

## 2024-06-19 RX ORDER — INSULIN GLARGINE 100 [IU]/ML
INJECTION, SOLUTION SUBCUTANEOUS
Qty: 12 ML | Refills: 3 | Status: SHIPPED | OUTPATIENT
Start: 2024-06-19 | End: 2024-06-20 | Stop reason: SDUPTHER

## 2024-06-19 RX ORDER — GLIMEPIRIDE 4 MG/1
4 TABLET ORAL 2 TIMES DAILY
Qty: 180 TABLET | Refills: 1 | Status: SHIPPED | OUTPATIENT
Start: 2024-06-19 | End: 2024-06-20 | Stop reason: SDUPTHER

## 2024-06-19 RX ORDER — METFORMIN HYDROCHLORIDE 500 MG/1
1000 TABLET, EXTENDED RELEASE ORAL
Qty: 360 TABLET | Refills: 1 | Status: SHIPPED | OUTPATIENT
Start: 2024-06-19 | End: 2024-06-20 | Stop reason: SDUPTHER

## 2024-06-19 RX ORDER — ERGOCALCIFEROL 1.25 MG/1
50000 CAPSULE ORAL
Qty: 6 CAPSULE | Refills: 3 | Status: SHIPPED | OUTPATIENT
Start: 2024-06-19 | End: 2024-06-20 | Stop reason: SDUPTHER

## 2024-06-20 DIAGNOSIS — E78.2 MIXED HYPERLIPIDEMIA: ICD-10-CM

## 2024-06-20 DIAGNOSIS — Z79.4 TYPE 2 DIABETES MELLITUS WITHOUT COMPLICATION, WITH LONG-TERM CURRENT USE OF INSULIN (MULTI): ICD-10-CM

## 2024-06-20 DIAGNOSIS — E11.9 TYPE 2 DIABETES MELLITUS TREATED WITH INSULIN (MULTI): ICD-10-CM

## 2024-06-20 DIAGNOSIS — Z79.4 TYPE 2 DIABETES MELLITUS TREATED WITH INSULIN (MULTI): ICD-10-CM

## 2024-06-20 DIAGNOSIS — M79.606 PAIN OF LOWER EXTREMITY, UNSPECIFIED LATERALITY: ICD-10-CM

## 2024-06-20 DIAGNOSIS — Z79.4 TYPE 2 DIABETES MELLITUS WITH OTHER SPECIFIED COMPLICATION, WITH LONG-TERM CURRENT USE OF INSULIN (MULTI): ICD-10-CM

## 2024-06-20 DIAGNOSIS — E11.59 TYPE 2 DIABETES MELLITUS WITH OTHER CIRCULATORY COMPLICATION, WITH LONG-TERM CURRENT USE OF INSULIN (MULTI): ICD-10-CM

## 2024-06-20 DIAGNOSIS — E55.9 MILD VITAMIN D DEFICIENCY: ICD-10-CM

## 2024-06-20 DIAGNOSIS — Z79.4 TYPE 2 DIABETES MELLITUS WITH OTHER CIRCULATORY COMPLICATION, WITH LONG-TERM CURRENT USE OF INSULIN (MULTI): ICD-10-CM

## 2024-06-20 DIAGNOSIS — E11.9 TYPE 2 DIABETES MELLITUS WITHOUT COMPLICATION, WITH LONG-TERM CURRENT USE OF INSULIN (MULTI): ICD-10-CM

## 2024-06-20 DIAGNOSIS — K21.9 GASTROESOPHAGEAL REFLUX DISEASE, UNSPECIFIED WHETHER ESOPHAGITIS PRESENT: ICD-10-CM

## 2024-06-20 DIAGNOSIS — I25.10 CAD IN NATIVE ARTERY: ICD-10-CM

## 2024-06-20 DIAGNOSIS — I10 BENIGN ESSENTIAL HYPERTENSION: ICD-10-CM

## 2024-06-20 DIAGNOSIS — I25.5 CARDIOMYOPATHY, ISCHEMIC: ICD-10-CM

## 2024-06-20 DIAGNOSIS — E78.00 PURE HYPERCHOLESTEROLEMIA: ICD-10-CM

## 2024-06-20 DIAGNOSIS — E11.69 TYPE 2 DIABETES MELLITUS WITH OTHER SPECIFIED COMPLICATION, WITH LONG-TERM CURRENT USE OF INSULIN (MULTI): ICD-10-CM

## 2024-06-20 RX ORDER — SPIRONOLACTONE 25 MG/1
12.5 TABLET ORAL DAILY
Qty: 45 TABLET | Refills: 3 | Status: SHIPPED | OUTPATIENT
Start: 2024-06-20 | End: 2024-06-20 | Stop reason: SDUPTHER

## 2024-06-20 RX ORDER — ROSUVASTATIN CALCIUM 40 MG/1
40 TABLET, FILM COATED ORAL DAILY
Qty: 90 TABLET | Refills: 3 | Status: SHIPPED | OUTPATIENT
Start: 2024-06-20

## 2024-06-20 RX ORDER — GLIMEPIRIDE 4 MG/1
4 TABLET ORAL 2 TIMES DAILY
Qty: 180 TABLET | Refills: 1 | Status: SHIPPED | OUTPATIENT
Start: 2024-06-20

## 2024-06-20 RX ORDER — OMEPRAZOLE 40 MG/1
40 CAPSULE, DELAYED RELEASE ORAL DAILY
Qty: 90 CAPSULE | Refills: 1 | Status: SHIPPED | OUTPATIENT
Start: 2024-06-20

## 2024-06-20 RX ORDER — SACUBITRIL AND VALSARTAN 49; 51 MG/1; MG/1
1 TABLET, FILM COATED ORAL 2 TIMES DAILY
Qty: 180 TABLET | Refills: 3 | Status: SHIPPED | OUTPATIENT
Start: 2024-06-20 | End: 2024-06-20 | Stop reason: SDUPTHER

## 2024-06-20 RX ORDER — INSULIN GLARGINE 100 [IU]/ML
INJECTION, SOLUTION SUBCUTANEOUS
Qty: 45 ML | Refills: 1 | Status: SHIPPED | OUTPATIENT
Start: 2024-06-20

## 2024-06-20 RX ORDER — FUROSEMIDE 20 MG/1
20 TABLET ORAL DAILY
Qty: 90 TABLET | Refills: 3 | Status: SHIPPED | OUTPATIENT
Start: 2024-06-20 | End: 2025-06-20

## 2024-06-20 RX ORDER — GABAPENTIN 300 MG/1
300 CAPSULE ORAL 2 TIMES DAILY
Qty: 180 CAPSULE | Refills: 1 | Status: SHIPPED | OUTPATIENT
Start: 2024-06-20

## 2024-06-20 RX ORDER — METFORMIN HYDROCHLORIDE 500 MG/1
1000 TABLET, EXTENDED RELEASE ORAL
Qty: 360 TABLET | Refills: 1 | Status: SHIPPED | OUTPATIENT
Start: 2024-06-20

## 2024-06-20 RX ORDER — METOPROLOL SUCCINATE 100 MG/1
100 TABLET, EXTENDED RELEASE ORAL
Qty: 180 TABLET | Refills: 3 | Status: SHIPPED | OUTPATIENT
Start: 2024-06-20 | End: 2024-06-20 | Stop reason: SDUPTHER

## 2024-06-20 RX ORDER — SACUBITRIL AND VALSARTAN 49; 51 MG/1; MG/1
1 TABLET, FILM COATED ORAL 2 TIMES DAILY
Qty: 180 TABLET | Refills: 3 | Status: SHIPPED | OUTPATIENT
Start: 2024-06-20 | End: 2025-06-20

## 2024-06-20 RX ORDER — DAPAGLIFLOZIN 10 MG/1
10 TABLET, FILM COATED ORAL DAILY
Qty: 90 TABLET | Refills: 1 | Status: SHIPPED | OUTPATIENT
Start: 2024-06-20

## 2024-06-20 RX ORDER — METOPROLOL SUCCINATE 100 MG/1
100 TABLET, EXTENDED RELEASE ORAL
Qty: 180 TABLET | Refills: 3 | Status: SHIPPED | OUTPATIENT
Start: 2024-06-20 | End: 2025-06-20

## 2024-06-20 RX ORDER — EZETIMIBE 10 MG/1
10 TABLET ORAL DAILY
Qty: 90 TABLET | Refills: 3 | Status: SHIPPED | OUTPATIENT
Start: 2024-06-20 | End: 2024-06-20 | Stop reason: SDUPTHER

## 2024-06-20 RX ORDER — EZETIMIBE 10 MG/1
10 TABLET ORAL DAILY
Qty: 90 TABLET | Refills: 3 | Status: SHIPPED | OUTPATIENT
Start: 2024-06-20 | End: 2025-06-20

## 2024-06-20 RX ORDER — FUROSEMIDE 20 MG/1
20 TABLET ORAL DAILY
Qty: 90 TABLET | Refills: 3 | Status: SHIPPED | OUTPATIENT
Start: 2024-06-20 | End: 2024-06-20 | Stop reason: SDUPTHER

## 2024-06-20 RX ORDER — PEN NEEDLE, DIABETIC 30 GX3/16"
1 NEEDLE, DISPOSABLE MISCELLANEOUS 2 TIMES DAILY
Qty: 200 EACH | Refills: 3 | Status: SHIPPED | OUTPATIENT
Start: 2024-06-20

## 2024-06-20 RX ORDER — ERGOCALCIFEROL 1.25 MG/1
50000 CAPSULE ORAL
Qty: 6 CAPSULE | Refills: 3 | Status: SHIPPED | OUTPATIENT
Start: 2024-06-20

## 2024-06-20 RX ORDER — SPIRONOLACTONE 25 MG/1
12.5 TABLET ORAL DAILY
Qty: 45 TABLET | Refills: 3 | Status: SHIPPED | OUTPATIENT
Start: 2024-06-20 | End: 2025-06-20

## 2024-06-20 NOTE — TELEPHONE ENCOUNTER
----- Message from Pj Lutz sent at 6/19/2024  7:17 PM EDT -----  Regarding: jP Lutz - Ref: RX  Contact: [phone]  Laure Tyler.  My apologies.  The request for the new medications for 3 month supply should go to:  [facility].  Thank you.  Pj Lutz

## 2024-06-24 DIAGNOSIS — I25.5 CARDIOMYOPATHY, ISCHEMIC: Primary | ICD-10-CM

## 2024-06-24 DIAGNOSIS — I25.10 CAD IN NATIVE ARTERY: ICD-10-CM

## 2024-06-24 DIAGNOSIS — E78.2 MIXED HYPERLIPIDEMIA: ICD-10-CM

## 2024-06-24 DIAGNOSIS — I10 BENIGN ESSENTIAL HYPERTENSION: ICD-10-CM

## 2024-06-24 DIAGNOSIS — I50.22 CHRONIC SYSTOLIC CONGESTIVE HEART FAILURE (MULTI): ICD-10-CM

## 2024-06-24 RX ORDER — EZETIMIBE 10 MG/1
10 TABLET ORAL DAILY
Qty: 90 TABLET | Refills: 3 | Status: SHIPPED | OUTPATIENT
Start: 2024-06-24 | End: 2025-06-24

## 2024-06-24 RX ORDER — FUROSEMIDE 20 MG/1
20 TABLET ORAL DAILY
Qty: 90 TABLET | Refills: 3 | Status: SHIPPED | OUTPATIENT
Start: 2024-06-24 | End: 2025-06-24

## 2024-06-24 RX ORDER — SACUBITRIL AND VALSARTAN 49; 51 MG/1; MG/1
1 TABLET, FILM COATED ORAL 2 TIMES DAILY
Qty: 180 TABLET | Refills: 3 | Status: SHIPPED | OUTPATIENT
Start: 2024-06-24 | End: 2025-06-24

## 2024-06-24 RX ORDER — METOPROLOL SUCCINATE 100 MG/1
100 TABLET, EXTENDED RELEASE ORAL
Qty: 180 TABLET | Refills: 3 | Status: SHIPPED | OUTPATIENT
Start: 2024-06-24 | End: 2025-06-24

## 2024-06-24 RX ORDER — SPIRONOLACTONE 25 MG/1
12.5 TABLET ORAL DAILY
Qty: 45 TABLET | Refills: 3 | Status: SHIPPED | OUTPATIENT
Start: 2024-06-24 | End: 2025-06-24

## 2024-06-25 ENCOUNTER — HOSPITAL ENCOUNTER (OUTPATIENT)
Dept: CARDIOLOGY | Facility: CLINIC | Age: 64
Discharge: HOME | End: 2024-06-25
Payer: COMMERCIAL

## 2024-06-25 DIAGNOSIS — I47.20 VT (VENTRICULAR TACHYCARDIA) (MULTI): ICD-10-CM

## 2024-06-25 PROCEDURE — 93296 REM INTERROG EVL PM/IDS: CPT

## 2024-06-25 PROCEDURE — 93295 DEV INTERROG REMOTE 1/2/MLT: CPT | Performed by: INTERNAL MEDICINE

## 2024-06-26 ENCOUNTER — APPOINTMENT (OUTPATIENT)
Dept: PULMONOLOGY | Facility: HOSPITAL | Age: 64
End: 2024-06-26
Payer: COMMERCIAL

## 2024-06-28 ENCOUNTER — HOSPITAL ENCOUNTER (OUTPATIENT)
Dept: RADIOLOGY | Facility: HOSPITAL | Age: 64
Discharge: HOME | End: 2024-06-28
Payer: COMMERCIAL

## 2024-06-28 DIAGNOSIS — M31.30 NECROTIZING GRANULOMATOUS INFLAMMATION OF LUNG (MULTI): ICD-10-CM

## 2024-06-28 DIAGNOSIS — R91.1 PULMONARY NODULE: ICD-10-CM

## 2024-06-28 PROCEDURE — 71250 CT THORAX DX C-: CPT

## 2024-07-01 ENCOUNTER — HOSPITAL ENCOUNTER (OUTPATIENT)
Dept: VASCULAR MEDICINE | Facility: CLINIC | Age: 64
Discharge: HOME | End: 2024-07-01
Payer: COMMERCIAL

## 2024-07-01 DIAGNOSIS — M79.89 SWELLING OF RIGHT LOWER EXTREMITY: ICD-10-CM

## 2024-07-01 PROCEDURE — 93971 EXTREMITY STUDY: CPT | Performed by: INTERNAL MEDICINE

## 2024-07-01 PROCEDURE — 93971 EXTREMITY STUDY: CPT

## 2024-07-02 ENCOUNTER — HOSPITAL ENCOUNTER (OUTPATIENT)
Dept: RADIOLOGY | Facility: CLINIC | Age: 64
Discharge: HOME | End: 2024-07-02
Payer: COMMERCIAL

## 2024-07-02 DIAGNOSIS — M79.89 SWELLING OF RIGHT LOWER EXTREMITY: ICD-10-CM

## 2024-07-02 PROCEDURE — 93978 VASCULAR STUDY: CPT

## 2024-07-02 PROCEDURE — 93978 VASCULAR STUDY: CPT | Performed by: RADIOLOGY

## 2024-07-03 DIAGNOSIS — I25.10 CAD IN NATIVE ARTERY: ICD-10-CM

## 2024-07-03 DIAGNOSIS — E78.00 PURE HYPERCHOLESTEROLEMIA: Primary | ICD-10-CM

## 2024-07-03 RX ORDER — ROSUVASTATIN CALCIUM 40 MG/1
40 TABLET, FILM COATED ORAL DAILY
Qty: 90 TABLET | Refills: 3 | Status: SHIPPED | OUTPATIENT
Start: 2024-07-03

## 2024-07-03 NOTE — TELEPHONE ENCOUNTER
----- Message from jP Lutz sent at 7/2/2024  6:56 PM EDT -----  Regarding: Pj Lutz - Ref: RX  Contact: [phone]  Yes Jessi.  [facility].  3 months at a time.  Thank you.

## 2024-07-08 DIAGNOSIS — K21.9 GASTROESOPHAGEAL REFLUX DISEASE, UNSPECIFIED WHETHER ESOPHAGITIS PRESENT: ICD-10-CM

## 2024-07-08 DIAGNOSIS — E11.69 TYPE 2 DIABETES MELLITUS WITH OTHER SPECIFIED COMPLICATION, WITH LONG-TERM CURRENT USE OF INSULIN (MULTI): ICD-10-CM

## 2024-07-08 DIAGNOSIS — Z79.4 TYPE 2 DIABETES MELLITUS WITH OTHER CIRCULATORY COMPLICATION, WITH LONG-TERM CURRENT USE OF INSULIN (MULTI): ICD-10-CM

## 2024-07-08 DIAGNOSIS — E11.9 TYPE 2 DIABETES MELLITUS WITHOUT COMPLICATION, WITH LONG-TERM CURRENT USE OF INSULIN (MULTI): ICD-10-CM

## 2024-07-08 DIAGNOSIS — Z79.4 TYPE 2 DIABETES MELLITUS TREATED WITH INSULIN (MULTI): ICD-10-CM

## 2024-07-08 DIAGNOSIS — Z79.4 TYPE 2 DIABETES MELLITUS WITH OTHER SPECIFIED COMPLICATION, WITH LONG-TERM CURRENT USE OF INSULIN (MULTI): ICD-10-CM

## 2024-07-08 DIAGNOSIS — M79.606 PAIN OF LOWER EXTREMITY, UNSPECIFIED LATERALITY: ICD-10-CM

## 2024-07-08 DIAGNOSIS — E11.9 TYPE 2 DIABETES MELLITUS TREATED WITH INSULIN (MULTI): ICD-10-CM

## 2024-07-08 DIAGNOSIS — E55.9 MILD VITAMIN D DEFICIENCY: ICD-10-CM

## 2024-07-08 DIAGNOSIS — E11.59 TYPE 2 DIABETES MELLITUS WITH OTHER CIRCULATORY COMPLICATION, WITH LONG-TERM CURRENT USE OF INSULIN (MULTI): ICD-10-CM

## 2024-07-08 DIAGNOSIS — Z79.4 TYPE 2 DIABETES MELLITUS WITHOUT COMPLICATION, WITH LONG-TERM CURRENT USE OF INSULIN (MULTI): ICD-10-CM

## 2024-07-08 DIAGNOSIS — E87.6 HYPOKALEMIA: ICD-10-CM

## 2024-07-08 RX ORDER — ERGOCALCIFEROL 1.25 MG/1
50000 CAPSULE ORAL
Qty: 6 CAPSULE | Refills: 3 | Status: SHIPPED | OUTPATIENT
Start: 2024-07-08

## 2024-07-08 RX ORDER — DAPAGLIFLOZIN 10 MG/1
10 TABLET, FILM COATED ORAL DAILY
Qty: 90 TABLET | Refills: 3 | Status: SHIPPED | OUTPATIENT
Start: 2024-07-08

## 2024-07-08 RX ORDER — GABAPENTIN 300 MG/1
300 CAPSULE ORAL 2 TIMES DAILY
Qty: 180 CAPSULE | Refills: 3 | Status: SHIPPED | OUTPATIENT
Start: 2024-07-08

## 2024-07-08 RX ORDER — METFORMIN HYDROCHLORIDE 500 MG/1
1000 TABLET, EXTENDED RELEASE ORAL
Qty: 360 TABLET | Refills: 3 | Status: SHIPPED | OUTPATIENT
Start: 2024-07-08

## 2024-07-08 RX ORDER — GLIMEPIRIDE 4 MG/1
4 TABLET ORAL 2 TIMES DAILY
Qty: 180 TABLET | Refills: 3 | Status: SHIPPED | OUTPATIENT
Start: 2024-07-08

## 2024-07-08 RX ORDER — PEN NEEDLE, DIABETIC 30 GX3/16"
1 NEEDLE, DISPOSABLE MISCELLANEOUS 2 TIMES DAILY
Qty: 200 EACH | Refills: 3 | Status: SHIPPED | OUTPATIENT
Start: 2024-07-08

## 2024-07-08 RX ORDER — POTASSIUM CHLORIDE 20 MEQ/1
20 TABLET, EXTENDED RELEASE ORAL DAILY
Qty: 90 TABLET | Refills: 3 | Status: SHIPPED | OUTPATIENT
Start: 2024-07-08

## 2024-07-08 RX ORDER — INSULIN GLARGINE 100 [IU]/ML
INJECTION, SOLUTION SUBCUTANEOUS
Qty: 45 ML | Refills: 3 | Status: SHIPPED | OUTPATIENT
Start: 2024-07-08

## 2024-07-08 RX ORDER — OMEPRAZOLE 40 MG/1
40 CAPSULE, DELAYED RELEASE ORAL DAILY
Qty: 90 CAPSULE | Refills: 3 | Status: SHIPPED | OUTPATIENT
Start: 2024-07-08

## 2024-07-09 DIAGNOSIS — Z79.4 TYPE 2 DIABETES MELLITUS WITH OTHER SPECIFIED COMPLICATION, WITH LONG-TERM CURRENT USE OF INSULIN (MULTI): ICD-10-CM

## 2024-07-09 DIAGNOSIS — E11.69 TYPE 2 DIABETES MELLITUS WITH OTHER SPECIFIED COMPLICATION, WITH LONG-TERM CURRENT USE OF INSULIN (MULTI): ICD-10-CM

## 2024-07-10 ENCOUNTER — OFFICE VISIT (OUTPATIENT)
Dept: PULMONOLOGY | Facility: HOSPITAL | Age: 64
End: 2024-07-10
Payer: COMMERCIAL

## 2024-07-10 VITALS
TEMPERATURE: 97.3 F | WEIGHT: 227.51 LBS | DIASTOLIC BLOOD PRESSURE: 74 MMHG | RESPIRATION RATE: 20 BRPM | SYSTOLIC BLOOD PRESSURE: 129 MMHG | OXYGEN SATURATION: 95 % | HEART RATE: 81 BPM | BODY MASS INDEX: 32.65 KG/M2

## 2024-07-10 DIAGNOSIS — E11.69 TYPE 2 DIABETES MELLITUS WITH OTHER SPECIFIED COMPLICATION, WITH LONG-TERM CURRENT USE OF INSULIN (MULTI): Primary | ICD-10-CM

## 2024-07-10 DIAGNOSIS — R91.1 PULMONARY NODULE: Primary | ICD-10-CM

## 2024-07-10 DIAGNOSIS — Z79.4 TYPE 2 DIABETES MELLITUS WITH OTHER SPECIFIED COMPLICATION, WITH LONG-TERM CURRENT USE OF INSULIN (MULTI): Primary | ICD-10-CM

## 2024-07-10 PROCEDURE — 99214 OFFICE O/P EST MOD 30 MIN: CPT | Performed by: NURSE PRACTITIONER

## 2024-07-10 PROCEDURE — 3074F SYST BP LT 130 MM HG: CPT | Performed by: NURSE PRACTITIONER

## 2024-07-10 PROCEDURE — 3078F DIAST BP <80 MM HG: CPT | Performed by: NURSE PRACTITIONER

## 2024-07-10 PROCEDURE — 3048F LDL-C <100 MG/DL: CPT | Performed by: NURSE PRACTITIONER

## 2024-07-10 PROCEDURE — 3051F HG A1C>EQUAL 7.0%<8.0%: CPT | Performed by: NURSE PRACTITIONER

## 2024-07-10 RX ORDER — TIRZEPATIDE 12.5 MG/.5ML
12.5 INJECTION, SOLUTION SUBCUTANEOUS
Qty: 2 ML | Refills: 0 | Status: SHIPPED | OUTPATIENT
Start: 2024-07-14

## 2024-07-10 RX ORDER — TIRZEPATIDE 10 MG/.5ML
10 INJECTION, SOLUTION SUBCUTANEOUS
Qty: 2 ML | Refills: 0 | OUTPATIENT
Start: 2024-07-14

## 2024-07-10 RX ORDER — TIRZEPATIDE 12.5 MG/.5ML
12.5 INJECTION, SOLUTION SUBCUTANEOUS
Qty: 2 ML | Refills: 0 | Status: SHIPPED | OUTPATIENT
Start: 2024-07-14 | End: 2024-07-10 | Stop reason: ALTCHOICE

## 2024-07-10 ASSESSMENT — ENCOUNTER SYMPTOMS
VOMITING: 0
JOINT SWELLING: 0
WEAKNESS: 0
HEADACHES: 0
VOICE CHANGE: 0
PALPITATIONS: 0
NERVOUS/ANXIOUS: 0
SINUS PRESSURE: 0
DIZZINESS: 0
RHINORRHEA: 0
AGITATION: 0
FATIGUE: 0
EYE PAIN: 0
NAUSEA: 0
FEVER: 0
ABDOMINAL PAIN: 0
BACK PAIN: 0
NUMBNESS: 0
ARTHRALGIAS: 0
MYALGIAS: 0
DIARRHEA: 0

## 2024-07-10 ASSESSMENT — PAIN SCALES - GENERAL: PAINLEVEL: 0-NO PAIN

## 2024-07-10 NOTE — PATIENT INSTRUCTIONS
Lung nodules: nodules stable - VATs biopsy with necrotizing granulomatous inflammation. 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 Nodules stable.   - no further follow up imaging needed     Thank you for visiting the Pulmonary clinic today!   Return to clinic - as needed   Leslye Peres CNP  My office -  [phone]- Margarette is my nurse.   Radiology scheduling [phone]   Appointment scheduling [phone]   Pulmonary function testing - [phone]

## 2024-07-10 NOTE — PROGRESS NOTES
Patient: Pj Clarke    [id number]  : 1960 -- AGE 63 y.o.    Provider: ERIC De Jesus     Location [facility]   Service Date: 7/10/2024              [facility]  Follow up Visit Note      HISTORY OF PRESENT ILLNESS     The patient's referring provider is: Franky Eid MD    HISTORY OF PRESENT ILLNESS   Pj Clarke is a 63 y.o. female who presents to a [facility] for an evaluation with concerns of No chief complaint on file.. I have independently interviewed and examined the patient in the office and reviewed available records.    Current History    On today's visit, the patient reports breathing has been doing well. She has QVAR and albuterol - has not needed for some time. She denies any cough, wheezing, JULIAN, SOB at rest, or CP. She has allergies since she had surgery. She notices when gardening and around pets she will then have symptoms. She denies any CP. She has had RLE swelling - she has had several scans/ tests to work this up. She had right foot swelling prior to this - but it got worse. She states she fell on ice and fell on her knees. She broke her left toes - ended up in a boot then. She was walking uneven related to the boot. She then started to notice sciatica in her right hip. She has seen vascular twice. She has been doing well on mounjaro - feels numbers are doing better.      2023 Dr. Eid -   2023  Ms. CLARKE is a 62 year old female never smoker who presents today for follow-up of lung nodules.  Interval history: Ms. Clarke is doing well and is without any specific complaint.  denies fevers, chills, or nights sweats  denies shortness of breath at rest, or significant dyspnea on exertion,   mMRC Dyspnea Score = 0  denies cough, wheezing, hemoptysis, or stridor  denies chest pain, orthopnea, or lower extremity edema  denies nausea, vomiting, abdominal  pain, odynophagia, dysphagia, heartburn, anorexia, or weight loss  denies sinus congestion, post-nasal drip, epistaxis, or hoarseness of voice   --  Since the last visit there have been no changes to the past medical history, past surgical history, social history, or family history.   --  IInhalers / Nebulized medications / Oxygen: None  Immunizations:  Influenza - 9/22/2022  COVID-19 - February / March 2021, April 2022  Pneumovax - 2018  Prevnar - n/a    Previous pulmonary history: She has no history of recurrent infections, or lung disease as a child.  She had no previous lung hx, never on oxygen or inhaler therapy.   She was previously told she has . She currently is on no supplemental oxygen.  She has never been to pulmonary rehab. Does not recall having AECOPD requiring antibiotics or prednisone.    Inhalers/nebulized medications:     Hospitalization History: She has not been hospitalized over the last year for breathing related problem.    Sleep history:  Denies snoring, apnea, feeling tired during the day or taking naps during the day.   Complains of snoring, apnea, feeling tired during the day, and taking naps during the day.   STOP-BANG score of     ALLERGIES AND MEDICATIONS     ALLERGIES  Allergies   Allergen Reactions    Penicillins Hives and Shortness of breath    Atorvastatin Other     severe leg cramps    Metformin Hcl Diarrhea     Only with HCL brand.   Good with ER       MEDICATIONS  Current Outpatient Medications   Medication Sig Dispense Refill    aspirin 81 mg EC tablet Take 1 tablet (81 mg) by mouth once daily.      Crestor 40 mg tablet Take 1 tablet (40 mg) by mouth once daily. 90 tablet 3    dapagliflozin propanediol (Farxiga) 10 mg Take 1 tablet (10 mg) by mouth once daily. 90 tablet 3    ergocalciferol (Vitamin D-2) 1.25 MG (95408 UT) capsule Take 1 capsule (50,000 Units) by mouth every 14 (fourteen) days. 6 capsule 3    ezetimibe (Zetia) 10 mg tablet Take 1 tablet (10 mg) by mouth once  "daily. 90 tablet 3    furosemide (Lasix) 20 mg tablet Take 1 tablet (20 mg) by mouth once daily. 90 tablet 3    gabapentin (Neurontin) 300 mg capsule Take 1 capsule (300 mg) by mouth 2 times a day. 180 capsule 3    glimepiride (Amaryl) 4 mg tablet Take 1 tablet (4 mg) by mouth 2 times a day. 180 tablet 3    insulin glargine (Basaglar KwikPen U-100 Insulin) 100 unit/mL (3 mL) pen INJECT 25 UNITS UNDER THE SKIN TWO TIMES A DAY. 45 mL 3    metFORMIN  mg 24 hr tablet Take 2 tablets (1,000 mg) by mouth 2 times daily (morning and late afternoon). 360 tablet 3    metoprolol succinate XL (Toprol-XL) 100 mg 24 hr tablet Take 1 tablet (100 mg) by mouth twice a day. 180 tablet 3    omeprazole (PriLOSEC) 40 mg DR capsule Take 1 capsule (40 mg) by mouth once daily. Do not crush or chew 90 capsule 3    pen needle, diabetic (BD Teri 2nd Gen Pen Needle) 32 gauge x 5/32\" needle Inject 1 Units into the skin 2 times a day. 200 each 3    potassium chloride CR 20 mEq ER tablet Take 1 tablet (20 mEq) by mouth once daily. 90 tablet 3    sacubitriL-valsartan (Entresto) 49-51 mg tablet Take 1 tablet by mouth 2 times a day. 180 tablet 3    spironolactone (Aldactone) 25 mg tablet Take 0.5 tablets (12.5 mg) by mouth once daily. 45 tablet 3    ticagrelor (Brilinta) 60 mg tablet Take 1 tablet (60 mg) by mouth 2 times a day. 180 tablet 3    tirzepatide (Mounjaro) 10 mg/0.5 mL pen injector Inject 10 mg under the skin 1 (one) time per week. 2 mL 0    [START ON 7/14/2024] tirzepatide (Mounjaro) 12.5 mg/0.5 mL pen injector Inject 12.5 mg under the skin 1 (one) time per week. 2 mL 0     No current facility-administered medications for this visit.         PAST HISTORY     PAST MEDICAL HISTORY  - HTN   - HFrEF/ICM-- pacemaker/ defib   - CAD - stent - 2017   - HLD   - DMII with associated diabetic neuropathy  - Obesity- on mounjaro   - GERD   - 6/22/21 - L VATS wedge resection - necrotizing granulomatous inflammation   - hysterectomy     PAST " SURGICAL HISTORY  Past Surgical History:   Procedure Laterality Date    BREAST CYST ASPIRATION  when i was a young teenager.    CHOLECYSTECTOMY  06/19/2013    Cholecystectomy    GALLBLADDER SURGERY  05/11/2016    Gallbladder Surgery    HYSTERECTOMY  02/11/2015    Hysterectomy    OTHER SURGICAL HISTORY  06/29/2021    Lung wedge resection    OTHER SURGICAL HISTORY  01/03/2017    Implantable Cardioverter-Defibrillator    TONSILLECTOMY  05/11/2016    Tonsillectomy       IMMUNIZATION HISTORY  Immunization History   Administered Date(s) Administered    Flu vaccine (IIV4), preservative free *Check age/dose* 10/04/2018, 10/23/2020, 09/22/2022, 09/18/2023    Influenza, High Dose Seasonal, Preservative Free 09/22/2021    Influenza, injectable, quadrivalent 09/20/2019    Moderna COVID-19 vaccine, bivalent, blue cap/gray label *Check age/dose* 10/08/2022    Moderna SARS-CoV-2 Vaccination 02/16/2021, 03/16/2021, 11/05/2021, 04/18/2022    Pneumococcal polysaccharide vaccine, 23-valent, age 2 years and older (PNEUMOVAX 23) 10/08/2018    RESPIRATORY SYNCYTIAL VIRUS (RSV), ELIGIBLE PREGNANT PTS, 0.5 ML (ABRYSVO) 10/22/2023    Tdap vaccine, age 7 year and older (BOOSTRIX, ADACEL) 07/02/2019       SOCIAL HISTORY  Smoking: never  Alcohol: none   Illicit drugs:  none     OCCUPATIONAL/ENVIRONMENTAL HISTORY  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      FAMILY HISTORY  FAMILY HISTORY: No family Hx of lung disease or lung cancer.    RESULTS/DATA     Pulmonary Function Test Results     PFTs: 2/18/2020  Spirometry:  FVC = 3.76 L (97% predicted)  FEV1 = 3.04 L (101% predicted)  FEV1/FVC = 81% (predicted lower limit = 67%)  DLCO: 81% predicted  6MWT: 2/18/2020 - RA  SpO2 100% -> 98%  HR 81 -> 85  [place] 2 -> 4  Actual meters walked 320 m / predicted lower limit 304 m        Chest Radiograph     CHEST 2 VIEW 06/30/2021  Impression  1. Interval improvement in bilateral lung aeration with residual  "mild  left basilar atelectasis with or without trace left pleural effusion.      No orders to display        Chest CT Scan       CT chest 11/29/2019 -> LLL nodule (13-14 mm) with questionable satellite nodules   PET/CT 2/12/2020 -> mildly FDG avid LLL nodule, FDG avid left hilar LN  CT chest 06/01/2020 -> interval decreased in size on LLL nodule (10mm) in repeat CT chest   CT Chest 6/16/20241 -> interval increase in size of LLL nodule, new small literal peripheral lung nodules  CT chest 10/6/2021 -> s/p resection of largest LLL nodule, stable nodules  CT chest 8/5/2022 -> stable multiple small bilateral nodules (largest 5 mm), stable post-surgical changes  CT chest 6/28/2023 -> stable multiple small bilateral nodules, stable LLL post-surgical changes    6/28/24 - There are stable multiple subcentimeter parenchymal lung nodules.  No new or enlarging parenchymal lung nodules.  2. Prior wedge resection changes.  3. Artery stent placement and prior apical septal infarction.    Echocardiogram     Echo:   7/19/2019 -> EF 25-30%, multiple wall motion abnormalities, diastolic dysfunction, normal LA / RA / RV, trace MR, trace TR  3/17/2021 -> EF 35%, diastolic dysfunction, multiple wall motion abnormalities, normal LA / RA / RV    6/13/24 -> Left ventricular systolic function is moderately decreased with a 35% estimated ejection fraction.   2. [place], mid and apical anterior wall, mid and apical anterior septum, and apical lateral segment are abnormal.   3. Spectral Doppler shows an impaired relaxation pattern of left ventricular diastolic filling.  RA normal size, RV normal size and function        Other testing/ Labs      Eosinophils Absolute (x10*3/uL)   Date Value   05/30/2024 0.03   01/19/2024 0.07   01/18/2024 0.08     No results found for: \"IGE\"    VATS biopsy - 6/22/21 - NECROTIZING GRANULOMATOUS INFLAMMATION (SEE NOTE).     REVIEW OF SYSTEMS     REVIEW OF SYSTEMS  Review of Systems   Constitutional:  Negative for " fatigue and fever.   HENT:  Negative for congestion, postnasal drip, rhinorrhea, sinus pressure and voice change.    Eyes:  Negative for pain and visual disturbance.   Cardiovascular:  Positive for leg swelling. Negative for chest pain and palpitations.   Gastrointestinal:  Negative for abdominal pain, diarrhea, nausea and vomiting.   Endocrine: Negative for cold intolerance and heat intolerance.   Musculoskeletal:  Negative for arthralgias, back pain, joint swelling and myalgias.   Skin:  Negative for rash.   Neurological:  Negative for dizziness, weakness, numbness and headaches.   Psychiatric/Behavioral:  Negative for agitation. The patient is not nervous/anxious.          PHYSICAL EXAM     VITAL SIGNS: There were no vitals taken for this visit.     CURRENT WEIGHT: [unfilled]  BMI: [unfilled]  PREVIOUS WEIGHTS:  Wt Readings from Last 3 Encounters:   06/13/24 99.8 kg (220 lb)   05/30/24 102 kg (224 lb 8 oz)   04/05/24 103 kg (227 lb)       Physical Exam  Vitals reviewed.   Constitutional:       General: She is not in acute distress.     Appearance: Normal appearance. She is not ill-appearing or toxic-appearing.   HENT:      Head: Normocephalic.      Nose: No rhinorrhea.   Cardiovascular:      Rate and Rhythm: Normal rate and regular rhythm.      Heart sounds: Normal heart sounds.   Pulmonary:      Effort: Pulmonary effort is normal. No respiratory distress.      Breath sounds: Normal breath sounds. No stridor. No wheezing, rhonchi or rales.   Abdominal:      General: Abdomen is flat.   Musculoskeletal:         General: Normal range of motion.      Right lower leg: Edema present.      Left lower leg: No edema.   Skin:     General: Skin is warm and dry.      Nails: There is no clubbing.   Neurological:      General: No focal deficit present.      Mental Status: She is alert and oriented to person, place, and time.   Psychiatric:         Mood and Affect: Mood normal.         Behavior: Behavior normal.         Judgment:  Judgment normal.         ASSESSMENT/PLAN     Lung nodules: nodules stable - VATs biopsy with necrotizing granulomatous inflammation. 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 Nodules stable.   - no further follow up imaging needed     Thank you for visiting the Pulmonary clinic today!   Return to clinic - as needed   Leslye Peres CNP  My office -  [phone]- Margarette is my nurse.   Radiology scheduling [phone]   Appointment scheduling [phone]   Pulmonary function testing - [phone]

## 2024-07-12 ENCOUNTER — PHARMACY VISIT (OUTPATIENT)
Dept: PHARMACY | Facility: CLINIC | Age: 64
End: 2024-07-12
Payer: COMMERCIAL

## 2024-07-12 PROCEDURE — RXMED WILLOW AMBULATORY MEDICATION CHARGE

## 2024-07-23 DIAGNOSIS — K21.9 GASTROESOPHAGEAL REFLUX DISEASE, UNSPECIFIED WHETHER ESOPHAGITIS PRESENT: ICD-10-CM

## 2024-07-25 RX ORDER — OMEPRAZOLE 40 MG/1
40 CAPSULE, DELAYED RELEASE ORAL DAILY
Qty: 90 CAPSULE | Refills: 0 | Status: SHIPPED | OUTPATIENT
Start: 2024-07-25

## 2024-07-26 DIAGNOSIS — E87.6 HYPOKALEMIA: ICD-10-CM

## 2024-07-27 RX ORDER — POTASSIUM CHLORIDE 1500 MG/1
20 TABLET, EXTENDED RELEASE ORAL DAILY
Qty: 90 TABLET | Refills: 0 | Status: SHIPPED | OUTPATIENT
Start: 2024-07-27

## 2024-08-13 ENCOUNTER — APPOINTMENT (OUTPATIENT)
Dept: PRIMARY CARE | Facility: CLINIC | Age: 64
End: 2024-08-13
Payer: COMMERCIAL

## 2024-08-13 ENCOUNTER — LAB (OUTPATIENT)
Dept: LAB | Facility: LAB | Age: 64
End: 2024-08-13
Payer: COMMERCIAL

## 2024-08-13 ENCOUNTER — HOSPITAL ENCOUNTER (OUTPATIENT)
Dept: RADIOLOGY | Facility: CLINIC | Age: 64
Discharge: HOME | End: 2024-08-13
Payer: COMMERCIAL

## 2024-08-13 VITALS
SYSTOLIC BLOOD PRESSURE: 97 MMHG | BODY MASS INDEX: 32.14 KG/M2 | WEIGHT: 224 LBS | HEART RATE: 72 BPM | DIASTOLIC BLOOD PRESSURE: 63 MMHG

## 2024-08-13 DIAGNOSIS — M79.89 SWELLING OF LEFT HAND: Primary | ICD-10-CM

## 2024-08-13 DIAGNOSIS — R76.8 POSITIVE ANA (ANTINUCLEAR ANTIBODY): ICD-10-CM

## 2024-08-13 DIAGNOSIS — M19.049 LOCALIZED OSTEOARTHRITIS OF HAND, UNSPECIFIED LATERALITY: ICD-10-CM

## 2024-08-13 DIAGNOSIS — E11.69 TYPE 2 DIABETES MELLITUS WITH OTHER SPECIFIED COMPLICATION, WITH LONG-TERM CURRENT USE OF INSULIN (MULTI): ICD-10-CM

## 2024-08-13 DIAGNOSIS — Z79.4 TYPE 2 DIABETES MELLITUS WITH OTHER SPECIFIED COMPLICATION, WITH LONG-TERM CURRENT USE OF INSULIN (MULTI): ICD-10-CM

## 2024-08-13 DIAGNOSIS — M79.89 SWELLING OF LEFT HAND: ICD-10-CM

## 2024-08-13 LAB
BASOPHILS # BLD AUTO: 0.03 X10*3/UL (ref 0–0.1)
BASOPHILS NFR BLD AUTO: 0.5 %
CCP IGG SERPL-ACNC: <1 U/ML
CRP SERPL-MCNC: 0.11 MG/DL
EOSINOPHIL # BLD AUTO: 0.06 X10*3/UL (ref 0–0.7)
EOSINOPHIL NFR BLD AUTO: 0.9 %
ERYTHROCYTE [DISTWIDTH] IN BLOOD BY AUTOMATED COUNT: 15.5 % (ref 11.5–14.5)
ERYTHROCYTE [SEDIMENTATION RATE] IN BLOOD BY WESTERGREN METHOD: 9 MM/H (ref 0–30)
HCT VFR BLD AUTO: 45 % (ref 36–46)
HGB BLD-MCNC: 14.6 G/DL (ref 12–16)
IMM GRANULOCYTES # BLD AUTO: 0.02 X10*3/UL (ref 0–0.7)
IMM GRANULOCYTES NFR BLD AUTO: 0.3 % (ref 0–0.9)
LYMPHOCYTES # BLD AUTO: 1.42 X10*3/UL (ref 1.2–4.8)
LYMPHOCYTES NFR BLD AUTO: 21.9 %
MCH RBC QN AUTO: 30 PG (ref 26–34)
MCHC RBC AUTO-ENTMCNC: 32.4 G/DL (ref 32–36)
MCV RBC AUTO: 92 FL (ref 80–100)
MONOCYTES # BLD AUTO: 0.44 X10*3/UL (ref 0.1–1)
MONOCYTES NFR BLD AUTO: 6.8 %
NEUTROPHILS # BLD AUTO: 4.5 X10*3/UL (ref 1.2–7.7)
NEUTROPHILS NFR BLD AUTO: 69.6 %
NRBC BLD-RTO: 0 /100 WBCS (ref 0–0)
PLATELET # BLD AUTO: 264 X10*3/UL (ref 150–450)
RBC # BLD AUTO: 4.87 X10*6/UL (ref 4–5.2)
RHEUMATOID FACT SER NEPH-ACNC: <10 IU/ML (ref 0–15)
WBC # BLD AUTO: 6.5 X10*3/UL (ref 4.4–11.3)

## 2024-08-13 PROCEDURE — 86431 RHEUMATOID FACTOR QUANT: CPT

## 2024-08-13 PROCEDURE — 85025 COMPLETE CBC W/AUTO DIFF WBC: CPT

## 2024-08-13 PROCEDURE — 3048F LDL-C <100 MG/DL: CPT | Performed by: STUDENT IN AN ORGANIZED HEALTH CARE EDUCATION/TRAINING PROGRAM

## 2024-08-13 PROCEDURE — 73130 X-RAY EXAM OF HAND: CPT | Mod: LT

## 2024-08-13 PROCEDURE — 86200 CCP ANTIBODY: CPT

## 2024-08-13 PROCEDURE — 73130 X-RAY EXAM OF HAND: CPT | Mod: LEFT SIDE | Performed by: RADIOLOGY

## 2024-08-13 PROCEDURE — 99214 OFFICE O/P EST MOD 30 MIN: CPT | Performed by: STUDENT IN AN ORGANIZED HEALTH CARE EDUCATION/TRAINING PROGRAM

## 2024-08-13 PROCEDURE — RXMED WILLOW AMBULATORY MEDICATION CHARGE

## 2024-08-13 PROCEDURE — 3074F SYST BP LT 130 MM HG: CPT | Performed by: STUDENT IN AN ORGANIZED HEALTH CARE EDUCATION/TRAINING PROGRAM

## 2024-08-13 PROCEDURE — 85652 RBC SED RATE AUTOMATED: CPT

## 2024-08-13 PROCEDURE — 3078F DIAST BP <80 MM HG: CPT | Performed by: STUDENT IN AN ORGANIZED HEALTH CARE EDUCATION/TRAINING PROGRAM

## 2024-08-13 PROCEDURE — 36415 COLL VENOUS BLD VENIPUNCTURE: CPT

## 2024-08-13 PROCEDURE — 3051F HG A1C>EQUAL 7.0%<8.0%: CPT | Performed by: STUDENT IN AN ORGANIZED HEALTH CARE EDUCATION/TRAINING PROGRAM

## 2024-08-13 PROCEDURE — 86140 C-REACTIVE PROTEIN: CPT

## 2024-08-13 PROCEDURE — 86038 ANTINUCLEAR ANTIBODIES: CPT

## 2024-08-13 NOTE — PROGRESS NOTES
Pj Clarke is a 63 y.o. female seen in Clinic at [facility] by Dr. Sigifredo Webster on 08/13/24 for routine care, as well as for management of the following chronic medical conditions: HFrEF/ICM, CAD, HTN, DLD, T2DM with associated diabetic neuropathy, lung nodules (likely histoplasmosis based on VATS biopsy), Obesity, GERD. Patient presents today for sick visit for joint pain.     Pt messaged via secure chat on 7/25/24 that after starting mounjaro she began having extreme joint pain and swelling in L elbow and MCP joints. At that time Dr. Jackson replied who recommended stopping Mounjaro. Pt reports that since stopping mounjaro 2 wks ago her joint pain and swelling has improved, although is still present in L hand MCP joints. Denies prior hx of autoimmune disease, she was adopted so she does not have family hx although niece has rheumatoid arthritis. Does not take NSAIDs because on DAPT. Pt takes her weight daily and has not had recent weight gain. Does have b/l LE neuropathic pain, and did have some numbness/tingling L hand with this episode that has now resolved. No fever, chills, weight gain/loss, decreased sensation L hand,       Past Medical History: as above   Past Medical History:   Diagnosis Date    Acute ischemic heart disease, unspecified (Multi)     Acute coronary syndrome    Breast cyst had 1 as a teenager.    CHF (congestive heart failure) (Multi)     Dermatochalasis of unspecified eye, unspecified eyelid 11/13/2018    Dermatochalasis    Diabetes mellitus (Multi)     Essential (primary) hypertension 11/16/2022    Benign essential hypertension    Personal history of other mental and behavioral disorders     History of depression    Pure hyperglyceridemia 11/28/2016    Essential hypertriglyceridemia     Subspecialty Medical Care: Cardiology, Vascular Medicine, Podiatry, ENT, Pulmonary     Past Surgical History:   Past Surgical History:   Procedure Laterality Date    BREAST CYST ASPIRATION  when i was a  "young teenager.    CHOLECYSTECTOMY  06/19/2013    Cholecystectomy    GALLBLADDER SURGERY  05/11/2016    Gallbladder Surgery    HYSTERECTOMY  02/11/2015    Hysterectomy    OTHER SURGICAL HISTORY  06/29/2021    Lung wedge resection    OTHER SURGICAL HISTORY  01/03/2017    Implantable Cardioverter-Defibrillator    TONSILLECTOMY  05/11/2016    Tonsillectomy     Medications:  Current Outpatient Medications:     aspirin 81 mg EC tablet, Take 1 tablet (81 mg) by mouth once daily., Disp: , Rfl:     Crestor 40 mg tablet, Take 1 tablet (40 mg) by mouth once daily., Disp: 90 tablet, Rfl: 3    dapagliflozin propanediol (Farxiga) 10 mg, Take 1 tablet (10 mg) by mouth once daily., Disp: 90 tablet, Rfl: 3    ergocalciferol (Vitamin D-2) 1.25 MG (73924 UT) capsule, Take 1 capsule (50,000 Units) by mouth every 14 (fourteen) days., Disp: 6 capsule, Rfl: 3    ezetimibe (Zetia) 10 mg tablet, Take 1 tablet (10 mg) by mouth once daily., Disp: 90 tablet, Rfl: 3    furosemide (Lasix) 20 mg tablet, Take 1 tablet (20 mg) by mouth once daily., Disp: 90 tablet, Rfl: 3    gabapentin (Neurontin) 300 mg capsule, Take 1 capsule (300 mg) by mouth 2 times a day., Disp: 180 capsule, Rfl: 3    glimepiride (Amaryl) 4 mg tablet, Take 1 tablet (4 mg) by mouth 2 times a day., Disp: 180 tablet, Rfl: 3    insulin glargine (Basaglar KwikPen U-100 Insulin) 100 unit/mL (3 mL) pen, INJECT 25 UNITS UNDER THE SKIN TWO TIMES A DAY., Disp: 45 mL, Rfl: 3    metFORMIN  mg 24 hr tablet, Take 2 tablets (1,000 mg) by mouth 2 times daily (morning and late afternoon)., Disp: 360 tablet, Rfl: 3    metoprolol succinate XL (Toprol-XL) 100 mg 24 hr tablet, Take 1 tablet (100 mg) by mouth twice a day., Disp: 180 tablet, Rfl: 3    omeprazole (PriLOSEC) 40 mg DR capsule, Take 1 capsule (40 mg) by mouth once daily. Do not crush or chew, Disp: 90 capsule, Rfl: 0    pen needle, diabetic (BD Teri 2nd Gen Pen Needle) 32 gauge x 5/32\" needle, Inject 1 Units into the skin 2 times " a day., Disp: 200 each, Rfl: 3    potassium chloride CR 20 mEq ER tablet, TAKE ONE TABLET BY MOUTH ONCE DAILY, Disp: 90 tablet, Rfl: 0    sacubitriL-valsartan (Entresto) 49-51 mg tablet, Take 1 tablet by mouth 2 times a day., Disp: 180 tablet, Rfl: 3    spironolactone (Aldactone) 25 mg tablet, Take 0.5 tablets (12.5 mg) by mouth once daily., Disp: 45 tablet, Rfl: 3    ticagrelor (Brilinta) 60 mg tablet, Take 1 tablet (60 mg) by mouth 2 times a day., Disp: 180 tablet, Rfl: 3    tirzepatide (Mounjaro) 10 mg/0.5 mL pen injector, Inject 10 mg under the skin 1 (one) time per week. (Patient not taking: Reported on 7/10/2024), Disp: 2 mL, Rfl: 0    tirzepatide (Mounjaro) 12.5 mg/0.5 mL pen injector, Inject 12.5 mg under the skin 1 (one) time per week., Disp: 2 mL, Rfl: 0  Pharmacy: [facility] ([place])     Allergies:   Allergies   Allergen Reactions    Penicillins Hives and Shortness of breath    Atorvastatin Other     severe leg cramps    Metformin Hcl Diarrhea     Only with HCL brand.   Good with ER     Immunizations:   - Flu annually   - COVID booster--recommend staying UTD  - RSV: completed 10/2023  - Tdap 2019-->due 2029  - PCV/PPSV 23: last 2018; discuss PCV-20 at [facility]  - Shingrix: recommend     Family History:   Family History   Problem Relation Name Age of Onset    Other (Malignant neoplasm) Mother      Other (Malignant neoplasm) Father      Colon cancer Mother's Sister      Other (Family history of diabetes mellitus) Other       Social History:   Home/Living Situation/Falls/Safety Assessment: lives at home with , became critically ill during COVID   Education/Employment/Work/Vocational:   Activities:   Drug Use:   Diet: T2DM, CAD  Depression/Anxiety: prior history   Sexuality/Contraception/Menstrual History:    Sleep:     Patient Information:  Health Insurance: has insurance   Transportation:   Healthcare POA/Guardian:    Contact Information:     Visit Vitals  BP 97/63   Pulse 72   Wt 102 kg  (224 lb)   BMI 32.14 kg/m²   OB Status Hysterectomy   Smoking Status Never   BSA 2.24 m²      PHYSICAL EXAM:   General: well appearing  female in NAD  HEENT: NCAT, MMM, large neck circumference, corrective lenses in place  CV: RRR  PULM: CTAB  ABD: soft, obese, NT, ND  EXT: RLE with ankle/calf swelling and larger circumference compared to LLE, 1+ edema, warm to touch, no erythema or acute trauma, no active skin breakage, no significant pain of calf, diminished R DP pulse   NEURO/PSYCH: A&Ox4, gait impacted by swelling, symmetric facies, appropriate mentation     Assessment/Plan    Pj Clarke is a 63 y.o. female seen in Clinic at [facility] by [place]. Sigifredo Webster on 08/13/24 for routine care, as well as for management of the following chronic medical conditions: HFrEF/ICM, CAD, HTN, DLD, T2DM with associated diabetic neuropathy, Obesity, GERD. Patient presents today for follow up visit.     Summarized plan:  [ ] Labs: LOU w/ reflex, RF, CCP, ESR, CRP  [ ] L hand xray  [ ] Stop mounjaro  [ ] Start ozempic 1mg qwkly w/ pharmacy follow up  [ ] Follow up with vascular medicine Dr. Murray regarding RLE swelling      # L MCP joint pain/swelling  - Pt with new onset L MCP joint pain/swelling which is limiting mobility of MCP joints  - Had L elbow pain as well which has resolved  - L elbow xray 5/2024: normal radiographs of L elbow  - LOU w/ reflex panel 2021: LOU positive titer 1:80, anti-RNP positive 1.0  - CRP 1.00 Jan 2024  [ ] L hand xray  [ ] Labs: LOU w/ reflex, RF, CCP, ESR, CRP    #RLE Swelling  - She was hospitalized in January 2024 after fall with subsequent displaced fracture of distal L hallux and associated cellulitis. She previously was following closely with podiatry after hospitalization with overall improvement in clinical status, boot no longer in place, WBAT.   - In setting of gait being impacted by this, she developed worsening RLE edema and associated pain. This is her third visit for  similar concern this Spring. Prior duplex performed to r/o clot which was negative. To evaluate for PAD, LEA/PVR also ordered with NORMAL results.   - Vascular medicine referral provided (has previously been seen by Dr. Murray for chronic venous insufficiency), has not pursued   - 7/1/24 b/l LE DVT us negative for DVT  - No weight gain to attribute LE swelling to HF  [ ] Compression stockings  [ ] Follow up with Dr. Murray w/ vascular medicine      CHRONIC MEDICAL CONDITIONS: (no discussed today)  #HFrEF, CAD, Cardiomyopathy, HTN, DLD   - follows with cardiology, Dr. Hutchison  - history of NSTEMI (02/2015) and STEMI s/p PCI with JAQUI to LAD (10/2016) with residual ischemic CM (EF 30%) s/p ICD   - current regimen: ASA 81, Brilinta 60mg BID, Crestor 40mg daily, Ezetimibe 10mg at bedtime, Farxiga 10mg, Metoprolol succinate 100mg BID, Entresto 49-51 BID, Furosemide 20mg daily, Spironolactone 12.5mg daily, Potassium 20meq daily  - prior echo 03/2021 with EF 35%, with regional motion wall abnormalities, impaired LV diastolic filling   - last lipid panel 09/2023 NOT at goal (, )  [  ] pending updated echo 6/13/2024   [  ] follow up with Dr. Hutchison and repeat lipid panel; had discussed REPATHA, which she deferred   [  ] consider [facility]   [  ] consider [facility] clinic referral     #T2DM  #Obesity   - last A1C 7.7% in 01/2024  - current regimen: Metformin 1000mg BID, Farxiga 10mg daily, Basaglar 25-30 units (BID), Glimepiride 4mg BID, Mounjaro 10mg weekly (stopped due to joint pain)  [  ] repeat A1C: improved to 7.3%  [  ] pharmacy referral  [  ] ensure following with optho   [ ] switch from mounjaro to ozempic 1mg qwkly    #Diabetic Neuropathy   - Gabapentin 300mg BID (recently uptitrated)     #Chronic Venous Insufficiency with GSV Reflux  - previously seen by vascular medicine    #History of Posterior Tibial Tendonitis      #GERD  - Omeprazole 40mg daily     #Lung Nodules  - follows with pulmonary, last 06/2023  -  CT chest unchanged at that visit, surgical lung biopsy (VATS 2021) showed necrotizing granulomas on pathology but no fungal organisms (still most likely histoplasmosis)  - Repeat CT chest in 1 year (06/2024) -> if remains unchanged at that time, can consider ceasing routine follow-up imaging     #MDD  - last medication over 10 years ago  - remote ECT (2002)       #Health Maintenance    Cancer Screening  - Cervical Cancer Screening: last pap smear/HPV testing: due 2024   - Mammography: last 12/2023 reassuring   - Colorectal Cancer Screening: last 2015; overdue but has deferred per Destiny note; discuss   - Lung Cancer Screening:     Laboratory Screening  - Lipid Screen: CAD, on statin, not at goal   - ASCVD Score: CAD   - A1C, glucose screen: 7.3% in 05/2024   - STI, HIV, Hep B screen:   - Hep C screen:     Imaging Screening  - Osteoporosis/DEXA screening: due at 65     Immunizations:   - Flu annually   - COVID booster--recommend staying UTD  - RSV: completed 10/2023  - Tdap 2019-->due 2029  - PCV/PPSV 23: last 2018; discuss PCV-20 at [facility]  - Shingrix: recommend     Other Screening  - Health Literacy Assessment: excellent   - Depression screen: remote history   - Home safety/partner violence screen: negative   - Hearing/Vision screens: corrective lenses, T2DM, follow with optho  - Alcohol/tobacco/drug use screen:   - Healthcare POA/Advanced Directives:        Patient Discussion:    Please call back the office with any questions at [phone]. In the case of an emergency, please call 911 or go to the nearest Emergency Department.      Hyacinth Easley MD  Internal Medicine PGY-3      Sigifredo Webster MD  Internal Medicine-Pediatrics  [facility] [address], [address]  P: [phone], F: [phone]

## 2024-08-15 ENCOUNTER — PHARMACY VISIT (OUTPATIENT)
Dept: PHARMACY | Facility: CLINIC | Age: 64
End: 2024-08-15
Payer: COMMERCIAL

## 2024-08-20 LAB — ANA SER QL HEP2 SUBST: NEGATIVE

## 2024-08-23 ENCOUNTER — APPOINTMENT (OUTPATIENT)
Dept: PRIMARY CARE | Facility: CLINIC | Age: 64
End: 2024-08-23
Payer: COMMERCIAL

## 2024-09-11 DIAGNOSIS — Z79.4 TYPE 2 DIABETES MELLITUS WITH OTHER SPECIFIED COMPLICATION, WITH LONG-TERM CURRENT USE OF INSULIN (MULTI): ICD-10-CM

## 2024-09-11 DIAGNOSIS — E11.69 TYPE 2 DIABETES MELLITUS WITH OTHER SPECIFIED COMPLICATION, WITH LONG-TERM CURRENT USE OF INSULIN (MULTI): ICD-10-CM

## 2024-09-11 DIAGNOSIS — Z79.4 TYPE 2 DIABETES MELLITUS TREATED WITH INSULIN (MULTI): ICD-10-CM

## 2024-09-11 DIAGNOSIS — E11.59 TYPE 2 DIABETES MELLITUS WITH OTHER CIRCULATORY COMPLICATION, WITH LONG-TERM CURRENT USE OF INSULIN (MULTI): ICD-10-CM

## 2024-09-11 DIAGNOSIS — I25.10 CAD IN NATIVE ARTERY: ICD-10-CM

## 2024-09-11 DIAGNOSIS — Z79.4 TYPE 2 DIABETES MELLITUS WITH OTHER CIRCULATORY COMPLICATION, WITH LONG-TERM CURRENT USE OF INSULIN (MULTI): ICD-10-CM

## 2024-09-11 DIAGNOSIS — M79.606 PAIN OF LOWER EXTREMITY, UNSPECIFIED LATERALITY: ICD-10-CM

## 2024-09-11 DIAGNOSIS — K21.9 GASTROESOPHAGEAL REFLUX DISEASE, UNSPECIFIED WHETHER ESOPHAGITIS PRESENT: ICD-10-CM

## 2024-09-11 DIAGNOSIS — E55.9 MILD VITAMIN D DEFICIENCY: ICD-10-CM

## 2024-09-11 DIAGNOSIS — E11.9 TYPE 2 DIABETES MELLITUS TREATED WITH INSULIN (MULTI): ICD-10-CM

## 2024-09-11 DIAGNOSIS — E78.5 HYPERLIPIDEMIA, UNSPECIFIED HYPERLIPIDEMIA TYPE: Primary | ICD-10-CM

## 2024-09-11 DIAGNOSIS — E78.00 PURE HYPERCHOLESTEROLEMIA: ICD-10-CM

## 2024-09-11 DIAGNOSIS — E87.6 HYPOKALEMIA: ICD-10-CM

## 2024-09-11 RX ORDER — OMEPRAZOLE 40 MG/1
40 CAPSULE, DELAYED RELEASE ORAL DAILY
Qty: 90 CAPSULE | Refills: 3 | Status: SHIPPED | OUTPATIENT
Start: 2024-09-11

## 2024-09-11 RX ORDER — GABAPENTIN 300 MG/1
300 CAPSULE ORAL 2 TIMES DAILY
Qty: 180 CAPSULE | Refills: 3 | Status: SHIPPED | OUTPATIENT
Start: 2024-09-11

## 2024-09-11 RX ORDER — METFORMIN HYDROCHLORIDE 500 MG/1
1000 TABLET, EXTENDED RELEASE ORAL
Qty: 360 TABLET | Refills: 3 | Status: SHIPPED | OUTPATIENT
Start: 2024-09-11

## 2024-09-11 RX ORDER — GLIMEPIRIDE 4 MG/1
4 TABLET ORAL 2 TIMES DAILY
Qty: 180 TABLET | Refills: 3 | Status: SHIPPED | OUTPATIENT
Start: 2024-09-11

## 2024-09-11 RX ORDER — ERGOCALCIFEROL 1.25 MG/1
50000 CAPSULE ORAL
Qty: 6 CAPSULE | Refills: 3 | Status: SHIPPED | OUTPATIENT
Start: 2024-09-11

## 2024-09-11 RX ORDER — DAPAGLIFLOZIN 10 MG/1
10 TABLET, FILM COATED ORAL DAILY
Qty: 90 TABLET | Refills: 3 | Status: SHIPPED | OUTPATIENT
Start: 2024-09-11

## 2024-09-11 RX ORDER — INSULIN GLARGINE 100 [IU]/ML
25 INJECTION, SOLUTION SUBCUTANEOUS NIGHTLY
Qty: 21 ML | Refills: 1 | Status: SHIPPED | OUTPATIENT
Start: 2024-09-11 | End: 2025-02-26

## 2024-09-11 RX ORDER — ROSUVASTATIN CALCIUM 40 MG/1
40 TABLET, FILM COATED ORAL DAILY
Qty: 90 TABLET | Refills: 3 | Status: SHIPPED | OUTPATIENT
Start: 2024-09-11 | End: 2025-09-11

## 2024-09-11 RX ORDER — POTASSIUM CHLORIDE 20 MEQ/1
20 TABLET, EXTENDED RELEASE ORAL DAILY
Qty: 90 TABLET | Refills: 3 | Status: SHIPPED | OUTPATIENT
Start: 2024-09-11

## 2024-09-11 RX ORDER — ROSUVASTATIN CALCIUM 40 MG/1
40 TABLET, COATED ORAL NIGHTLY
Qty: 90 TABLET | Refills: 3 | Status: CANCELLED | OUTPATIENT
Start: 2024-09-11 | End: 2025-09-11

## 2024-09-11 RX ORDER — PEN NEEDLE, DIABETIC 30 GX3/16"
1 NEEDLE, DISPOSABLE MISCELLANEOUS 2 TIMES DAILY
Qty: 200 EACH | Refills: 2 | Status: SHIPPED | OUTPATIENT
Start: 2024-09-11

## 2024-09-12 DIAGNOSIS — Z79.4 TYPE 2 DIABETES MELLITUS WITH OTHER SPECIFIED COMPLICATION, WITH LONG-TERM CURRENT USE OF INSULIN (MULTI): ICD-10-CM

## 2024-09-12 DIAGNOSIS — E11.69 TYPE 2 DIABETES MELLITUS WITH OTHER SPECIFIED COMPLICATION, WITH LONG-TERM CURRENT USE OF INSULIN (MULTI): ICD-10-CM

## 2024-09-24 ENCOUNTER — HOSPITAL ENCOUNTER (OUTPATIENT)
Dept: CARDIOLOGY | Facility: CLINIC | Age: 64
Discharge: HOME | End: 2024-09-24
Payer: COMMERCIAL

## 2024-09-24 DIAGNOSIS — I47.20 VT (VENTRICULAR TACHYCARDIA) (MULTI): ICD-10-CM

## 2024-09-24 PROCEDURE — 93295 DEV INTERROG REMOTE 1/2/MLT: CPT | Performed by: INTERNAL MEDICINE

## 2024-09-24 PROCEDURE — 93296 REM INTERROG EVL PM/IDS: CPT

## 2024-09-26 ENCOUNTER — HOSPITAL ENCOUNTER (OUTPATIENT)
Dept: CARDIOLOGY | Facility: CLINIC | Age: 64
Discharge: HOME | End: 2024-09-26
Payer: COMMERCIAL

## 2024-09-26 DIAGNOSIS — I47.20 VT (VENTRICULAR TACHYCARDIA) (MULTI): ICD-10-CM

## 2024-09-30 ENCOUNTER — APPOINTMENT (OUTPATIENT)
Dept: RADIOLOGY | Facility: HOSPITAL | Age: 64
End: 2024-09-30
Payer: COMMERCIAL

## 2024-09-30 ENCOUNTER — HOSPITAL ENCOUNTER (EMERGENCY)
Facility: HOSPITAL | Age: 64
Discharge: HOME | End: 2024-09-30
Attending: STUDENT IN AN ORGANIZED HEALTH CARE EDUCATION/TRAINING PROGRAM
Payer: COMMERCIAL

## 2024-09-30 VITALS
HEART RATE: 65 BPM | RESPIRATION RATE: 16 BRPM | TEMPERATURE: 98 F | HEIGHT: 63 IN | OXYGEN SATURATION: 100 % | SYSTOLIC BLOOD PRESSURE: 99 MMHG | DIASTOLIC BLOOD PRESSURE: 63 MMHG | WEIGHT: 224 LBS | BODY MASS INDEX: 39.69 KG/M2

## 2024-09-30 DIAGNOSIS — S52.614A CLOSED NONDISPLACED FRACTURE OF STYLOID PROCESS OF RIGHT ULNA, INITIAL ENCOUNTER: ICD-10-CM

## 2024-09-30 DIAGNOSIS — S52.501A CLOSED FRACTURE OF DISTAL END OF RIGHT RADIUS, UNSPECIFIED FRACTURE MORPHOLOGY, INITIAL ENCOUNTER: Primary | ICD-10-CM

## 2024-09-30 PROCEDURE — 72125 CT NECK SPINE W/O DYE: CPT

## 2024-09-30 PROCEDURE — 2500000001 HC RX 250 WO HCPCS SELF ADMINISTERED DRUGS (ALT 637 FOR MEDICARE OP): Performed by: STUDENT IN AN ORGANIZED HEALTH CARE EDUCATION/TRAINING PROGRAM

## 2024-09-30 PROCEDURE — 73110 X-RAY EXAM OF WRIST: CPT | Mod: RT

## 2024-09-30 PROCEDURE — 29125 APPL SHORT ARM SPLINT STATIC: CPT | Mod: 59

## 2024-09-30 PROCEDURE — 99285 EMERGENCY DEPT VISIT HI MDM: CPT | Mod: 25

## 2024-09-30 PROCEDURE — 25605 CLTX DST RDL FX/EPHYS SEP W/: CPT

## 2024-09-30 PROCEDURE — 72125 CT NECK SPINE W/O DYE: CPT | Performed by: RADIOLOGY

## 2024-09-30 PROCEDURE — 73110 X-RAY EXAM OF WRIST: CPT | Mod: RIGHT SIDE | Performed by: RADIOLOGY

## 2024-09-30 PROCEDURE — 70450 CT HEAD/BRAIN W/O DYE: CPT | Performed by: RADIOLOGY

## 2024-09-30 PROCEDURE — 70450 CT HEAD/BRAIN W/O DYE: CPT

## 2024-09-30 RX ORDER — HYDROCODONE BITARTRATE AND ACETAMINOPHEN 5; 325 MG/1; MG/1
1 TABLET ORAL ONCE
Status: COMPLETED | OUTPATIENT
Start: 2024-09-30 | End: 2024-09-30

## 2024-09-30 RX ORDER — HYDROCODONE BITARTRATE AND ACETAMINOPHEN 5; 325 MG/1; MG/1
1 TABLET ORAL EVERY 6 HOURS PRN
Qty: 12 TABLET | Refills: 0 | Status: SHIPPED | OUTPATIENT
Start: 2024-09-30 | End: 2024-09-30

## 2024-09-30 RX ORDER — HYDROCODONE BITARTRATE AND ACETAMINOPHEN 5; 325 MG/1; MG/1
1 TABLET ORAL EVERY 6 HOURS PRN
Qty: 12 TABLET | Refills: 0 | Status: CANCELLED | OUTPATIENT
Start: 2024-09-30 | End: 2024-10-03

## 2024-09-30 RX ORDER — HYDROCODONE BITARTRATE AND ACETAMINOPHEN 5; 325 MG/1; MG/1
1 TABLET ORAL EVERY 6 HOURS PRN
Qty: 12 TABLET | Refills: 0 | Status: SHIPPED | OUTPATIENT
Start: 2024-09-30 | End: 2024-10-03 | Stop reason: SDUPTHER

## 2024-09-30 ASSESSMENT — PAIN SCALES - GENERAL
PAINLEVEL_OUTOF10: 3
PAINLEVEL_OUTOF10: 3
PAINLEVEL_OUTOF10: 5 - MODERATE PAIN
PAINLEVEL_OUTOF10: 2

## 2024-09-30 ASSESSMENT — PAIN DESCRIPTION - DESCRIPTORS: DESCRIPTORS: ACHING

## 2024-09-30 ASSESSMENT — PAIN DESCRIPTION - PAIN TYPE: TYPE: ACUTE PAIN

## 2024-09-30 ASSESSMENT — COLUMBIA-SUICIDE SEVERITY RATING SCALE - C-SSRS
2. HAVE YOU ACTUALLY HAD ANY THOUGHTS OF KILLING YOURSELF?: NO
1. IN THE PAST MONTH, HAVE YOU WISHED YOU WERE DEAD OR WISHED YOU COULD GO TO SLEEP AND NOT WAKE UP?: NO
6. HAVE YOU EVER DONE ANYTHING, STARTED TO DO ANYTHING, OR PREPARED TO DO ANYTHING TO END YOUR LIFE?: NO

## 2024-09-30 ASSESSMENT — PAIN - FUNCTIONAL ASSESSMENT
PAIN_FUNCTIONAL_ASSESSMENT: 0-10
PAIN_FUNCTIONAL_ASSESSMENT: 0-10

## 2024-09-30 ASSESSMENT — PAIN DESCRIPTION - ORIENTATION: ORIENTATION: RIGHT

## 2024-09-30 ASSESSMENT — PAIN DESCRIPTION - LOCATION
LOCATION: WRIST
LOCATION: WRIST

## 2024-09-30 NOTE — DISCHARGE INSTRUCTIONS
You have been seen at a [facility].  Please follow-up with your primary care provider in the next 1 to 2 days for further evaluation and routine follow-up.  Please return to the emergency room if having any worsening symptoms.  Please follow-up with any specialists if discussed during your emergency room stay.

## 2024-09-30 NOTE — ED PROVIDER NOTES
EMERGENCY MEDICINE EVALUATION NOTE    History of Present Illness     Chief Complaint:   Chief Complaint   Patient presents with    Fall       HPI: Pj Clarke is a 63 y.o. female with past medical history of CHF, diabetes, hypertension who presents with complaint of fall.  Patient is prior to arrival she was in her neighbors driveway when she tripped and lost her balance and fell forward.  States she may in the extended her right wrist with a injury and deformity noted to her right wrist.  She does admit possible head trauma and states she is on Brilinta without additional anticoagulant usage.  She denies any loss of consciousness, neck pain, chest pain, shortness of breath, abdominal pain.  Denies any other pain other than her wrist.    Previous History     Past Medical History:   Diagnosis Date    Acute ischemic heart disease, unspecified (Multi)     Acute coronary syndrome    Breast cyst had 1 as a teenager.    CHF (congestive heart failure) (Multi)     Dermatochalasis of unspecified eye, unspecified eyelid 11/13/2018    Dermatochalasis    Diabetes mellitus (Multi)     Essential (primary) hypertension 11/16/2022    Benign essential hypertension    Personal history of other mental and behavioral disorders     History of depression    Pure hyperglyceridemia 11/28/2016    Essential hypertriglyceridemia     Past Surgical History:   Procedure Laterality Date    BREAST CYST ASPIRATION  when i was a young teenager.    CHOLECYSTECTOMY  06/19/2013    Cholecystectomy    GALLBLADDER SURGERY  05/11/2016    Gallbladder Surgery    HYSTERECTOMY  02/11/2015    Hysterectomy    OTHER SURGICAL HISTORY  06/29/2021    Lung wedge resection    OTHER SURGICAL HISTORY  01/03/2017    Implantable Cardioverter-Defibrillator    TONSILLECTOMY  05/11/2016    Tonsillectomy     Social History     Tobacco Use    Smoking status: Never     Passive exposure: Never    Smokeless tobacco: Never   Substance Use Topics    Alcohol use: Yes      "Comment: rarely    Drug use: Not Currently     Family History   Problem Relation Name Age of Onset    Other (Malignant neoplasm) Mother      Other (Malignant neoplasm) Father      Colon cancer Mother's Sister      Other (Family history of diabetes mellitus) Other       Allergies   Allergen Reactions    Penicillins Hives and Shortness of breath    Atorvastatin Other     severe leg cramps    Metformin Hcl Diarrhea     Only with HCL brand.   Good with ER     Current Outpatient Medications   Medication Instructions    aspirin 81 mg, oral, Daily    Crestor 40 mg, oral, Daily    dapagliflozin propanediol (FARXIGA) 10 mg, oral, Daily    ergocalciferol (VITAMIN D-2) 50,000 Units, oral, Every 14 days    ezetimibe (ZETIA) 10 mg, oral, Daily    furosemide (LASIX) 20 mg, oral, Daily    gabapentin (NEURONTIN) 300 mg, oral, 2 times daily    glimepiride (AMARYL) 4 mg, oral, 2 times daily    HYDROcodone-acetaminophen (Norco) 5-325 mg tablet 1 tablet, oral, Every 6 hours PRN    insulin glargine (LANTUS) 25 Units, subcutaneous, Nightly, Take as directed per insulin instructions.    metFORMIN XR (GLUCOPHAGE-XR) 1,000 mg, oral, 2 times daily (morning and late afternoon)    metoprolol succinate XL (TOPROL-XL) 100 mg, oral, Twice a day (mid-day and evening)    Mounjaro 12.5 mg, subcutaneous, Once Weekly    omeprazole (PRILOSEC) 40 mg, oral, Daily, Do not crush or chew    pen needle, diabetic (BD Ultra-Fine Short Pen Needle) 31 gauge x 5/16\" needle 1 each, intradermal, 2 times daily    potassium chloride CR 20 mEq ER tablet 20 mEq, oral, Daily    sacubitriL-valsartan (Entresto) 49-51 mg tablet 1 tablet, oral, 2 times daily    semaglutide (OZEMPIC) 1 mg, subcutaneous, Once Weekly    spironolactone (ALDACTONE) 12.5 mg, oral, Daily    ticagrelor (BRILINTA) 60 mg, oral, 2 times daily       Physical Exam     Appearance: Alert, oriented , cooperative,  in acute distress. Well nourished & well hydrated.     Skin: Intact,  dry skin, no lesions, " rash, petechiae or purpura.      Eyes: PERRLA, EOMs intact,  Conjunctiva pink with no redness or exudates. Cornea & anterior chamber are clear, Eyelids without lesions. No scleral icterus.      ENT: Hearing grossly intact. External auditory canals patent, tympanic membranes intact with visible landmarks. Nares patent, mucus membranes moist. Dentition without lesions. Pharynx clear, uvula midline.      Neck: Supple, without meningismus. Thyroid not palpable. Trachea at midline. No lymphadenopathy.     Pulmonary: Clear bilaterally with good chest wall excursion. No rales, rhonchi or wheezing. No accessory muscle use or stridor.     Cardiac: Normal S1, S2 without murmur, rub, gallop or extrasystole. No JVD, Carotids without bruits.     Abdomen: Soft, nontender, active bowel sounds.  No palpable organomegaly.  No rebound or guarding.  No CVA tenderness.     Genitourinary: Exam deferred.     Musculoskeletal: Reduced active motion of the right wrist with obvious deformity and tenderness throughout as well as ecchymosis and mild scattered abrasions.  Pulses full and equal. No cyanosis or clubbing.      Neurological:  Cranial nerves II through XII are grossly intact, finger-nose touch is normal, normal sensation, no weakness, no focal findings identified.     Psychiatric: Appropriate mood and affect.      Results   Labs Reviewed - No data to display  XR wrist right 3+ views   Final Result   Impaction fracture of the distal right radius with displacement and   volar angulation of the distal right radius fragment.        MACRO:   None        Signed by: Salvatore Singleton 9/30/2024 1:39 PM   Dictation workstation:   [id number]      XR wrist right 3+ views   Final Result   Comminuted displaced intra-articular fracture of the distal radius.        MACRO:   None.        Signed by: Evelyn Landeros 9/30/2024 11:16 AM   Dictation workstation:   [id number]      CT head wo IV contrast   Final Result   Unremarkable exam.        Signed  "by: José Vanegas 9/30/2024 10:43 AM   Dictation workstation:   [id number]      CT cervical spine wo IV contrast   Final Result   No fracture identified.   Spondylosis C5-6 with mild anterolisthesis and moderate-severe   stenosis right neural foramen.        Signed by: José Vanegas 9/30/2024 10:45 AM   Dictation workstation:   [id number]            ED Course & Medical Decision Making     Medications   diphth,pertus(acell),tetanus (BoostRIX) 2.5-8-5 Lf-mcg-Lf/0.5mL vaccine 0.5 mL (has no administration in time range)   HYDROcodone-acetaminophen (Norco) 5-325 mg per tablet 1 tablet (1 tablet oral Given 9/30/24 1110)     Diagnoses as of 09/30/24 1441   Closed fracture of distal end of right radius, unspecified fracture morphology, initial encounter   Closed nondisplaced fracture of styloid process of right ulna, initial encounter     Heart Rate:  [65-76]   Temperature:  [36.7 °C (98 °F)]   Respirations:  [18]   BP: ()/(63-64)   Height:  [160 cm (5' 3\")]   Weight:  [102 kg (224 lb)]   Pulse Ox:  [96 %-100 %]      Patient did have a mechanical fall prior to arrival.  She does have reported head trauma although obvious injuries other than a deformity to the right wrist with tenderness throughout concerning for possible acute osseous abnormality and wrist fracture.  She was given a Tdap update vaccination as well as Norco for pain control did have significant relief.  CT scan of the head and cervical spine were otherwise nonacute.  X-rays of the right wrist did show a comminuted distal radius fracture with articular extension and a step-off deformity with volar displacement of the main distal fracture fragment.  Also oblique nondisplaced fracture of the ulnar styloid process.  I did consult the orthopedic surgery resident who reviewed the images and I did perform a hematoma block and attempted to reduce the distal radius fracture fragment.  Given the impaction and complicated fracture I was unable to fully " reduce the fracture fragment.  She remained neurovascularly intact.  She did wish to be discharged home at this time and does reportedly have a orthopedic hand surgeon that she follows up with.  She was given a referral to utilize as an outpatient and was informed follow-up with orthopedic surgery as soon as possible for additional management.  She was also given 3 days of Norco for pain control.    Procedures   Orthopaedic Injury Treatment - Upper Extremity    Performed by: Harpreet Salazar DO  Authorized by: Harpreet Salazar DO    Consent:     Consent obtained:  Verbal    Consent given by:  Patient    Risks, benefits, and alternatives were discussed: yes      Risks discussed:  Fracture, irreducible dislocation, nerve damage, recurrent dislocation and restricted joint movement    Alternatives discussed:  No treatment  Universal protocol:     Procedure explained and questions answered to patient or proxy's satisfaction: yes      Relevant documents present and verified: yes      Test results available: yes      Imaging studies available: yes      Required blood products, implants, devices, and special equipment available: yes      Site/side marked: yes      Immediately prior to procedure, a time out was called: yes      Patient identity confirmed:  Verbally with patient  Location:     Location:  Wrist    Wrist location:  R wrist    Wrist dislocation type: radiocarpal    Pre-procedure details:     Pre-procedure imaging:  X-ray    Imaging findings: dislocation present and fracture present      Distal perfusion: normal    Sedation:     Sedation type:  None  Anesthesia:     Anesthesia method:  Nerve block    Block location:  Hematoma block    Block needle gauge:  27 G    Block anesthetic:  Lidocaine 1% w/o epi    Block technique:  Injected into fracture area    Block injection procedure:  Anatomic landmarks identified    Block outcome:  Anesthesia achieved  Procedure details:     Manipulation performed: yes      Wrist  reduction method:  Direct traction and traction and counter traction    Reduction successful: no      Reduction confirmed with imaging: yes      Immobilization:  Splint    Splint type:  Sugar tong    Supplies used:  Prefabricated splint  Post-procedure details:     Neurological function: normal      Distal perfusion: normal      Range of motion: normal      Procedure completion:  Tolerated      Diagnosis     1. Closed fracture of distal end of right radius, unspecified fracture morphology, initial encounter        Disposition     DISCHARGE.  The patient is discharged back to their place of residence.  Discharge diagnosis, instructions and plan were discussed and understood. At the time of discharge the patient was comfortable and was in no apparent distress. Patient is aware of diagnostic uncertainty and was notified though testing is negative here, there is a very small chance that pathology may be missed.  The patient understands these risks and the patient /family understood to return immediately to the emergency department if the symptoms worsen or if they have any additional concerns.    FOLLOW UP  Primary care provider in 1-2 days.        ED Prescriptions       Medication Sig Dispense Start Date End Date Auth. Provider    HYDROcodone-acetaminophen (Norco) 5-325 mg tablet Take 1 tablet by mouth every 6 hours if needed for moderate pain (4 - 6) for up to 3 days. 12 tablet 9/30/2024 10/3/2024 DO Harpreet Ibrahim,   09/30/24 1446

## 2024-09-30 NOTE — ED TRIAGE NOTES
Pt here by EMS post fall; pt reports she was outside walking when she tripped over a crack on concrete causing her to fall; pt braced herself with her right hand and fell forward, denies hitting head but does have eraser size abrasion next to right eyebrow, denies LOC able to ambulate after fall with assistance, +thinners on brilinta for  cardiac hx; +deformity/swelling to right wrist with small cut to lateral aspect of wrist bleeding is controlled, pulses/sensation intact to wrist, able to move all digits on right hand; reports tetanus shot updated last year     Denies sob/dizziness/cp/visual issues/neck/back pain prior/post to fall   A&ox4 pt given ice upon arrival to ER;

## 2024-10-03 ENCOUNTER — OFFICE VISIT (OUTPATIENT)
Dept: ORTHOPEDIC SURGERY | Facility: CLINIC | Age: 64
End: 2024-10-03
Payer: COMMERCIAL

## 2024-10-03 VITALS — BODY MASS INDEX: 39.69 KG/M2 | WEIGHT: 224 LBS | HEIGHT: 63 IN

## 2024-10-03 DIAGNOSIS — S52.501A CLOSED FRACTURE OF DISTAL END OF RIGHT RADIUS, UNSPECIFIED FRACTURE MORPHOLOGY, INITIAL ENCOUNTER: Primary | ICD-10-CM

## 2024-10-03 PROCEDURE — 3051F HG A1C>EQUAL 7.0%<8.0%: CPT | Performed by: ORTHOPAEDIC SURGERY

## 2024-10-03 PROCEDURE — 3048F LDL-C <100 MG/DL: CPT | Performed by: ORTHOPAEDIC SURGERY

## 2024-10-03 PROCEDURE — 3008F BODY MASS INDEX DOCD: CPT | Performed by: ORTHOPAEDIC SURGERY

## 2024-10-03 PROCEDURE — 99214 OFFICE O/P EST MOD 30 MIN: CPT | Performed by: ORTHOPAEDIC SURGERY

## 2024-10-03 PROCEDURE — 1036F TOBACCO NON-USER: CPT | Performed by: ORTHOPAEDIC SURGERY

## 2024-10-03 PROCEDURE — 99204 OFFICE O/P NEW MOD 45 MIN: CPT | Performed by: ORTHOPAEDIC SURGERY

## 2024-10-03 RX ORDER — HYDROCODONE BITARTRATE AND ACETAMINOPHEN 5; 325 MG/1; MG/1
1 TABLET ORAL EVERY 6 HOURS PRN
Qty: 28 TABLET | Refills: 0 | Status: SHIPPED | OUTPATIENT
Start: 2024-10-03 | End: 2024-10-10

## 2024-10-03 ASSESSMENT — PAIN DESCRIPTION - DESCRIPTORS: DESCRIPTORS: ACHING;SORE

## 2024-10-03 ASSESSMENT — PAIN SCALES - GENERAL: PAINLEVEL_OUTOF10: 4

## 2024-10-03 ASSESSMENT — PAIN - FUNCTIONAL ASSESSMENT: PAIN_FUNCTIONAL_ASSESSMENT: 0-10

## 2024-10-03 NOTE — PROGRESS NOTES
[facility]  Hand and Upper Extremity Service  Follow up visit         Follow up for: Right wrist     Interval History: She follows up for her right wrist. She presents for emergency room follow up. She fell on 9/31 and sustained a comminuted unstable interarticular fracture of the right distal radius. She was placed into a sugar-tong splint. She was taken care of in 2020 for a nondisplaced fracture of the same wrist that was treated nonoperatively with good outcomes. She has been taking hydrocodone 325 mg for her pain sparingly. She has multiple medical issues including diabetes, heart disease, and anti-coagulation.               Past medical history, medications, allergies, surgical history and review of systems are reviewed and otherwise unchanged when compared to last visit on 6/10/21         Examination:  Constitutional: Oriented to person, place, and time.  Appears well-developed and well-nourished.  Head: Normocephalic and atraumatic.  Eyes: Pupils are equal, round, and reactive to light.  Cardiovascular: Intact distal pulses.  Pulmonary/Chest/Breast: Effort normal. No respiratory distress.  Neurological: Alert and oriented to person, place, and time.  Skin: Skin is warm and dry.  Psychiatric: normal mood and affect.  Behavior is normal.  Musculoskeletal: Right hand reveals she's in a sugar-tong splint that extends past the MP joints of her fingers. Her fingers are swollen but warm, profuse, and sensate and able to display decent digital range of motion.       Personal Interpretation of Diagnostic studies: X-rays of right wrist taken today demonstrate volarly unstable intraarticular fracture of the right distal radius.        Impression: Unstable right wrist fracture       Plan: We've reviewed the x-rays and discussed that this is a very unstable fracture pattern and that surgery is recommended. I have already communicated with her cardiologist and primary care physician  indicating that she is at acceptable risk for surgery and that she can hold her Brillinta 60 mg 5 days prior to surgery. I've also communicated with our anesthesia department who indicated this is a surgery that should be done at a hospital because of her comorbidities. We've made arrangements for her to have surgery for this problem on 10/11 at our main campus. She'll take her Brillinta for the weekend and stop but she can continue with her Asprin up until surgery. We'll get her scheduled for ORIF right distal radius fracture. I've also refilled her Norco 325 mg for her to take as needed.       Medications Prescribed: Norco 325 mg        Follow up: As scheduled for surgery     We discussed risks, benefits, alternatives and anticipated post op course including time away from work and activities following surgical treatment for the patient's condition. This is major surgery with identifiable risks. No guarantees for success have been provided. The patient has expressed understanding and has elected to pursue operative treatment.                Donis Perez MD  [facility]  Department of Orthopaedic Surgery  Hand and Upper Extremity Reconstruction    Scribe Attestation  By signing my name below, I, Jeramie Coyle , Seb   attest that this documentation has been prepared under the direction and in the presence of Dr. Donis Perez.    Dictation performed with the use of voice recognition software.  Syntax and grammatical errors may exist.

## 2024-10-03 NOTE — LETTER
October 3, 2024     Sigifredo Webster MD  [address]  [facility], [facility] [address]  [facility] [address]    Patient: Pj Clarke   YOB: 1960   Date of Visit: 10/3/2024       Dear Dr. Sigifredo Webster MD:    Thank you for referring Pj Clarke to me for evaluation. Below are my notes for this consultation.  If you have questions, please do not hesitate to call me. I look forward to following your patient along with you.       Sincerely,     Donis Perez MD      CC: No Recipients  ______________________________________________________________________________________    [facility]  Hand and Upper Extremity Service  Follow up visit         Follow up for: Right wrist     Interval History: She follows up for her right wrist. She presents for emergency room follow up. She fell on 9/31 and sustained a comminuted unstable interarticular fracture of the right distal radius. She was placed into a sugar-tong splint. She was taken care of in 2020 for a nondisplaced fracture of the same wrist that was treated nonoperatively with good outcomes. She has been taking hydrocodone 325 mg for her pain sparingly. She has multiple medical issues including diabetes, heart disease, and anti-coagulation.               Past medical history, medications, allergies, surgical history and review of systems are reviewed and otherwise unchanged when compared to last visit on 6/10/21         Examination:  Constitutional: Oriented to person, place, and time.  Appears well-developed and well-nourished.  Head: Normocephalic and atraumatic.  Eyes: Pupils are equal, round, and reactive to light.  Cardiovascular: Intact distal pulses.  Pulmonary/Chest/Breast: Effort normal. No respiratory distress.  Neurological: Alert and oriented to person, place, and time.  Skin: Skin is warm and dry.  Psychiatric: normal mood and affect.  Behavior is normal.  Musculoskeletal: Right  hand reveals she's in a sugar-tong splint that extends past the MP joints of her fingers. Her fingers are swollen but warm, profuse, and sensate and able to display decent digital range of motion.       Personal Interpretation of Diagnostic studies: X-rays of right wrist taken today demonstrate volarly unstable intraarticular fracture of the right distal radius.        Impression: Unstable right wrist fracture       Plan: We've reviewed the x-rays and discussed that this is a very unstable fracture pattern and that surgery is recommended. I have already communicated with her cardiologist and primary care physician indicating that she is at acceptable risk for surgery and that she can hold her Brillinta 60 mg 5 days prior to surgery. I've also communicated with our anesthesia department who indicated this is a surgery that should be done at a hospital because of her comorbidities. We've made arrangements for her to have surgery for this problem on 10/11 at our main campus. She'll take her Brillinta for the weekend and stop but she can continue with her Asprin up until surgery. We'll get her scheduled for ORIF right distal radius fracture. I've also refilled her Norco 325 mg for her to take as needed.       Medications Prescribed: Norco 325 mg        Follow up: As scheduled for surgery     We discussed risks, benefits, alternatives and anticipated post op course including time away from work and activities following surgical treatment for the patient's condition. This is major surgery with identifiable risks. No guarantees for success have been provided. The patient has expressed understanding and has elected to pursue operative treatment.                Donis Perez MD  [facility]  Department of Orthopaedic Surgery  Hand and Upper Extremity Reconstruction    Scribe Attestation  By signing my name below, IJeramie Scribe   attest that this documentation has been prepared  under the direction and in the presence of Dr. Donis Perez.    Dictation performed with the use of voice recognition software.  Syntax and grammatical errors may exist.

## 2024-10-08 ENCOUNTER — PRE-ADMISSION TESTING (OUTPATIENT)
Dept: PREADMISSION TESTING | Facility: HOSPITAL | Age: 64
End: 2024-10-08
Payer: MEDICARE

## 2024-10-08 VITALS
OXYGEN SATURATION: 97 % | TEMPERATURE: 96.9 F | HEIGHT: 70 IN | BODY MASS INDEX: 31.24 KG/M2 | DIASTOLIC BLOOD PRESSURE: 67 MMHG | HEART RATE: 73 BPM | SYSTOLIC BLOOD PRESSURE: 102 MMHG | WEIGHT: 218.2 LBS

## 2024-10-08 DIAGNOSIS — S52.501A CLOSED FRACTURE OF DISTAL END OF RIGHT RADIUS, UNSPECIFIED FRACTURE MORPHOLOGY, INITIAL ENCOUNTER: ICD-10-CM

## 2024-10-08 DIAGNOSIS — Z79.4 TYPE 2 DIABETES MELLITUS WITHOUT COMPLICATION, WITH LONG-TERM CURRENT USE OF INSULIN (MULTI): ICD-10-CM

## 2024-10-08 DIAGNOSIS — E11.9 TYPE 2 DIABETES MELLITUS WITHOUT COMPLICATION, WITH LONG-TERM CURRENT USE OF INSULIN (MULTI): ICD-10-CM

## 2024-10-08 LAB
ABO GROUP (TYPE) IN BLOOD: NORMAL
ANION GAP SERPL CALC-SCNC: 16 MMOL/L (ref 10–20)
ANTIBODY SCREEN: NORMAL
BUN SERPL-MCNC: 26 MG/DL (ref 6–23)
CALCIUM SERPL-MCNC: 9.8 MG/DL (ref 8.6–10.6)
CHLORIDE SERPL-SCNC: 106 MMOL/L (ref 98–107)
CO2 SERPL-SCNC: 22 MMOL/L (ref 21–32)
CREAT SERPL-MCNC: 0.87 MG/DL (ref 0.5–1.05)
EGFRCR SERPLBLD CKD-EPI 2021: 75 ML/MIN/1.73M*2
ERYTHROCYTE [DISTWIDTH] IN BLOOD BY AUTOMATED COUNT: 14.4 % (ref 11.5–14.5)
EST. AVERAGE GLUCOSE BLD GHB EST-MCNC: 200 MG/DL
GLUCOSE SERPL-MCNC: 176 MG/DL (ref 74–99)
HBA1C MFR BLD: 8.6 %
HCT VFR BLD AUTO: 41.4 % (ref 36–46)
HGB BLD-MCNC: 13.1 G/DL (ref 12–16)
MCH RBC QN AUTO: 29.8 PG (ref 26–34)
MCHC RBC AUTO-ENTMCNC: 31.6 G/DL (ref 32–36)
MCV RBC AUTO: 94 FL (ref 80–100)
NRBC BLD-RTO: 0 /100 WBCS (ref 0–0)
PLATELET # BLD AUTO: 261 X10*3/UL (ref 150–450)
POTASSIUM SERPL-SCNC: 4.7 MMOL/L (ref 3.5–5.3)
RBC # BLD AUTO: 4.4 X10*6/UL (ref 4–5.2)
RH FACTOR (ANTIGEN D): NORMAL
SODIUM SERPL-SCNC: 139 MMOL/L (ref 136–145)
WBC # BLD AUTO: 6.8 X10*3/UL (ref 4.4–11.3)

## 2024-10-08 PROCEDURE — 82374 ASSAY BLOOD CARBON DIOXIDE: CPT

## 2024-10-08 PROCEDURE — 99205 OFFICE O/P NEW HI 60 MIN: CPT | Performed by: NURSE ANESTHETIST, CERTIFIED REGISTERED

## 2024-10-08 PROCEDURE — 85027 COMPLETE CBC AUTOMATED: CPT

## 2024-10-08 PROCEDURE — 86900 BLOOD TYPING SEROLOGIC ABO: CPT

## 2024-10-08 PROCEDURE — 83036 HEMOGLOBIN GLYCOSYLATED A1C: CPT

## 2024-10-08 PROCEDURE — 36415 COLL VENOUS BLD VENIPUNCTURE: CPT

## 2024-10-08 ASSESSMENT — ENCOUNTER SYMPTOMS
BRUISES/BLEEDS EASILY: 1
NECK NEGATIVE: 1
RESPIRATORY NEGATIVE: 1
ENDOCRINE NEGATIVE: 1
LIMITED RANGE OF MOTION: 1
GASTROINTESTINAL NEGATIVE: 1
CONSTITUTIONAL NEGATIVE: 1

## 2024-10-08 ASSESSMENT — DUKE ACTIVITY SCORE INDEX (DASI)
CAN YOU WALK INDOORS, SUCH AS AROUND YOUR HOUSE: YES
CAN YOU DO YARD WORK LIKE RAKING LEAVES, WEEDING OR PUSHING A MOWER: YES
CAN YOU TAKE CARE OF YOURSELF (EAT, DRESS, BATHE, OR USE TOILET): YES
CAN YOU DO LIGHT WORK AROUND THE HOUSE LIKE DUSTING OR WASHING DISHES: YES
CAN YOU PARTICIPATE IN STRENOUS SPORTS LIKE SWIMMING, SINGLES TENNIS, FOOTBALL, BASKETBALL, OR SKIING: YES
TOTAL_SCORE: 50.2
CAN YOU RUN A SHORT DISTANCE: YES
CAN YOU WALK A BLOCK OR TWO ON LEVEL GROUND: YES
CAN YOU PARTICIPATE IN MODERATE RECREATIONAL ACTIVITIES LIKE GOLF, BOWLING, DANCING, DOUBLES TENNIS OR THROWING A BASEBALL OR FOOTBALL: YES
CAN YOU DO MODERATE WORK AROUND THE HOUSE LIKE VACUUMING, SWEEPING FLOORS OR CARRYING GROCERIES: YES
CAN YOU HAVE SEXUAL RELATIONS: YES
CAN YOU DO HEAVY WORK AROUND THE HOUSE LIKE SCRUBBING FLOORS OR LIFTING AND MOVING HEAVY FURNITURE: NO
CAN YOU CLIMB A FLIGHT OF STAIRS OR WALK UP A HILL: YES
DASI METS SCORE: 8.9

## 2024-10-08 ASSESSMENT — ACTIVITIES OF DAILY LIVING (ADL): ADL_SCORE: 0

## 2024-10-08 ASSESSMENT — LIFESTYLE VARIABLES: SMOKING_STATUS: NONSMOKER

## 2024-10-08 NOTE — CPM/PAT H&P
CPM/PAT Evaluation       Name: Pj Clarke (Pj Clarke)  /Age: 1960/63 y.o.     Visit Type:   In-Person         Past Medical History:   Diagnosis Date    Acute ischemic heart disease, unspecified     Acute coronary syndrome    Breast cyst had 1 as a teenager.    CHF (congestive heart failure)     Chronic pain disorder     Coronary artery disease     1 stent    Dermatochalasis of unspecified eye, unspecified eyelid 2018    Dermatochalasis    Diabetes mellitus (Multi)     Essential (primary) hypertension 2022    Benign essential hypertension    Fractures     GERD (gastroesophageal reflux disease)     Peripheral neuropathy     Personal history of other mental and behavioral disorders     History of depression    Pure hyperglyceridemia 2016    Essential hypertriglyceridemia    Type 2 diabetes mellitus        Past Surgical History:   Procedure Laterality Date    BREAST CYST ASPIRATION  when i was a young teenager.    CHOLECYSTECTOMY  2013    Cholecystectomy    GALLBLADDER SURGERY  2016    Gallbladder Surgery    HYSTERECTOMY  2015    Hysterectomy    OTHER SURGICAL HISTORY Left 2021    Lung wedge resection    OTHER SURGICAL HISTORY  2017    Implantable Cardioverter-Defibrillator    TONSILLECTOMY  2016    Tonsillectomy       Patient  has no history on file for sexual activity.    Family History   Problem Relation Name Age of Onset    Other (Malignant neoplasm) Mother      Other (Malignant neoplasm) Father      Colon cancer Mother's Sister      Other (Family history of diabetes mellitus) Other         Allergies   Allergen Reactions    Penicillins Hives and Shortness of breath    Atorvastatin Other     severe leg cramps    Metformin Hcl Diarrhea     Only with HCL brand.   Good with ER       Prior to Admission medications    Medication Sig Start Date End Date Taking? Authorizing Provider   aspirin 81 mg EC tablet Take 1 tablet (81 mg) by mouth once  "daily.   Yes Marilyn Jeffery, MD   Crestor 40 mg tablet Take 1 tablet (40 mg) by mouth once daily. 9/11/24 9/11/25 Yes Elías Hutchison MD   dapagliflozin propanediol (Farxiga) 10 mg Take 1 tablet (10 mg) by mouth once daily. 9/11/24  Yes Sigifredo Webster MD   ergocalciferol (Vitamin D-2) 1.25 MG (92871 UT) capsule Take 1 capsule (50,000 Units) by mouth every 14 (fourteen) days. 9/11/24  Yes Sigifredo Webster MD   ezetimibe (Zetia) 10 mg tablet Take 1 tablet (10 mg) by mouth once daily. 6/24/24 6/24/25 Yes Elías Hutchison MD   furosemide (Lasix) 20 mg tablet Take 1 tablet (20 mg) by mouth once daily. 6/24/24 6/24/25 Yes Elías Hutchison MD   gabapentin (Neurontin) 300 mg capsule Take 1 capsule (300 mg) by mouth 2 times a day. 9/11/24  Yes Sigifredo Webster MD   glimepiride (Amaryl) 4 mg tablet Take 1 tablet (4 mg) by mouth 2 times a day. 9/11/24  Yes Sigifredo Webster MD   HYDROcodone-acetaminophen (Norco) 5-325 mg tablet Take 1 tablet by mouth every 6 hours if needed for moderate pain (4 - 6) for up to 7 days. 10/3/24 10/10/24 Yes Donis Perez MD   insulin glargine (Lantus) 100 unit/mL (3 mL) pen Inject 25 Units under the skin once daily at bedtime. Take as directed per insulin instructions. 9/11/24 2/26/25 Yes Sigifredo Webster MD   metFORMIN  mg 24 hr tablet Take 2 tablets (1,000 mg) by mouth 2 times daily (morning and late afternoon). 9/11/24  Yes Sigifredo Webster MD   metoprolol succinate XL (Toprol-XL) 100 mg 24 hr tablet Take 1 tablet (100 mg) by mouth twice a day. 6/24/24 6/24/25 Yes Elías Hutchison MD   omeprazole (PriLOSEC) 40 mg DR capsule Take 1 capsule (40 mg) by mouth once daily. Do not crush or chew 9/11/24  Yes Sigifredo Webster MD   pen needle, diabetic (BD Ultra-Fine Short Pen Needle) 31 gauge x 5/16\" needle Inject 1 each into the skin 2 times a day. 9/11/24  Yes Sigifredo Webster MD   potassium chloride CR 20 mEq ER tablet Take 1 tablet (20 mEq) by mouth once " daily. 9/11/24  Yes Sigifredo Webster MD   sacubitriL-valsartan (Entresto) 49-51 mg tablet Take 1 tablet by mouth 2 times a day. 6/24/24 6/24/25 Yes Elías Hutchison MD   spironolactone (Aldactone) 25 mg tablet Take 0.5 tablets (12.5 mg) by mouth once daily. 6/24/24 6/24/25 Yes Elías Hutchison MD   ticagrelor (Brilinta) 60 mg tablet Take 1 tablet (60 mg) by mouth 2 times a day. 6/24/24 6/24/25 Yes Elías Hutchison MD   semaglutide (OZEMPIC) 1 mg/dose (4 mg/3 mL) pen injector Inject 1 mg under the skin 1 (one) time per week.  Patient not taking: Reported on 10/8/2024 9/15/24   Sigifredo Webster MD   tirzepatide (Mounjaro) 12.5 mg/0.5 mL pen injector Inject 12.5 mg under the skin 1 (one) time per week.  Patient not taking: Reported on 10/8/2024 7/14/24   Sigifredo Webster MD   HYDROcodone-acetaminophen (Norco) 5-325 mg tablet Take 1 tablet by mouth every 6 hours if needed for moderate pain (4 - 6) for up to 3 days. 9/30/24 10/3/24  DO LINA Ibrahim ROS:   Constitutional:   neg    Neuro/Psych:    Peripheral neuropathy  Eyes:    Yellow bruising still noted above right eye  Ears:   neg    Nose:   neg    Mouth:   neg    Throat:   neg    Neck:   neg    Cardio:    peripheral edema (BLE with R>L)  Respiratory:   neg    Endocrine:   neg    GI:   neg    :   neg    Musculoskeletal:    decreased ROM (right arm in splint from fx of radius)  Hematologic:    Holding Brilanta   bruises/bleeds easily  Skin:   Yellow bruising noted on Left knee      Physical Exam  Vitals and nursing note reviewed.   Constitutional:       General: She is awake.      Appearance: Normal appearance. She is normal weight.   HENT:      Head: Normocephalic and atraumatic.      Jaw: There is normal jaw occlusion.      Comments: Brusing around right outer orbital from fall  Eyes:      General: Lids are normal.      Conjunctiva/sclera: Conjunctivae normal.   Neck:      Trachea: Trachea normal.   Cardiovascular:      Rate and Rhythm:  Normal rate and regular rhythm.      Pulses: Normal pulses.      Heart sounds: Normal heart sounds.      Comments: Chronic BLE edema from CHF.  Recent negative scan of Right leg for DVT  Pulmonary:      Effort: Pulmonary effort is normal.      Breath sounds: Normal breath sounds and air entry.      Comments: Hx of left lung wedge resection  Abdominal:      General: Abdomen is protuberant.      Palpations: Abdomen is soft.   Musculoskeletal:      Cervical back: Full passive range of motion without pain and normal range of motion.      Right lower le+ Edema present.      Left lower le+ Edema present.      Comments: Right arm in splint cast.  Bruising on left knee from fall - pt reports still sore   Feet:      Comments: 2 broken toes on left foot from the fall - was in orthopedic shoe, now back to normal shoes  Skin:     General: Skin is warm and dry.      Capillary Refill: Capillary refill takes less than 2 seconds.   Neurological:      General: No focal deficit present.      Mental Status: She is alert and oriented to person, place, and time.      GCS: GCS eye subscore is 4. GCS verbal subscore is 5. GCS motor subscore is 6.      Cranial Nerves: Cranial nerves 2-12 are intact.      Sensory: Sensory deficit (peripheral neuropathy from diabetes) present.      Motor: Motor function is intact.      Coordination: Coordination is intact.      Gait: Gait is intact.   Psychiatric:         Attention and Perception: Attention and perception normal.         Mood and Affect: Mood and affect normal.         Speech: Speech normal.         Behavior: Behavior normal. Behavior is cooperative.         Thought Content: Thought content normal.         Cognition and Memory: Cognition and memory normal.         Judgment: Judgment normal.          PAT AIRWAY:   Airway:     Mallampati::  II    TM distance::  >3 FB    Neck ROM::  Full  normal        Visit Vitals  /67   Pulse 73   Temp 36.1 °C (96.9 °F)   Sat 97%    DASI Risk  Score      Flowsheet Row Pre-Admission Testing from 10/8/2024 in [facility]   Can you take care of yourself (eat, dress, bathe, or use toilet)?  2.75 filed at 10/08/2024 0750   Can you walk indoors, such as around your house? 1.75 filed at 10/08/2024 0750   Can you walk a block or two on level ground?  2.75 filed at 10/08/2024 0750   Can you climb a flight of stairs or walk up a hill? 5.5 filed at 10/08/2024 0750   Can you run a short distance? 8 filed at 10/08/2024 0750   Can you do light work around the house like dusting or washing dishes? 2.7 filed at 10/08/2024 0750   Can you do moderate work around the house like vacuuming, sweeping floors or carrying groceries? 3.5 filed at 10/08/2024 0750   Can you do heavy work around the house like scrubbing floors or lifting and moving heavy furniture?  0 filed at 10/08/2024 0750   Can you do yard work like raking leaves, weeding or pushing a mower? 4.5 filed at 10/08/2024 0750   Can you have sexual relations? 5.25 filed at 10/08/2024 0750   Can you participate in moderate recreational activities like golf, bowling, dancing, doubles tennis or throwing a baseball or football? 6 filed at 10/08/2024 0750   Can you participate in strenous sports like swimming, singles tennis, football, basketball, or skiing? 7.5 filed at 10/08/2024 0750   DASI SCORE 50.2 filed at 10/08/2024 0750   METS Score (Will be calculated only when all the questions are answered) 8.9 filed at 10/08/2024 0750          Caprini DVT Assessment      Flowsheet Row Pre-Admission Testing from 10/8/2024 in [facility] ED to Hosp-Admission (Discharged) from 1/18/2024 in [facility] Bldg A 1 with Eden De Los Santos MD and Harpreet M Marie, DO   DVT Score 12 filed at 10/08/2024 0835 6 filed at 01/18/2024 2136   Medical Factors Swollen legs filed at 10/08/2024 0835 --   Surgical Factors New trauma admission, Minor surgery planned filed at 10/08/2024 0835 --   BMI 31-40  (Obesity) filed at 10/08/2024 0835 31-40 (Obesity) filed at 01/18/2024 2136   RETIRED: Current Status -- Swollen legs filed at 01/18/2024 2136   RETIRED: Age -- 60-75 years filed at 01/18/2024 2136          Modified Frailty Index      Flowsheet Row Pre-Admission Testing from 10/8/2024 in [facility]   Non-independent functional status (problems with dressing, bathing, personal grooming, or cooking) 0 filed at 10/08/2024 0839   History of diabetes mellitus  0.0909 filed at 10/08/2024 0839   History of COPD 0 filed at 10/08/2024 0839   History of CHF 0.0909 filed at 10/08/2024 0839   History of MI 0.0909 filed at 10/08/2024 0839   History of Percutaneous Coronary Intervention, Cardiac Surgery, or Angina 0.0909 filed at 10/08/2024 0839   Hypertension requiring the use of medication  0.0909 filed at 10/08/2024 0839   Peripheral vascular disease 0 filed at 10/08/2024 0839   Impaired sensorium (cognitive impairement or loss, clouding, or delirium) 0 filed at 10/08/2024 0839   TIA or CVA withouy residual deficit 0 filed at 10/08/2024 0839   Cerebrovascular accident with deficit 0 filed at 10/08/2024 0839   Modified Frailty Index Calculator .4545 filed at 10/08/2024 0839          CHADS2 Stroke Risk  Current as of 18 minutes ago        N/A 3 to 100%: High Risk   2 to < 3%: Medium Risk   0 to < 2%: Low Risk     Last Change: N/A          This score determines the patient's risk of having a stroke if the patient has atrial fibrillation.        This score is not applicable to this patient. Components are not calculated.          Revised Cardiac Risk Index      Flowsheet Row Pre-Admission Testing from 10/8/2024 in [facility]   High-Risk Surgery (Intraperitoneal, Intrathoracic,Suprainguinal vascular) 0 filed at 10/08/2024 0838   History of ischemic heart disease (History of MI, History of positive exercuse test, Current chest paint considered due to myocardial ischemia, Use of nitrate therapy, ECG  with pathological Q Waves) 1 filed at 10/08/2024 0838   History of congestive heart failure (pulmonary edemia, bilateral rales or S3 gallop, Paroxysmal nocturnal dyspnea, CXR showing pulmonary vascular redistribution) 1 filed at 10/08/2024 0838   History of cerebrovascular disease (Prior TIA or stroke) 0 filed at 10/08/2024 0838   Pre-operative insulin treatment 1 filed at 10/08/2024 0838   Pre-operative creatinine>2 mg/dl 0 filed at 10/08/2024 0838   Revised Cardiac Risk Calculator 3 filed at 10/08/2024 0838          Apfel Simplified Score      Flowsheet Row Pre-Admission Testing from 10/8/2024 in [facility]   Smoking status 1 filed at 10/08/2024 0839   History of motion sickness or PONV  0 filed at 10/08/2024 0839   Use of postoperative opioids 1 filed at 10/08/2024 0839   Gender - Female 1=Yes filed at 10/08/2024 0839   Apfel Simplified Score Calculator 3 filed at 10/08/2024 0839          Risk Analysis Index Results This Encounter         10/8/2024  0839             Do you live in a place other than your own home?: 0    Any kidney failure, kidney not working well, or seeing a kidney doctor (nephrologist)? If yes, was this for kidney stones or another problem?: 0 No    Any history of chronic (long-term) congestive heart failure (CHF)?: 5 Yes    Any shortness of breath when resting?: 0 No    In the past five years, have you been diagnosed with or treated for cancer?: No    During the last 3 months has it become difficult for you to remember things or organize your thoughts?: 0 No    Have you lost weight of 10 pounds or more in the past 3 months without trying?: 0 No    Do you have any loss of appetitie?: 0 No    Getting Around (Mobility): 0 Can get around without help    Eatin Can plan and prepare own meals    Toiletin Can use toilet without any help    Personal Hygiene (Bathing, Hand Washing, Changing Clothes): 0 Can shower or bathe without any help    SANCHEZ Cancer History: Patient does not  indicate history of cancer    Total Risk Analysis Index Score Without Cancer: 23    Total Risk Analysis Index Score: 23          Stop Bang Score      Flowsheet Row Pre-Admission Testing from 10/8/2024 in [facility]   Do you snore loudly? 0 filed at 10/08/2024 0750   Do you often feel tired or fatigued after your sleep? 0 filed at 10/08/2024 0750   Has anyone ever observed you stop breathing in your sleep? 0 filed at 10/08/2024 0750   Do you have or are you being treated for high blood pressure? 1 filed at 10/08/2024 0750   Recent BMI (Calculated) 39.7 filed at 10/08/2024 0750   Is BMI greater than 35 kg/m2? 1=Yes filed at 10/08/2024 0750   Age older than 50 years old? 1=Yes filed at 10/08/2024 0750   Is your neck circumference greater than 17 inches (Male) or 16 inches (Female)? 1 filed at 10/08/2024 0750   Gender - Male 0=No filed at 10/08/2024 0750   STOP-BANG Total Score 4 filed at 10/08/2024 0750            No results found for this or any previous visit (from the past 24 hour(s)).     Assessment and Plan:    63 y.o.  female  scheduled for Right ORIF of radius on 10/11/24 with Dr. Perez for R radial fracture from fall in the driveway.  PMHX includes HTN, HLD, CHF (EF 35%), pacer/ICD (s/p arrest during cardiac cath), CAD (1 stent LAD), DM (off Monjauro for a month, plan is to change over to Ozempic after surgery), GERD (well controlled), hysterectomy, and Left lung wedge resection (due to infection) .  Presents to CPM today for perioperative risk stratification and optimization    Neuro:  No neurologic diagnosis, however, the patient is at increased risk for perioperative delirium secondary to  age, sensory impairment, Patient instructed on and provided cognitive exercises  Patient is at increased risk for perioperative CVA secondary to  cardiac disease, perioperative interruption of antithrombotic, HTN, DM, operative time > 2.5 hours  Reports some syncopal episodes back in January, possibly  related to increases in Monjauro dosing.  ICD interrogated and followed up with cardiology at the time.    HEENT:  No HEENT diagnosis or significant findings on chart review or clinical presentation and evaluation. No further preoperative testing/intervention indicated at this time.    Cardiovascular:  No CV diagnosis or significant findings on chart review or clinical presentation and evaluation. No further preoperative testing or intervention is indicated at this time.  METS: 8.9  RCRI: 3 points, 15% risk for postoperative MACE   MEME: 0.6% risk for 30 day postoperative MACE  EKG - Normal sinus rhythm  Right bundle branch block  Possible Inferior infarct , age undetermined  Anteroseptal infarct (cited on or before 18-JUL-2019)  Abnormal ECG  Confirmed by Elías Hutchison ([id number]) on 7/16/2024 9:43:47 PM    Pt with multiple remote ICD reports - will need ICD interrogation in preop and may need ICD turned off and back on for surgery  Device check May 2024: 0 VF detections or 0 AT/AF detections    Duplex scan 5/30/24:  CONCLUSIONS:  Right Lower Venous: No evidence of acute deep vein thrombus visualized in the right lower extremity.  Left Lower Venous: The left common femoral vein demonstrates normal spontaneous and respirophasic flow.    LEA: 3/18/24  CONCLUSIONS:  Right Lower PVR: No evidence of arterial occlusive disease in the right lower extremity at rest. Normal digital perfusion noted. Triphasic flow is noted in the right common femoral artery, right posterior tibial artery and right dorsalis pedis artery.  Left Lower PVR: No evidence of arterial occlusive disease in the left lower extremity at rest. Normal digital perfusion noted. Triphasic flow is noted in the left common femoral artery, left posterior tibial artery and left dorsalis pedis artery.     Comparison:  Compared with study from 12/30/2021, no significant change.    TTE: 6/13/23  CONCLUSIONS:   1. Left ventricular systolic function is moderately  decreased with a 35% estimated ejection fraction.   2. Eagle, mid and apical anterior wall, mid and apical anterior septum, and apical lateral segment are abnormal.   3. Spectral Doppler shows an impaired relaxation pattern of left ventricular diastolic filling.    Cardiac Cath: 7/19/19  Coronary Lesion Summary:  Vessel   Stenosis   Vessel Segment  LAD    30% stenosis      mid  RCA    40% stenosis    proximal  CONCLUSIONS:   1. Nonobstructive CAD in a right dominant system.   2. Patent stent in proximal LAD.   3. Elevated left and right heart filling pressures.   4. Mild pulmonary hypertension.   5. Preserved cardiac index.   6. No evidence of intracardiac shunt.   7. No aortic stenosis.     October 10, 2016  Coronary Lesion Summary:  Vessel       Stenosis    Vessel Segment  LAD        100% stenosis    proximal  Circumflex 30% stenosis      distal  CONCLUSIONS:   1. Culprit vessel(s): left anterior descending.   2. Successful OCT guided PCI of the proximal LAD with a 3.5 x 28mm Xience Alpine JAQUI postdilated to 4.0mm.   3. Severe single vessel CAD in a right dominant system.   4. Elevated LVEDP.   5. No aortic stenosis.     Cardiac Imaging:  CT of the chest without contrast 2/28/2023  IMPRESSION:  1. Stable postsurgical changes of left upper lobe and left lower lobe  wedge resection with unchanged platelike and nodular thickening along  the left lower lobe surgical bed. This likely represents postsurgical  scarring.  2. Unchanged innumerable bilateral solid noncalcified pulmonary  nodules measuring up to 5 mm. Continued attention on follow-up  imaging is recommended.  3. Moderate coronary artery calcification with suggestion of coronary  artery stent. Suggestion of sequela of prior infarction in the LAD  territory     PVR December 30, 2021  CONCLUSIONS:     Right Lower PVR: No evidence of arterial occlusive disease in the right lower extremity at rest. Normal digital perfusion noted. Triphasic flow is noted in the  right common femoral artery, right posterior tibial artery and right dorsalis pedis artery.     Left Lower PVR: No evidence of arterial occlusive disease in the left lower extremity at rest. Normal digital perfusion noted. Triphasic flow is noted in the left common femoral artery, left posterior tibial artery and left dorsalis pedis artery.      Pulmonary:  No pulmonary diagnosis, however patient is at increased risk of perioperative complications secondary to  age > 60, heart failure, duration of surgery > 2 hours, types of anesthetic.  Pt with Left wedge resection due to chronic infection. BBS clear today on exam.  Stop Bang score is 4 placing patient at intermediate risk for JAKOB  ARISCAT: <26 points, 1.6% risk of in-hospital postoperative pulmonary complication  PRODIGY: High risk for opioid induced respiratory depression  Pumonary toilet education discussed, patient also provided deep breathing exercises and incentive spirometry educational handout    Renal:   No renal diagnosis, however patient is at increase risk for perioperative renal complications secondary to  Age equal to or greater than 56, BMI equal to or greater than 30, EF less than 40% , HTN, diabetes, use of an ace, arb, or NSAID, ascites  Pt at Moderate risk for perioperative MIKEY based on Dynamic Predictive Scoring Tool for Perioperative MIKEY     Endocrine:  Pt with DMII.  Has been off Monjauro for over a month.  Is waiting to start Ozempic after surgery.  New HbA1C drawn today.  Last was 7.3 in May of this year.  Pt also on insulin and oral hyperglycemics.    Hematologic:  No hematologic diagnosis, however patient is at an increased risk for DVT.  Last dose of Brilanta was 10/5/24  Caprini Score 12, patient at High risk for perioperative DVT.  Patient provided with VTE education/handout.  Type and screen collected today.  Pt consents to blood and blood products if needed.    Gastrointestinal:   No GI diagnosis or significant findings on chart  review or clinical presentation and evaluation.   Eat-10 score 0  Apfel 3    Infectious disease:   No infectious diagnosis or significant findings on chart review or clinical presentation and evaluation.     Musculoskeletal:   Broken Right arm, in splint.  Yellow bruising present on left knee area and right orbital from the initial fall.    Anesthesia/Airway:  No anesthesia complications      Medication instructions and NPO guidelines reviewed with the patient.  All questions or concerns discussed and addressed.          10/10/24: Emailed Dr. Perez and also called and left a message with the patient about her HbA1C value from this week of 8.6, was previously down to 7.3 in May 2024.  Most likely related to transition and holding of GLP-1 medications after her fall.  Pt knows to be checking blood sugars and will half her dose of lantus tonight.  Will recheck and treat if needed for the OR.

## 2024-10-08 NOTE — PREPROCEDURE INSTRUCTIONS
Thank you for visiting [facility] ([facility] today for your pre-procedure evaluation.    This summary includes instructions and information to aid you during your perioperative period.  Please read carefully. If you have any questions about your visit today, please call the number listed above.  If you become ill or have any changes to your health before your surgery, please contact your primary care provider and alert your surgeon.    Preparing for your Surgery       Exercises  Preoperative Deep Breathing Exercises  Why it is important to do deep breathing exercises before my surgery?  Deep breathing exercises strengthen your breathing muscles.  This helps you to recover after your surgery and decreases the chance of breathing complications.  How are the deep breathing exercises done?  Sit straight with your back supported.  Breathe in deeply and slowly through your nose. Your lower rib cage should expand and your abdomen may move forward.  Hold that breath for 3 to 5 seconds.  Breathe out through pursed lips, slowly and completely.  Rest and repeat 10 times every hour while awake.  Rest longer if you become dizzy or lightheaded.       Incentive Spirometer   You were provided with an incentive spirometer in [facility], please follow the below instructions.   You were not provided an incentive spirometer in [facility], please disregard the incentive spirometer instructions  What is an incentive spirometer?  An incentive spirometer is a device used before and after surgery to “exercise” your lungs.  It helps you to take deeper breaths to expand your lungs.  Below is an example of a basic incentive spirometer.  The device you receive may differ slightly but they all function the same.    Why do I need to use an incentive spirometer?  Using your incentive spirometer prepares your lungs for surgery and helps prevent lung problems after surgery.  How do I use my incentive spirometer?  When you're using  your incentive spirometer, make sure to breathe through your mouth. If you breathe through your nose, the incentive spirometer won't work properly. You can hold your nose if you have trouble.  If you feel dizzy at any time, stop and rest. Try again at a later time.  Follow the steps below:  Set up your incentive spirometer, expand the flexible tubing and connect to the outlet.  Sit upright in a chair or bed. Hold the incentive spirometer at eye level.   Put the mouthpiece in your mouth and close your lips tightly around it. Slowly breathe out (exhale) completely.  Breathe in (inhale) slowly through your mouth as deeply as you can. As you take a breath, you will see the piston rise inside the large column. While the piston rises, the indicator should move upwards. It should stay in between the 2 arrows (see Figure).  Try to get the piston as high as you can, while keeping the indicator between the arrows.   If the indicator doesn't stay between the arrows, you're breathing either too fast or too slow.  When you get it as high as you can, hold your breath for 10 seconds, or as long as possible. While you're holding your breath, the piston will slowly fall to the base of the spirometer.  Once the piston reaches the bottom of the spirometer, breathe out slowly through your mouth. Rest for a few seconds.  Repeat 10 times. Try to get the piston to the same level with each breath.  Repeat every hour while awake  You can carefully clean the outside of the mouthpiece with an alcohol wipe or soap and water.      Preoperative Brain Exercises    What are brain exercises?  A brain exercise is any activity that engages your thinking (cognitive) skills.    What types of activities are considered brain exercises?  Jigsaw puzzles, crossword puzzles, word jumble, memory games, word search, and many more.  Many can be found free online or on your phone via a mobile neymar.    Why should I do brain exercises before my surgery?  More  recent research has shown brain exercise before surgery can lower the risk of postoperative delirium (confusion) which can be especially important for older adults.  Patients who did brain exercises for 5 to 10 hours the days before surgery, cut their risk of postoperative delirium in half up to 1 week after surgery.    Sit-to-Stand Exercise    What is the sit-to-stand exercise?  The sit-to-stand exercise strengthens the muscles of your lower body and muscles in the center of your body (core muscles for stability) helping to maintain and improve your strength and mobility.  How do I do the sit-to-stand exercise?  The goal is to do this exercise without using your arms or hands.  If this is too difficult, use your arms and hands or a chair with armrests to help slowly push yourself to the standing position and lower yourself back to the sitting position. As the movement becomes easier use your arms and hands less.    Steps to the sit-to-stand exercise  Sit up tall in a sturdy chair, knees bent, feet flat on the floor shoulder-width apart.  Shift your hips/pelvis forward in the chair to correctly position yourself for the next movement.  Lean forward at your hips.  Stand up straight putting equal weight on both feet.  Check to be sure you are properly aligned with the chair, in a slow controlled movement sit back down.  Repeat this exercise 10-15 times.  If needed you can do it fewer times until your strength improves.  Rest for 1 minute.  Do another 10-15 sit-to-stand exercises.  Try to do this in the morning and evening.        Instructions    Preoperative Fasting Guidelines    Why must I stop eating and drinking near surgery time?  With sedation, food or liquid in your stomach can enter your lungs causing serious complications  Food can increase nausea and vomiting  When do I need to stop eating and drinking before my surgery?      Do not eat any food after midnight the night before your surgery/procedure. You may  have up to 13.5 ounces of clear liquid until TWO hours before your instructed arrival time to the hospital.  This includes water, black tea/coffee, (no milk or cream) apple juice, and electrolyte drinks (Gatorade). You may chew gum until TWO hours before your surgery/procedure , Do not eat any food or drink any liquids after midnight the night before your surgery/procedure.  You may have sips of water to take medications.            Simple things you can do to help prevent blood clots     Blood clots are blockages that can form in the body's veins. When a blood clot forms in your deep veins, it may be called a deep vein thrombosis, or DVT for short. Blood clots can happen in any part of the body where blood flows, but they are most common in the arms and legs. If a piece of a blood clot breaks free and travels to the lungs, it is called a pulmonary embolus (PE). A PE can be a very serious problem.         Being in the hospital or having surgery can raise your chances of getting a blood clot because you may not be well enough to move around as much as you normally do.         Ways you can help prevent blood clots in the hospital       Wearing SCDs  SCDs stands for Sequential Compression Devices.   SCDs are special sleeves that wrap around your legs. They attach to a pump that fills them with air to gently squeeze your legs every few minutes.  This helps return the blood in your legs to your heart.   SCDs should only be taken off when walking or bathing. SCDs may not be comfortable, but they can help save your life.              Pump SCD leg sleeves  Wearing compression stockings - if your doctor orders them. These special snug-fitting stockings gently squeeze your legs to help blood flow.       Walking. Walking helps move the blood in your legs.   If your doctor says it is ok, try walking the halls at least   5 times a day. Ask us to help you get up, so you don't fall.      Taking any blood-thinning medicines your  doctor orders.              Ways you can help prevent blood clots at home         Wearing compression stockings - if your doctor orders them.   Walking - to help move the blood in your legs.    Taking any blood-thinning medicines your doctor orders.      Signs of a blood clot or PE    Tell your doctor or nurse right away if you have any of the problems listed below.         If you are at home, seek medical care right away. Call 911 for chest pain or problems breathing.            Signs of a blood clot (DVT) - such as pain, swelling, redness, or warmth in your arm or legs.  Signs of a pulmonary embolism (PE) - such as chest pain or feeling short of breath      Tobacco and Alcohol;  Do not drink alcohol or smoke within 24 hours of surgery.  It is best to quit smoking for as long as possible before any surgery or procedure.        The Week before Surgery        Seven days before Surgery  Check your [facility] medication instructions  Do the exercises provided to you by [facility]   Arrange for a responsible, adult licensed  to take you home after surgery and stay with you for 24 hours.  You will not be permitted to drive yourself home if you have received any anesthetic/sedation  Six days before surgery  Check your [facility] medication instructions  Do the exercises provided to you by [facility]   Start using Chlorhexidene (CHG) body wash if prescribed  Five days before surgery  Check your [facility] medication instructions  Do the exercises provided to you by [facility]   Continue to use CHG body wash if prescribed  Three days before surgery  Check your [facility] medication instructions  Do the exercises provided to you by [facility]   Continue to use CHG body wash if prescribed  Two days before surgery  Check your [facility] medication instructions  Do the exercises provided to you by [facility]   Continue to use CHG body wash if prescribed    The Day before Surgery       Check your [facility] medication and all other [facility] instructions including when to stop eating and drinking  You  will be called with your arrival time for surgery in the late afternoon.  If you do not receive a call please reach out to your surgeon's office.  Do not smoke or drink 24 hours before surgery  Prepare items to bring with you to the hospital  Shower with your chlorhexidine wash if prescribed  Brush your teeth and use your chlorhexidine dental rinse if prescribed    The Day of Surgery       Check your [facility] medication instructions  Ensure you follow the instructions for when to stop eating and drinking  Shower, if prescribed use CHG.  Do not apply any lotions, creams, moisturizers, perfume or deodorant  Brush your teeth and use your CHG dental rinse if prescribed  Wear loose comfortable clothing  Avoid make-up  Remove  jewelry and piercings, consider professional piercing removal with a plastic spacer if needed  Bring photo ID and Insurance card  Bring an accurate medication list that includes medication dose, frequency and allergies  Bring a copy of your advanced directives (will, health care power of )  Bring any devices and controllers as well as medical devices you have been provided with for surgery (CPAP, slings, braces, etc.)  Dentures, eyeglasses, and contacts will be removed before surgery, please bring cases for contacts or glasses

## 2024-10-10 ENCOUNTER — ANESTHESIA EVENT (OUTPATIENT)
Dept: OPERATING ROOM | Facility: HOSPITAL | Age: 64
End: 2024-10-10
Payer: MEDICARE

## 2024-10-11 ENCOUNTER — ANESTHESIA (OUTPATIENT)
Dept: OPERATING ROOM | Facility: HOSPITAL | Age: 64
End: 2024-10-11
Payer: MEDICARE

## 2024-10-11 ENCOUNTER — HOSPITAL ENCOUNTER (OUTPATIENT)
Facility: HOSPITAL | Age: 64
Setting detail: OUTPATIENT SURGERY
Discharge: HOME | End: 2024-10-11
Attending: ORTHOPAEDIC SURGERY | Admitting: ORTHOPAEDIC SURGERY
Payer: MEDICARE

## 2024-10-11 ENCOUNTER — APPOINTMENT (OUTPATIENT)
Dept: RADIOLOGY | Facility: HOSPITAL | Age: 64
End: 2024-10-11
Payer: MEDICARE

## 2024-10-11 VITALS
WEIGHT: 220.46 LBS | BODY MASS INDEX: 31.56 KG/M2 | DIASTOLIC BLOOD PRESSURE: 53 MMHG | HEART RATE: 61 BPM | HEIGHT: 70 IN | TEMPERATURE: 97.3 F | OXYGEN SATURATION: 100 % | RESPIRATION RATE: 16 BRPM | SYSTOLIC BLOOD PRESSURE: 102 MMHG

## 2024-10-11 DIAGNOSIS — S69.91XA WRIST INJURY, RIGHT, INITIAL ENCOUNTER: ICD-10-CM

## 2024-10-11 DIAGNOSIS — G89.18 POST-OP PAIN: Primary | ICD-10-CM

## 2024-10-11 DIAGNOSIS — S52.501A CLOSED FRACTURE OF DISTAL END OF RIGHT RADIUS, UNSPECIFIED FRACTURE MORPHOLOGY, INITIAL ENCOUNTER: ICD-10-CM

## 2024-10-11 LAB
GLUCOSE BLD MANUAL STRIP-MCNC: 230 MG/DL (ref 74–99)
GLUCOSE BLD MANUAL STRIP-MCNC: 242 MG/DL (ref 74–99)

## 2024-10-11 PROCEDURE — 82947 ASSAY GLUCOSE BLOOD QUANT: CPT

## 2024-10-11 PROCEDURE — 36415 COLL VENOUS BLD VENIPUNCTURE: CPT | Performed by: ANESTHESIOLOGY

## 2024-10-11 PROCEDURE — 25609 OPTX DST RD XART FX/EP SEP3+: CPT | Performed by: ORTHOPAEDIC SURGERY

## 2024-10-11 PROCEDURE — 2500000005 HC RX 250 GENERAL PHARMACY W/O HCPCS: Performed by: ORTHOPAEDIC SURGERY

## 2024-10-11 PROCEDURE — 3700000001 HC GENERAL ANESTHESIA TIME - INITIAL BASE CHARGE: Performed by: ORTHOPAEDIC SURGERY

## 2024-10-11 PROCEDURE — 2780000003 HC OR 278 NO HCPCS: Performed by: ORTHOPAEDIC SURGERY

## 2024-10-11 PROCEDURE — 2500000005 HC RX 250 GENERAL PHARMACY W/O HCPCS: Performed by: ANESTHESIOLOGY

## 2024-10-11 PROCEDURE — C1713 ANCHOR/SCREW BN/BN,TIS/BN: HCPCS | Performed by: ORTHOPAEDIC SURGERY

## 2024-10-11 PROCEDURE — 3700000002 HC GENERAL ANESTHESIA TIME - EACH INCREMENTAL 1 MINUTE: Performed by: ORTHOPAEDIC SURGERY

## 2024-10-11 PROCEDURE — 7100000009 HC PHASE TWO TIME - INITIAL BASE CHARGE: Performed by: ORTHOPAEDIC SURGERY

## 2024-10-11 PROCEDURE — 2720000007 HC OR 272 NO HCPCS: Performed by: ORTHOPAEDIC SURGERY

## 2024-10-11 PROCEDURE — 7100000001 HC RECOVERY ROOM TIME - INITIAL BASE CHARGE: Performed by: ORTHOPAEDIC SURGERY

## 2024-10-11 PROCEDURE — 2500000004 HC RX 250 GENERAL PHARMACY W/ HCPCS (ALT 636 FOR OP/ED)

## 2024-10-11 PROCEDURE — 7100000010 HC PHASE TWO TIME - EACH INCREMENTAL 1 MINUTE: Performed by: ORTHOPAEDIC SURGERY

## 2024-10-11 PROCEDURE — 99222 1ST HOSP IP/OBS MODERATE 55: CPT

## 2024-10-11 PROCEDURE — 3600000004 HC OR TIME - INITIAL BASE CHARGE - PROCEDURE LEVEL FOUR: Performed by: ORTHOPAEDIC SURGERY

## 2024-10-11 PROCEDURE — 7100000002 HC RECOVERY ROOM TIME - EACH INCREMENTAL 1 MINUTE: Performed by: ORTHOPAEDIC SURGERY

## 2024-10-11 PROCEDURE — 3600000009 HC OR TIME - EACH INCREMENTAL 1 MINUTE - PROCEDURE LEVEL FOUR: Performed by: ORTHOPAEDIC SURGERY

## 2024-10-11 DEVICE — IMPLANTABLE DEVICE: Type: IMPLANTABLE DEVICE | Site: WRIST | Status: FUNCTIONAL

## 2024-10-11 DEVICE — SCREW, LOCKING, T8 FULL THREAD, 2.4 X 20 MM: Type: IMPLANTABLE DEVICE | Site: WRIST | Status: FUNCTIONAL

## 2024-10-11 DEVICE — SCREW, BONE, T8 FULL THREAD, 2.7 X 16MM: Type: IMPLANTABLE DEVICE | Site: WRIST | Status: FUNCTIONAL

## 2024-10-11 DEVICE — SCREW, LOCKING, T8 FULL THREAD, 2.7 X 14MM: Type: IMPLANTABLE DEVICE | Site: WRIST | Status: FUNCTIONAL

## 2024-10-11 DEVICE — SCREW, LOCKING, T8 FULL THREAD, 2.4 X 18 MM: Type: IMPLANTABLE DEVICE | Site: WRIST | Status: FUNCTIONAL

## 2024-10-11 DEVICE — SCREW, LOCKING, T8 FULL THREAD, 2.7 X 16MM: Type: IMPLANTABLE DEVICE | Site: WRIST | Status: FUNCTIONAL

## 2024-10-11 DEVICE — SCREW, BONE, T8 FULL THREAD, 2.7 X 14 MM: Type: IMPLANTABLE DEVICE | Site: WRIST | Status: FUNCTIONAL

## 2024-10-11 RX ORDER — OXYCODONE HYDROCHLORIDE 5 MG/1
5 TABLET ORAL EVERY 6 HOURS PRN
Qty: 28 TABLET | Refills: 0 | Status: SHIPPED | OUTPATIENT
Start: 2024-10-11 | End: 2024-10-18

## 2024-10-11 RX ORDER — PHENYLEPHRINE HCL IN 0.9% NACL 0.4MG/10ML
SYRINGE (ML) INTRAVENOUS AS NEEDED
Status: DISCONTINUED | OUTPATIENT
Start: 2024-10-11 | End: 2024-10-11

## 2024-10-11 RX ORDER — LIDOCAINE HCL/PF 100 MG/5ML
SYRINGE (ML) INTRAVENOUS AS NEEDED
Status: DISCONTINUED | OUTPATIENT
Start: 2024-10-11 | End: 2024-10-11

## 2024-10-11 RX ORDER — ASPIRIN 81 MG
100 TABLET, DELAYED RELEASE (ENTERIC COATED) ORAL 2 TIMES DAILY
Qty: 10 TABLET | Refills: 0 | Status: SHIPPED | OUTPATIENT
Start: 2024-10-11 | End: 2024-10-26

## 2024-10-11 RX ORDER — FENTANYL CITRATE 50 UG/ML
INJECTION, SOLUTION INTRAMUSCULAR; INTRAVENOUS AS NEEDED
Status: DISCONTINUED | OUTPATIENT
Start: 2024-10-11 | End: 2024-10-11

## 2024-10-11 RX ORDER — ROCURONIUM BROMIDE 10 MG/ML
INJECTION, SOLUTION INTRAVENOUS AS NEEDED
Status: DISCONTINUED | OUTPATIENT
Start: 2024-10-11 | End: 2024-10-11

## 2024-10-11 RX ORDER — PHENYLEPHRINE 10 MG/250 ML(40 MCG/ML)IN 0.9 % SOD.CHLORIDE INTRAVENOUS
CONTINUOUS PRN
Status: DISCONTINUED | OUTPATIENT
Start: 2024-10-11 | End: 2024-10-11

## 2024-10-11 RX ORDER — PHENYLEPHRINE HYDROCHLORIDE 10 MG/ML
INJECTION INTRAVENOUS AS NEEDED
Status: DISCONTINUED | OUTPATIENT
Start: 2024-10-11 | End: 2024-10-11

## 2024-10-11 RX ORDER — SODIUM CHLORIDE 0.9 G/100ML
IRRIGANT IRRIGATION AS NEEDED
Status: DISCONTINUED | OUTPATIENT
Start: 2024-10-11 | End: 2024-10-11 | Stop reason: HOSPADM

## 2024-10-11 RX ORDER — ONDANSETRON HYDROCHLORIDE 2 MG/ML
INJECTION, SOLUTION INTRAVENOUS AS NEEDED
Status: DISCONTINUED | OUTPATIENT
Start: 2024-10-11 | End: 2024-10-11

## 2024-10-11 RX ORDER — LIDOCAINE HYDROCHLORIDE 10 MG/ML
0.1 INJECTION, SOLUTION INFILTRATION; PERINEURAL ONCE
Status: DISCONTINUED | OUTPATIENT
Start: 2024-10-11 | End: 2024-10-11 | Stop reason: HOSPADM

## 2024-10-11 RX ORDER — OXYCODONE HYDROCHLORIDE 5 MG/1
5 TABLET ORAL EVERY 4 HOURS PRN
Status: DISCONTINUED | OUTPATIENT
Start: 2024-10-11 | End: 2024-10-11 | Stop reason: HOSPADM

## 2024-10-11 RX ORDER — HYDROMORPHONE HYDROCHLORIDE 1 MG/ML
0.2 INJECTION, SOLUTION INTRAMUSCULAR; INTRAVENOUS; SUBCUTANEOUS EVERY 5 MIN PRN
Status: DISCONTINUED | OUTPATIENT
Start: 2024-10-11 | End: 2024-10-11 | Stop reason: HOSPADM

## 2024-10-11 RX ORDER — ONDANSETRON HYDROCHLORIDE 2 MG/ML
4 INJECTION, SOLUTION INTRAVENOUS ONCE AS NEEDED
Status: DISCONTINUED | OUTPATIENT
Start: 2024-10-11 | End: 2024-10-11 | Stop reason: HOSPADM

## 2024-10-11 RX ORDER — PROPOFOL 10 MG/ML
INJECTION, EMULSION INTRAVENOUS AS NEEDED
Status: DISCONTINUED | OUTPATIENT
Start: 2024-10-11 | End: 2024-10-11

## 2024-10-11 RX ORDER — CEFAZOLIN 1 G/1
INJECTION, POWDER, FOR SOLUTION INTRAVENOUS AS NEEDED
Status: DISCONTINUED | OUTPATIENT
Start: 2024-10-11 | End: 2024-10-11

## 2024-10-11 RX ORDER — HYDROMORPHONE HYDROCHLORIDE 1 MG/ML
0.5 INJECTION, SOLUTION INTRAMUSCULAR; INTRAVENOUS; SUBCUTANEOUS EVERY 5 MIN PRN
Status: DISCONTINUED | OUTPATIENT
Start: 2024-10-11 | End: 2024-10-11 | Stop reason: HOSPADM

## 2024-10-11 RX ORDER — SODIUM CHLORIDE, SODIUM LACTATE, POTASSIUM CHLORIDE, CALCIUM CHLORIDE 600; 310; 30; 20 MG/100ML; MG/100ML; MG/100ML; MG/100ML
50 INJECTION, SOLUTION INTRAVENOUS CONTINUOUS
Status: DISCONTINUED | OUTPATIENT
Start: 2024-10-11 | End: 2024-10-11 | Stop reason: HOSPADM

## 2024-10-11 SDOH — HEALTH STABILITY: MENTAL HEALTH: CURRENT SMOKER: 0

## 2024-10-11 ASSESSMENT — COLUMBIA-SUICIDE SEVERITY RATING SCALE - C-SSRS
6. HAVE YOU EVER DONE ANYTHING, STARTED TO DO ANYTHING, OR PREPARED TO DO ANYTHING TO END YOUR LIFE?: NO
2. HAVE YOU ACTUALLY HAD ANY THOUGHTS OF KILLING YOURSELF?: NO
1. IN THE PAST MONTH, HAVE YOU WISHED YOU WERE DEAD OR WISHED YOU COULD GO TO SLEEP AND NOT WAKE UP?: NO

## 2024-10-11 ASSESSMENT — PAIN SCALES - GENERAL
PAINLEVEL_OUTOF10: 0 - NO PAIN
PAIN_LEVEL: 0

## 2024-10-11 ASSESSMENT — PAIN - FUNCTIONAL ASSESSMENT
PAIN_FUNCTIONAL_ASSESSMENT: 0-10

## 2024-10-11 NOTE — ANESTHESIA PREPROCEDURE EVALUATION
Patient: Pj Clarke    Procedure Information       Anesthesia Start Date/Time: 10/11/24 0718    Procedure: ORIF right distal radius fracture / 60 Minutes (Right: Wrist)    Location: [facility]  / [facility]    Surgeons: Donis Perez MD            Relevant Problems   Cardiac   (+) Benign essential hypertension   (+) Breast pain   (+) CAD in native artery   (+) Chronic systolic congestive heart failure   (+) Essential hypertriglyceridemia   (+) Hyperlipidemia   (+) Hypertension   (+) Presence of stent in LAD coronary artery   (+) STEMI (ST elevation myocardial infarction) (Multi)      Neuro   (+) Peripheral neuropathy      GI   (+) Rectal bleeding      /Renal   (+) Acute cystitis with hematuria      Endocrine   (+) Diabetic neuropathy (Multi)   (+) Type 2 diabetes mellitus with circulatory disorder   (+) Type 2 diabetes mellitus with diabetic neuropathy (Multi)      Musculoskeletal   (+) Osteoarthritis of ankle and foot      ID   (+) Candidal skin infection   (+) Fungal skin infection   (+) Onychomycosis       Clinical information reviewed:   Tobacco  Allergies  Meds   Med Hx  Surg Hx   Fam Hx  Soc Hx        NPO Detail:  NPO/Void Status  Carbohydrate Drink Given Prior to Surgery? : N  Date of Last Liquid: 10/11/24  Time of Last Liquid: 0430  Date of Last Solid: 10/10/24         Physical Exam    Airway  Mallampati: II  Neck ROM: full     Cardiovascular   Rhythm: regular  Rate: normal     Dental    Pulmonary    Abdominal          Anesthesia Plan    History of general anesthesia?: yes  History of complications of general anesthesia?: no    ASA 3     general     The patient is not a current smoker.    intravenous induction   Anesthetic plan and risks discussed with patient.  Use of blood products discussed with patient who.    Plan discussed with CRNA.

## 2024-10-11 NOTE — DISCHARGE INSTRUCTIONS
Post-Operative Instructions  Dr. Donis Perez [phone]    Dressing:  You have a bandage or splint covering your operative site.    Do not remove the dressing until your next scheduled appointment with your surgeon or therapist. Keep your dressing clean and dry. The dressing will be removed at that appointment.     Post Anesthesia Instructions:  If you received general anesthesia or IV sedation for your procedure for the next 24 hours: No driving, no drinking alcohol, no sleeping aids, no important decision making, and have an adult with you for 24 hours.    Showering:  You may shower following surgery but please adhere to above instructions regarding the dressing. If showering with bandage/splint in place please ensure that it is kept dry and covered while bathing. There are commercially available cast bags that can be used to protect your dressing while showering. If using garbage bags please make sure that there are no holes in the bag and that the bag has been sealed above the dressing. If the bandage gets wet you must contact your surgeon's office to make arrangements to be seen to have the bandage changed.     Ice/Elevation:  The application of ice to your surgical site after surgery will help with pain control and swelling. This can be very effective for 48-72 hours after surgery. Please be careful to avoid getting bandage wet from a leaky ice bag. Please be advised that the ice may need to be applied for longer periods of time for the cooling effect to penetrate the post-operative dressing.     Elevation of the operative site above the level of the heart as much as possible for the first 48-72 hours after surgery. Use your sling if you have been provided one while standing or walking. If your fingers are not included in the dressing you are encouraged to attempt finger range of motion as this will help with your hand swelling and ultimate recovery.    Pain Medication:  If you received a regional  anesthetic on the day of your surgery your arm and hand may be numb for up to 24 hours after surgery. It is important to wear your sling while the block is still effective in order to protect your arm. It is advisable to take pain medications prior to going to sleep in case the regional anesthesia medication wears off while you are sleeping.    Take your pain medications as directed. Narcotic pain medications can cause lethargy, nausea and constipation. Please contact your surgeon's office and discontinue the medication if these symptoms become problematic. Eating a regular diet, drinking fluids and walking can help with constipation from these medications. Avoid alcohol consumption and driving while taking narcotic pain medications.     Additional pain control options:     You are encouraged to take over the counter medications like Advil / Motrin / Ibuprofen / Aleve in addition to your prescribed pain medications after surgery.    Smoking/Tobacco:  Tobacco use is known to interfere with wound and fracture healing and increase post-operative pain. It can also increase your risk of poor outcomes following surgery. Please do not use tobacco or nicotine products following your surgery. This includes smokeless tobacco, or nicotine replacement products (patches, gum, etc.).    Driving:  It is not advisable to operate a vehicle while using narcotic pain medications. Additionally, please be advised that you are likely to have challenges operating a car or motorcycle while you are still in your postoperative dressing and this could increase your risk of being involved in an accident and being cited for driving while physically impaired.     Warning Signs:  Observe your arm/hand and incision site (if visible) for increased redness, inflammation, drainage, odor or pain that is unrelieved by rest, elevation or medication. Please contact your surgeon's office immediately if you develop any of these issues or if you develop a  fever greater than 101°.    Follow Up Appointments:  Your post-operative appointment has been scheduled for 10/24/2024 at 9:45 am    [facility], [address], [place], [place], [address]

## 2024-10-11 NOTE — CONSULTS
Pj Clarke is a 64 y.o. year old female patient who presents for Procedure(s):  ORIF right distal radius fracture / 60 Minutes with Donis Perez MD on 10/11/2024.  Acute Pain consulted for assistance with pain control.     Anticipated Postop Pain Issues -   Palliative: typically relieved with IV analgesics and regional local anesthetics  Provocative: typically with movement  Quality: typically burning and aching  Radiation: typically none  Severity: typically severe 8-10/10  Timing: typically constant    Past Medical History:   Diagnosis Date    Acute ischemic heart disease, unspecified     Acute coronary syndrome    Breast cyst had 1 as a teenager.    CHF (congestive heart failure)     Chronic pain disorder     Coronary artery disease     1 stent LAD    Dermatochalasis of unspecified eye, unspecified eyelid 11/13/2018    Dermatochalasis    Diabetes mellitus (Multi)     Essential (primary) hypertension 11/16/2022    Benign essential hypertension    Fractures     GERD (gastroesophageal reflux disease)     Peripheral neuropathy     Personal history of other mental and behavioral disorders     History of depression    Pure hyperglyceridemia 11/28/2016    Essential hypertriglyceridemia    Type 2 diabetes mellitus         Past Surgical History:   Procedure Laterality Date    BREAST CYST ASPIRATION  when i was a young teenager.    CHOLECYSTECTOMY  06/19/2013    Cholecystectomy    GALLBLADDER SURGERY  05/11/2016    Gallbladder Surgery    HYSTERECTOMY  02/11/2015    Hysterectomy    OTHER SURGICAL HISTORY Left 06/29/2021    Lung wedge resection    OTHER SURGICAL HISTORY  01/03/2017    Implantable Cardioverter-Defibrillator    TONSILLECTOMY  05/11/2016    Tonsillectomy        Family History   Problem Relation Name Age of Onset    Other (Malignant neoplasm) Mother      Other (Malignant neoplasm) Father      Colon cancer Mother's Sister      Other (Family history of diabetes mellitus) Other          Social History      Socioeconomic History    Marital status:      Spouse name: Not on file    Number of children: Not on file    Years of education: Not on file    Highest education level: Not on file   Occupational History    Not on file   Tobacco Use    Smoking status: Never     Passive exposure: Never    Smokeless tobacco: Never   Vaping Use    Vaping status: Never Used   Substance and Sexual Activity    Alcohol use: Yes     Comment: rarely    Drug use: Not Currently    Sexual activity: Not on file   Other Topics Concern    Not on file   Social History Narrative    Not on file     Social Determinants of Health     Financial Resource Strain: Low Risk  (1/19/2024)    Overall Financial Resource Strain (CARDIA)     Difficulty of Paying Living Expenses: Not hard at all   Food Insecurity: Food Insecurity Present (9/19/2024)    Received from [facility]    Hunger Vital Sign     Worried About Running Out of Food in the Last Year: Sometimes true     Ran Out of Food in the Last Year: Sometimes true   Transportation Needs: No Transportation Needs (1/19/2024)    PRAPARE - Transportation     Lack of Transportation (Medical): No     Lack of Transportation (Non-Medical): No   Physical Activity: Not on file   Stress: Not on file   Social Connections: Not on file   Intimate Partner Violence: Not on file   Housing Stability: Low Risk  (1/19/2024)    Housing Stability Vital Sign     Unable to Pay for Housing in the Last Year: No     Number of Places Lived in the Last Year: 1     Unstable Housing in the Last Year: No        Allergies   Allergen Reactions    Penicillins Hives and Shortness of breath    Atorvastatin Other     severe leg cramps    Metformin Hcl Diarrhea     Only with HCL brand.   Good with ER         Review of Systems  Gen: No fatigue, anorexia, insomnia, fever.   Eyes: No vision loss, double vision, drainage, eye pain.   ENT: No pharyngitis, dry mouth, no hearing changes or ear discharge  Cardiac: No chest pain,  palpitations, syncope, near syncope.   Pulmonary: No shortness of breath, cough, hemoptysis.   Heme/lymph: No swollen glands, fever, bleeding.   GI: No abdominal pain, change in bowel habits, melena, hematemesis, hematochezia, nausea, vomiting, diarrhea.   : No discharge, dysuria, frequency, urgency, hematuria.  Endo: No polyuria or weight loss.   Musculoskeletal: Negative for any pain or loss of ROM/weakness  Skin: No rashes or lesions  Neuro: Normal speech, no numbness or weakness. No gait difficulties  Review of systems is otherwise negative unless stated above or in history of present illness.    Physical Exam:  Constitutional:  no distress, alert and cooperative  Eyes: clear sclera  Head/Neck: No apparent injury, trachea midline  Respiratory/Thorax: Patent airways, thorax symmetric, breathing comfortably  Cardiovascular: no pitting edema  Gastrointestinal: Nondistended  Musculoskeletal: ROM intact  Extremities: no clubbing  Neurological: alert, painter x4  Psychological: Appropriate affect    Results for orders placed or performed during the hospital encounter of 10/11/24 (from the past 24 hour(s))   POCT GLUCOSE   Result Value Ref Range    POCT Glucose 242 (H) 74 - 99 mg/dL        Pj Clarke is a 64 y.o. year old female patient who presents for Procedure(s):  ORIF right distal radius fracture / 60 Minutes with Donis Perez MD on 10/11/2024. Acute Pain consulted for assistance with pain control.     Plan:    - Right single shot axillary blocks performed pre-operatively 10/11/2024  - Pain medications per primary team  - Will see on POD1 if inpatient    Acute Pain Resident  pg [phone] ph [phone]

## 2024-10-11 NOTE — H&P
[facility] Department of Orthopaedic Surgery   Surgical History & Physical >30 Days    Reason for Surgery: right distal radius fracture  Planned Procedure: ORIF R distal radius    History & Physical Reviewed:  Pj Clarke is a 64 y.o. female presenting with the above symptoms. This patient was evaluated as an outpatient, and a plan was made for operative management. Risks and benefits were discussed, and the patient and/or caregivers elected to proceed. The patient presents for the above listed procedure today.     Past Medical History:   Diagnosis Date    Acute ischemic heart disease, unspecified     Acute coronary syndrome    Breast cyst had 1 as a teenager.    CHF (congestive heart failure)     Chronic pain disorder     Coronary artery disease     1 stent LAD    Dermatochalasis of unspecified eye, unspecified eyelid 11/13/2018    Dermatochalasis    Diabetes mellitus (Multi)     Essential (primary) hypertension 11/16/2022    Benign essential hypertension    Fractures     GERD (gastroesophageal reflux disease)     Peripheral neuropathy     Personal history of other mental and behavioral disorders     History of depression    Pure hyperglyceridemia 11/28/2016    Essential hypertriglyceridemia    Type 2 diabetes mellitus      Past Surgical History:   Procedure Laterality Date    BREAST CYST ASPIRATION  when i was a young teenager.    CHOLECYSTECTOMY  06/19/2013    Cholecystectomy    GALLBLADDER SURGERY  05/11/2016    Gallbladder Surgery    HYSTERECTOMY  02/11/2015    Hysterectomy    OTHER SURGICAL HISTORY Left 06/29/2021    Lung wedge resection    OTHER SURGICAL HISTORY  01/03/2017    Implantable Cardioverter-Defibrillator    TONSILLECTOMY  05/11/2016    Tonsillectomy     Social History     Tobacco Use    Smoking status: Never     Passive exposure: Never    Smokeless tobacco: Never   Substance Use Topics    Alcohol use: Yes     Comment: rarely     Prior to Admission medications    Medication Sig  "Start Date End Date Taking? Authorizing Provider   aspirin 81 mg EC tablet Take 1 tablet (81 mg) by mouth once daily.    Historical Provider, MD   Crestor 40 mg tablet Take 1 tablet (40 mg) by mouth once daily. 9/11/24 9/11/25  Elías Hutchison MD   dapagliflozin propanediol (Farxiga) 10 mg Take 1 tablet (10 mg) by mouth once daily. 9/11/24   Sigifredo Webster MD   ergocalciferol (Vitamin D-2) 1.25 MG (73353 UT) capsule Take 1 capsule (50,000 Units) by mouth every 14 (fourteen) days. 9/11/24   Sigifredo Webster MD   ezetimibe (Zetia) 10 mg tablet Take 1 tablet (10 mg) by mouth once daily. 6/24/24 6/24/25  Elías Hutchison MD   furosemide (Lasix) 20 mg tablet Take 1 tablet (20 mg) by mouth once daily. 6/24/24 6/24/25  Elías Hutchison MD   gabapentin (Neurontin) 300 mg capsule Take 1 capsule (300 mg) by mouth 2 times a day. 9/11/24   Sigifredo Webster MD   glimepiride (Amaryl) 4 mg tablet Take 1 tablet (4 mg) by mouth 2 times a day. 9/11/24   Sigifredo Webster MD   HYDROcodone-acetaminophen (Norco) 5-325 mg tablet Take 1 tablet by mouth every 6 hours if needed for moderate pain (4 - 6) for up to 7 days. 10/3/24 10/10/24  Donis Perez MD   insulin glargine (Lantus) 100 unit/mL (3 mL) pen Inject 25 Units under the skin once daily at bedtime. Take as directed per insulin instructions. 9/11/24 2/26/25  Sigifredo Webster MD   metFORMIN  mg 24 hr tablet Take 2 tablets (1,000 mg) by mouth 2 times daily (morning and late afternoon). 9/11/24   Sigifredo Webster MD   metoprolol succinate XL (Toprol-XL) 100 mg 24 hr tablet Take 1 tablet (100 mg) by mouth twice a day. 6/24/24 6/24/25  Elías Hutchison MD   omeprazole (PriLOSEC) 40 mg DR capsule Take 1 capsule (40 mg) by mouth once daily. Do not crush or chew 9/11/24   Sigifredo Webster MD   pen needle, diabetic (BD Ultra-Fine Short Pen Needle) 31 gauge x 5/16\" needle Inject 1 each into the skin 2 times a day. 9/11/24   Sigifredo Webster MD "   potassium chloride CR 20 mEq ER tablet Take 1 tablet (20 mEq) by mouth once daily. 9/11/24   Sigifredo Webster MD   sacubitriL-valsartan (Entresto) 49-51 mg tablet Take 1 tablet by mouth 2 times a day. 6/24/24 6/24/25  Elías Hutchison MD   semaglutide (OZEMPIC) 1 mg/dose (4 mg/3 mL) pen injector Inject 1 mg under the skin 1 (one) time per week.  Patient not taking: Reported on 10/8/2024 9/15/24   Sigifredo Webster MD   spironolactone (Aldactone) 25 mg tablet Take 0.5 tablets (12.5 mg) by mouth once daily. 6/24/24 6/24/25  Elías Hutchison MD   ticagrelor (Brilinta) 60 mg tablet Take 1 tablet (60 mg) by mouth 2 times a day. 6/24/24 6/24/25  Elías Hutchison MD   tirzepatide (Mounjaro) 12.5 mg/0.5 mL pen injector Inject 12.5 mg under the skin 1 (one) time per week.  Patient not taking: Reported on 10/8/2024 7/14/24   Sigifredo Webster MD     Allergies   Allergen Reactions    Penicillins Hives and Shortness of breath    Atorvastatin Other     severe leg cramps    Metformin Hcl Diarrhea     Only with HCL brand.   Good with ER       Review of Systems:   Gen: Denies recent weight loss  Neuro: Denies recent confusion  Ophtho: Denies changes in vision  ENT: Denies changes in hearing  Endo: Denies weight loss/weight gain  CV: Denies chest pain  Resp: Denies shortness of breath  GI: Denies melena/hematochezia  : Denies painful urination  MSK: Per above HPI  Heme: No abnormal bleeding  Psych: Denies hallucinations    Physical Exam:  - Constitutional: No acute distress, cooperative  - Eyes: EOM grossly intact  - Head/Neck: Trachea midline  - Respiratory/Thorax: Normal work of breathing  - Cardiovascular: RRR on peripheral palpation  - Gastrointestinal: Nondistended  - Psychological: Appropriate mood/behavior  - Skin: Warm and dry. Additional findings in musculoskeletal evaluation  - Musculoskeletal: Moves all extremities    ERAS patient?: No    COVID-19 Risk Consent:   Surgeon has reviewed the key risks related to  uzair COVID-19 and subsequent sequelae.       Luisito Adams MD   Orthopaedic Surgery PGY-2

## 2024-10-11 NOTE — ANESTHESIA POSTPROCEDURE EVALUATION
36Patient: Pj Clarke    Procedure Summary       Date: 10/11/24 Room / Location: [facility] OR 08 / [facility]    Anesthesia Start: 0718 Anesthesia Stop: 0902    Procedure: ORIF right distal radius fracture / 60 Minutes (Right: Wrist) Diagnosis:       Closed fracture of distal end of right radius, unspecified fracture morphology, initial encounter      (Closed fracture of distal end of right radius, unspecified fracture morphology, initial encounter [S52.501A])    Surgeons: Donis Perez MD Responsible Provider: Syl Munson MD    Anesthesia Type: general ASA Status: 3            Anesthesia Type: general    Vitals Value Taken Time   /62 10/11/24 0857   Temp 36 10/11/24 0903   Pulse 60 10/11/24 0900   Resp 11 10/11/24 0900   SpO2 100 % 10/11/24 0900   Vitals shown include unfiled device data.    Anesthesia Post Evaluation    Patient location during evaluation: PACU  Patient participation: complete - patient participated  Level of consciousness: awake  Pain score: 0  Pain management: adequate  Airway patency: patent  Cardiovascular status: acceptable  Respiratory status: acceptable  Hydration status: balanced  Postoperative Nausea and Vomiting: none        There were no known notable events for this encounter.

## 2024-10-11 NOTE — OP NOTE
ORIF right distal radius fracture / 60 Minutes (R) Operative Note     Date: 10/11/2024  OR Location: [facility] OR    Name: Pj Clarke, : 1960, Age: 64 y.o., MRN: [id number], Sex: female    Diagnosis  Pre-op Diagnosis      * Closed fracture of distal end of right radius, unspecified fracture morphology, initial encounter [S52.501A] Post-op Diagnosis     * Closed fracture of distal end of right radius, unspecified fracture morphology, initial encounter [S52.501A]     Procedures  ORIF right distal radius fracture / 60 Minutes  68254 - OR OPTX DSTL RADL I-ARTIC FX/EPIPHYSL SEP 3 FRAG      Surgeons      * Donis Perez - Primary    Resident/Fellow/Other Assistant:  Surgeons and Role:     * Luisito Adams MD - Resident - Assisting    Procedure Summary  Anesthesia: General  ASA: III  Anesthesia Staff: Anesthesiologist: Syl Munson MD  CRNA: GARIMA Matos-CRNA  SRNA: Mey Rosen RN  Estimated Blood Loss: 2 mL  Intra-op Medications: Administrations occurring from 0650 to 0835 on 10/11/24:  * No intraprocedure medications in log *           Anesthesia Record               Intraprocedure I/O Totals          Intake    Phenylephrine Drip 0.00 mL    The total shown is the total volume documented since Anesthesia Start was filed.    Total Intake 0 mL          Specimen: No specimens collected     Staff:   Circulator: Shikha Balesub Person: Faina         Drains and/or Catheters: * None in log *    Tourniquet Times:   * Missing tourniquet times found for documented tourniquets in lo *     Implants:  Implants       Type Name Action Serial No.      Screw SCREW, BONE, T8 FULL THREAD, 2.7 X 14 MM - [id number] Implanted      Screw SCREW, BONE, T8 FULL THREAD, 2.7 X 16MM - [id number] Implanted      Screw SCREW, LOCKING, T8 FULL THREAD, 2.7 X 14MM - [id number] Implanted      Screw SCREW, LOCKING, T8 FULL THREAD, 2.7 X 16MM - [id number] Implanted       RIGHT STANDARD VOLAR  PLATE Implanted       Screw SCREW, LOCKING, T8 FULL THREAD, 2.4 X 18 MM - [id number] Implanted      Screw SCREW, LOCKING, T8 FULL THREAD, 2.4 X 20 MM - [id number] Implanted               Findings: Comminuted volarly unstable intra-articular fracture right distal radius    Indications: Pj Clarke is an 64 y.o. female who is having surgery for Closed fracture of distal end of right radius, unspecified fracture morphology, initial encounter [S52.501A].      The patient was seen in the preoperative area. The risks, benefits, complications, treatment options, non-operative alternatives, expected recovery and outcomes were discussed with the patient. The possibilities of reaction to medication, pulmonary aspiration, injury to surrounding structures, bleeding, recurrent infection, the need for additional procedures, failure to diagnose a condition, and creating a complication requiring transfusion or operation were discussed with the patient. The patient concurred with the proposed plan, giving informed consent.  The site of surgery was properly noted/marked if necessary per policy. The patient has been actively warmed in preoperative area. Preoperative antibiotics have been ordered and given within 1 hours of incision. Venous thrombosis prophylaxis have been ordered including bilateral sequential compression devices    Procedure Details:   Patient is a 64-year-old female with multiple medical comorbidities who fell over a week ago and sustained a comminuted intra-articular distal radius fracture of the right upper extremity with a volar instability.  Fracture pattern indicates operative intervention.  She presents today for this procedure after obtaining preoperative clearance through the center of perioperative medicine.  Preoperatively the right arm was identified and marked for surgery.  Informed consent process was completed.    Patient was brought to the operating and placed supine on the operating table.  A timeout procedure was  performed to verify the correct patient, procedure and operative site.  Intravenous antibiotics were administered.  General anesthetic initiated by the anesthesia service.  Right upper extremity was prepped and draped in usual sterile fashion.  Limb was exsanguinated and the tourniquet was inflated to 250 mmHg.    We had a longitudinal incision just radial to the FCR tendon sheath out to the wrist flexion crease.  We dissected through the subcutaneous tissue down to the antebrachial fascia.  The radial artery was identified and mobilized in a radial direction.  Antebrachial fascia was incised radial to the FCR tendon.  Parona space was entered.  Flexor tendons were retracted ulnarly.  Brachial radialis tendon insertion was exposed and then sharply released from the radial styloid fragment.  Pronator quadratus was elevated as an ulnarly based muscle pedicle.  This exposed our fracture.  The volar articular segment was displaced volarly and proximally.  We carefully debrided callus and clot from the fracture site and then carefully mobilized the articular segment into a more anatomic position.  Once we determined that enough soft tissue release had been done to aid our reduction we selected an appropriately sized plate from the Rizwan Variax distal radius set.  The plate was secured to the radial shaft with a 2.7 mm cortical screw through the oblong hole.  As the screw was tightened the plate helped reduce the articular segment into an anatomic position.  Fluoroscopic images confirmed appropriate implant position and fracture alignment.    We then stabilized the fracture with four 2.4 mm locking screws in the distal segment ensuring that we did not not have any dorsal cortical penetration or any intra-articular penetration into the radiocarpal joint or radial ulnar joint.  We then placed 2 additional 2.7 mm locking screws the proximal aspect of the whole.  Locking screws were chosen because of her overall poor bone  quality.  Final fluoroscopic images were obtained and saved to the patient's electronic file which demonstrated anatomic fracture alignment with appropriate implant position.  Distal radial ulnar joint motion was full smooth and stable.  Radiocarpal motion was smooth and stable as well.    The wound was copiously irrigated.  The tourniquet was deflated.  Hemostasis was obtained.  The wound was then closed in interrupted fashion.  A sterile bandage was applied.  The patient was placed into a well-padded sugar-tong splint holding the forearm into slight supination.  She was awoken from her anesthetic and transferred to recovery in stable condition.    Postoperatively she will be discharged home once comfortable.  She will remain in her splint until she returns to clinic in roughly 2 weeks at that time if comfortable we will transition her into a removable splint that she can take off for hygiene purposes.        Complications:  None; patient tolerated the procedure well.    Disposition: PACU - hemodynamically stable.  Condition: stable         Additional Details:      Attending Attestation: I was present and scrubbed for the entire procedure.    Donis Perez  Phone Number: [phone]

## 2024-10-11 NOTE — ANESTHESIA PROCEDURE NOTES
Airway  Date/Time: 10/11/2024 7:28 AM  Urgency: elective    Airway not difficult    Staffing  Performed: SRNA   Authorized by: Syl Munson MD    Performed by: Mey Rosen RN  Patient location during procedure: OR    Indications and Patient Condition  Indications for airway management: anesthesia  Spontaneous Ventilation: absent  Sedation level: deep  Preoxygenated: yes  Patient position: sniffing  MILS maintained throughout  Mask difficulty assessment: 1 - vent by mask  Planned trial extubation    Final Airway Details  Final airway type: endotracheal airway      Successful airway: ETT  Cuffed: yes   Successful intubation technique: direct laryngoscopy  Facilitating devices/methods: anterior pressure/BURP  Blade size: #4  ETT size (mm): 7.0  Cormack-Lehane Classification: grade I - full view of glottis  Placement verified by: chest auscultation   Cuff volume (mL): 10  Measured from: lips  ETT to lips (cm): 22  Number of attempts at approach: 1

## 2024-10-11 NOTE — ANESTHESIA PROCEDURE NOTES
Peripheral Block    Patient location during procedure: pre-op  Start time: 10/11/2024 6:51 AM  End time: 10/11/2024 7:01 AM  Reason for block: at surgeon's request and post-op pain management  Staffing  Performed: resident   Authorized by: GARIMA Matos-CRNA    Performed by: Shorty Saucedo MD  Preanesthetic Checklist  Completed: patient identified, IV checked, site marked, risks and benefits discussed, surgical consent, monitors and equipment checked, pre-op evaluation and timeout performed   Timeout performed at: 10/11/2024 6:51 AM  Peripheral Block  Patient position: laying flat  Prep: ChloraPrep  Patient monitoring: heart rate and continuous pulse ox  Block type: axillary  Laterality: left  Injection technique: single-shot  Guidance: ultrasound guided  Local infiltration: ropivacaine  Needle  Needle type: Tuohy   Needle gauge: 22 G  Needle length: 5 cm  Needle localization: ultrasound guidance     image stored in chart  Assessment  Injection assessment: negative aspiration for heme, no paresthesia on injection, incremental injection and local visualized surrounding nerve on ultrasound  Additional Notes  Axillary block: Informed consent obtained.  Risks, benefits, and alternatives discussed.  ASA monitors placed, timeout performed.  Patient positioned, prepped with chlorhexidine, and draped with sterile towels.      Ultrasound guidance used to visualize the brachial plexus and surrounding structures with visualization of the needle throughout duration of the procedure.  Aspiration was negative.  A total of Type of Local: 25 cc of 0.5% ropivacaine, dexamethasone 4mg, and 1:200,000 epinephrine, was divided and injected unilaterally. Patient tolerated procedure well.    Timeout by Yue RODRIGUEZ

## 2024-10-17 ENCOUNTER — APPOINTMENT (OUTPATIENT)
Dept: ORTHOPEDIC SURGERY | Facility: CLINIC | Age: 64
End: 2024-10-17
Payer: COMMERCIAL

## 2024-10-23 NOTE — PROGRESS NOTES
[facility]  Hand and Upper Extremity Service  Post Operative visit         Date of surgery: 10/11/24    Surgery(s) performed: ORIF right distal radius fracture        Subjective report: First postoperative visit. Overall doing well with current pain management.        Exam findings: Right hand reveals well healed surgical incision. Mild swelling wrist, forearm, and fingers. Stiffness with attempted finger flexion. Mild tenderness along distal ulna. Subtle clicking with forearm rotation efforts. Sensation intact to light touch.        Radiograph findings: No new images obtained       Impression: Right distal radius fracture       Plan: She was placed into a removable splint and given instructions for progressive range and motion for fingers, forearm, and wrist. She's to avoid gripping, twisting, pulling, and weight bearing. She'll return in 4 weeks for repeat clinical examination and x-rays of right wrist.        Follow Up: 4 weeks     Patient was prescribed a cock up her splint for distal radius fracture. The patient has weakness, instability and/or deformity of their right wrist which requires stabilization from this orthosis to improve their function.      Verbal and written instructions for the use, wear schedule, cleaning and application of this item were given.  Patient was instructed that should the brace result in increased pain, decreased sensation, increased swelling, or an overall worsening of their medical condition, to please contact our office immediately.     Orthotic management and training was provided for skin care, modifications due to healing tissues, edema changes, interruption in skin integrity, and safety precautions with the orthosis.           Donis Perez MD    [facility] [employer]  Department of Orthopaedic Surgery  Chief of Hand and Upper Extremity Surgery  [facility]  [facility]    Scribe Attestation  By signing my name below, I, Seb Caba   attest that this documentation has been prepared under the direction and in the presence of Dr. Donis Perez.      Dictation performed with the use of voice recognition software. Syntax and grammatical errors may exist.

## 2024-10-24 ENCOUNTER — OFFICE VISIT (OUTPATIENT)
Dept: ORTHOPEDIC SURGERY | Facility: CLINIC | Age: 64
End: 2024-10-24
Payer: MEDICARE

## 2024-10-24 VITALS — HEIGHT: 70 IN | BODY MASS INDEX: 31.5 KG/M2 | WEIGHT: 220 LBS

## 2024-10-24 DIAGNOSIS — S52.501A CLOSED FRACTURE OF DISTAL END OF RIGHT RADIUS, UNSPECIFIED FRACTURE MORPHOLOGY, INITIAL ENCOUNTER: Primary | ICD-10-CM

## 2024-10-24 PROCEDURE — 99211 OFF/OP EST MAY X REQ PHY/QHP: CPT | Performed by: ORTHOPAEDIC SURGERY

## 2024-10-24 PROCEDURE — 1159F MED LIST DOCD IN RCRD: CPT | Performed by: ORTHOPAEDIC SURGERY

## 2024-10-24 PROCEDURE — L3908 WHO COCK-UP NONMOLDE PRE OTS: HCPCS | Performed by: ORTHOPAEDIC SURGERY

## 2024-10-24 PROCEDURE — 3008F BODY MASS INDEX DOCD: CPT | Performed by: ORTHOPAEDIC SURGERY

## 2024-10-24 PROCEDURE — 1036F TOBACCO NON-USER: CPT | Performed by: ORTHOPAEDIC SURGERY

## 2024-10-24 PROCEDURE — 3052F HG A1C>EQUAL 8.0%<EQUAL 9.0%: CPT | Performed by: ORTHOPAEDIC SURGERY

## 2024-10-24 PROCEDURE — 1157F ADVNC CARE PLAN IN RCRD: CPT | Performed by: ORTHOPAEDIC SURGERY

## 2024-10-24 PROCEDURE — 3048F LDL-C <100 MG/DL: CPT | Performed by: ORTHOPAEDIC SURGERY

## 2024-10-24 PROCEDURE — 1123F ACP DISCUSS/DSCN MKR DOCD: CPT | Performed by: ORTHOPAEDIC SURGERY

## 2024-10-24 ASSESSMENT — PAIN - FUNCTIONAL ASSESSMENT: PAIN_FUNCTIONAL_ASSESSMENT: 0-10

## 2024-10-24 ASSESSMENT — PAIN DESCRIPTION - DESCRIPTORS: DESCRIPTORS: ACHING;SORE

## 2024-10-24 ASSESSMENT — PAIN SCALES - GENERAL: PAINLEVEL_OUTOF10: 5 - MODERATE PAIN

## 2024-11-15 NOTE — PROGRESS NOTES
[facility]  Hand and Upper Extremity Service  Post Operative visit         Date of surgery: 10/11/24    Surgery(s) performed: ORIF right distal radius fracture        Subjective report: Second postoperative visit.        Exam findings: Right wrist reveals well healed surgical incision.        Radiograph findings:       Impression: Sequela of right distal radius fracture       Plan:         Follow Up:             Donis Perez MD    [facility] [employer]  Department of Orthopaedic Surgery  Chief of Hand and Upper Extremity Surgery  [facility]    Scribe Attestation  By signing my name below, IJeramie Scribe   attest that this documentation has been prepared under the direction and in the presence of Dr. Donis Perez.      Dictation performed with the use of voice recognition software. Syntax and grammatical errors may exist.

## 2024-11-21 ENCOUNTER — APPOINTMENT (OUTPATIENT)
Dept: RADIOLOGY | Facility: CLINIC | Age: 64
End: 2024-11-21
Payer: MEDICARE

## 2024-11-21 ENCOUNTER — APPOINTMENT (OUTPATIENT)
Dept: ORTHOPEDIC SURGERY | Facility: CLINIC | Age: 64
End: 2024-11-21
Payer: MEDICARE

## 2024-11-21 DIAGNOSIS — S52.501A CLOSED FRACTURE OF DISTAL END OF RIGHT RADIUS, UNSPECIFIED FRACTURE MORPHOLOGY, INITIAL ENCOUNTER: ICD-10-CM

## 2024-11-24 DIAGNOSIS — S52.501A CLOSED FRACTURE OF DISTAL END OF RIGHT RADIUS, UNSPECIFIED FRACTURE MORPHOLOGY, INITIAL ENCOUNTER: Primary | ICD-10-CM

## 2024-11-24 NOTE — PROGRESS NOTES
[facility]  Hand and Upper Extremity Service  Post Operative visit         Date of surgery: 10/11/24    Surgery(s) performed: ORIF right distal radius fracture        Subjective report: Second postoperative visit. She's overall doing well with pain on the right side but function has improved. She's developed symptomatic triggering of left middle finger which has interfered with her ability to remain independent given her recovery from her right wrist surgery.        Exam findings: Right wrist reveals well healed surgical incision. Mild peeling of skin breakdown dorsally over ulna from brace but keeping this covered with a band-aid. Full finger range of motion. At least 30 degrees each of wrist flexion and extension. Full pronation and supination only to about 30 degrees actively. Passively I'm able to get her to about 50 degrees of supination with mild discomfort. Forearm rotation is smooth and stable. Left hand reveals tenderness to palpation middle finger A1 pulley and triggering with terminal middle finger flexion.        Radiograph findings: X-rays of right wrist taken today demonstrate stable postoperative findings. Maintained anatomic alignment of right distal radius.        Impression: Right distal radius fracture and left middle finger trigger finger      Plan: We gave her a left middle finger trigger injection to help her with left hand symptoms. I've encouraged her to begin weaning from her splint and progress with normal daily activities. We've given her specific stretching exercises to help her improve supination. I've offered to refer her to therapy but she's declined. She'll follow up in 4 weeks for repeat clinical examination and x-rays of right wrist and if still struggling to regain supination or other movement patterns, we'll refer her to occupational therapy.       In Office Procedures Performed: Left middle finger trigger injection   Hand / UE Inj/Asp: L long A1  for trigger finger on 11/25/2024 4:46 PM  Indications: tendon swelling and pain  Details: 25 G needle, volar approach  Medications: 0.5 mL lidocaine 10 mg/mL (1 %); 20 mg triamcinolone acetonide 40 mg/mL  Outcome: tolerated well, no immediate complications  Procedure, treatment alternatives, risks and benefits explained, specific risks discussed. Consent was given by the patient. Immediately prior to procedure a time out was called to verify the correct patient, procedure, equipment, support staff and site/side marked as required. Patient was prepped and draped in the usual sterile fashion.              Follow Up: 4 weeks            Donis Perez MD    [facility] [employer]  Department of Orthopaedic Surgery  Chief of Hand and Upper Extremity Surgery  [facility]    Scribe Attestation  By signing my name below, IJeramie , Scribjaya   attest that this documentation has been prepared under the direction and in the presence of Dr. Donis Perez.      Dictation performed with the use of voice recognition software. Syntax and grammatical errors may exist.

## 2024-11-25 ENCOUNTER — HOSPITAL ENCOUNTER (OUTPATIENT)
Dept: RADIOLOGY | Facility: CLINIC | Age: 64
Discharge: HOME | End: 2024-11-25
Payer: MEDICARE

## 2024-11-25 ENCOUNTER — APPOINTMENT (OUTPATIENT)
Dept: ORTHOPEDIC SURGERY | Facility: CLINIC | Age: 64
End: 2024-11-25
Payer: MEDICARE

## 2024-11-25 VITALS — WEIGHT: 220 LBS | HEIGHT: 70 IN | BODY MASS INDEX: 31.5 KG/M2

## 2024-11-25 DIAGNOSIS — S52.501A CLOSED FRACTURE OF DISTAL END OF RIGHT RADIUS, UNSPECIFIED FRACTURE MORPHOLOGY, INITIAL ENCOUNTER: ICD-10-CM

## 2024-11-25 DIAGNOSIS — M65.332 ACQUIRED TRIGGER FINGER OF LEFT MIDDLE FINGER: ICD-10-CM

## 2024-11-25 DIAGNOSIS — S52.501A CLOSED FRACTURE OF DISTAL END OF RIGHT RADIUS, UNSPECIFIED FRACTURE MORPHOLOGY, INITIAL ENCOUNTER: Primary | ICD-10-CM

## 2024-11-25 PROCEDURE — 73110 X-RAY EXAM OF WRIST: CPT | Mod: RIGHT SIDE | Performed by: RADIOLOGY

## 2024-11-25 PROCEDURE — 1036F TOBACCO NON-USER: CPT | Performed by: ORTHOPAEDIC SURGERY

## 2024-11-25 PROCEDURE — 3008F BODY MASS INDEX DOCD: CPT | Performed by: ORTHOPAEDIC SURGERY

## 2024-11-25 PROCEDURE — 1123F ACP DISCUSS/DSCN MKR DOCD: CPT | Performed by: ORTHOPAEDIC SURGERY

## 2024-11-25 PROCEDURE — 3052F HG A1C>EQUAL 8.0%<EQUAL 9.0%: CPT | Performed by: ORTHOPAEDIC SURGERY

## 2024-11-25 PROCEDURE — 1159F MED LIST DOCD IN RCRD: CPT | Performed by: ORTHOPAEDIC SURGERY

## 2024-11-25 PROCEDURE — 99024 POSTOP FOLLOW-UP VISIT: CPT | Performed by: ORTHOPAEDIC SURGERY

## 2024-11-25 PROCEDURE — 3048F LDL-C <100 MG/DL: CPT | Performed by: ORTHOPAEDIC SURGERY

## 2024-11-25 PROCEDURE — 20550 NJX 1 TENDON SHEATH/LIGAMENT: CPT | Performed by: ORTHOPAEDIC SURGERY

## 2024-11-25 PROCEDURE — 1157F ADVNC CARE PLAN IN RCRD: CPT | Performed by: ORTHOPAEDIC SURGERY

## 2024-11-25 PROCEDURE — 73110 X-RAY EXAM OF WRIST: CPT | Mod: RT

## 2024-11-25 RX ORDER — LIDOCAINE HYDROCHLORIDE 10 MG/ML
0.5 INJECTION, SOLUTION INFILTRATION; PERINEURAL
Status: COMPLETED | OUTPATIENT
Start: 2024-11-25 | End: 2024-11-25

## 2024-11-25 RX ORDER — TRIAMCINOLONE ACETONIDE 40 MG/ML
20 INJECTION, SUSPENSION INTRA-ARTICULAR; INTRAMUSCULAR
Status: COMPLETED | OUTPATIENT
Start: 2024-11-25 | End: 2024-11-25

## 2024-11-25 ASSESSMENT — PAIN - FUNCTIONAL ASSESSMENT: PAIN_FUNCTIONAL_ASSESSMENT: 0-10

## 2024-11-25 ASSESSMENT — PAIN DESCRIPTION - DESCRIPTORS: DESCRIPTORS: ACHING;SORE

## 2024-11-25 ASSESSMENT — PAIN SCALES - GENERAL: PAINLEVEL_OUTOF10: 5 - MODERATE PAIN

## 2024-12-10 ENCOUNTER — OFFICE VISIT (OUTPATIENT)
Dept: CARDIOLOGY | Facility: HOSPITAL | Age: 64
End: 2024-12-10
Payer: MEDICARE

## 2024-12-10 VITALS
BODY MASS INDEX: 30.06 KG/M2 | DIASTOLIC BLOOD PRESSURE: 62 MMHG | OXYGEN SATURATION: 98 % | HEIGHT: 70 IN | WEIGHT: 210 LBS | HEART RATE: 79 BPM | SYSTOLIC BLOOD PRESSURE: 100 MMHG

## 2024-12-10 DIAGNOSIS — I50.22 CHRONIC SYSTOLIC CONGESTIVE HEART FAILURE: ICD-10-CM

## 2024-12-10 DIAGNOSIS — I25.5 CARDIOMYOPATHY, ISCHEMIC: ICD-10-CM

## 2024-12-10 DIAGNOSIS — I25.10 CAD IN NATIVE ARTERY: Primary | ICD-10-CM

## 2024-12-10 DIAGNOSIS — E78.00 PURE HYPERCHOLESTEROLEMIA: ICD-10-CM

## 2024-12-10 PROCEDURE — 3048F LDL-C <100 MG/DL: CPT | Performed by: NURSE PRACTITIONER

## 2024-12-10 PROCEDURE — 99214 OFFICE O/P EST MOD 30 MIN: CPT | Performed by: NURSE PRACTITIONER

## 2024-12-10 PROCEDURE — 3074F SYST BP LT 130 MM HG: CPT | Performed by: NURSE PRACTITIONER

## 2024-12-10 PROCEDURE — 1159F MED LIST DOCD IN RCRD: CPT | Performed by: NURSE PRACTITIONER

## 2024-12-10 PROCEDURE — 3052F HG A1C>EQUAL 8.0%<EQUAL 9.0%: CPT | Performed by: NURSE PRACTITIONER

## 2024-12-10 PROCEDURE — 1036F TOBACCO NON-USER: CPT | Performed by: NURSE PRACTITIONER

## 2024-12-10 PROCEDURE — 3078F DIAST BP <80 MM HG: CPT | Performed by: NURSE PRACTITIONER

## 2024-12-10 PROCEDURE — 3008F BODY MASS INDEX DOCD: CPT | Performed by: NURSE PRACTITIONER

## 2024-12-10 PROCEDURE — 1157F ADVNC CARE PLAN IN RCRD: CPT | Performed by: NURSE PRACTITIONER

## 2024-12-10 PROCEDURE — 1123F ACP DISCUSS/DSCN MKR DOCD: CPT | Performed by: NURSE PRACTITIONER

## 2024-12-10 ASSESSMENT — ENCOUNTER SYMPTOMS
OCCASIONAL FEELINGS OF UNSTEADINESS: 0
DEPRESSION: 0
LOSS OF SENSATION IN FEET: 0

## 2024-12-10 NOTE — PROGRESS NOTES
Primary Care Physician: Sigifredo Webster MD  Date of Visit: 12/10/2024  2:30 PM EST  Location of visit: [facility]     Chief Complaint:   Chief Complaint   Patient presents with    Hospital Follow-up     ED visit 09- (fall with injury)    Follow-up        HPI / Summary:   Pj Clarke is a 64 y.o. female presents for followup. Seen in collaboration with Dr. Hutchison. She has been able to daily activity without chest pain or dyspnea. She has mild dyspnea on exertion walking a prolonged distance that is unchanged and ongoing for the past two years. Her right leg edema is unchanged. She has intentionally been losing weight. The patient denies chest pain, palpitations, syncope, orthopnea, paroxysmal nocturnal dyspnea, or bleeding problems.                Past Medical History:  Past Medical History:   Diagnosis Date    Acute ischemic heart disease, unspecified     Acute coronary syndrome    Breast cyst had 1 as a teenager.    CHF (congestive heart failure)     Chronic pain disorder     Coronary artery disease     1 stent LAD    Dermatochalasis of unspecified eye, unspecified eyelid 11/13/2018    Dermatochalasis    Diabetes mellitus (Multi)     Essential (primary) hypertension 11/16/2022    Benign essential hypertension    Fractures     GERD (gastroesophageal reflux disease)     Peripheral neuropathy     Personal history of other mental and behavioral disorders     History of depression    Pure hyperglyceridemia 11/28/2016    Essential hypertriglyceridemia    Type 2 diabetes mellitus         Past Surgical History:  Past Surgical History:   Procedure Laterality Date    BREAST CYST ASPIRATION  when i was a young teenager.    CHOLECYSTECTOMY  06/19/2013    Cholecystectomy    GALLBLADDER SURGERY  05/11/2016    Gallbladder Surgery    HYSTERECTOMY  02/11/2015    Hysterectomy    OTHER SURGICAL HISTORY Left 06/29/2021    Lung wedge resection    OTHER SURGICAL HISTORY  01/03/2017    Implantable  "Cardioverter-Defibrillator    TONSILLECTOMY  05/11/2016    Tonsillectomy          Social History:   reports that she has never smoked. She has never been exposed to tobacco smoke. She has never used smokeless tobacco. She reports current alcohol use. She reports that she does not currently use drugs.     Family History:  family history includes Colon cancer in her mother's sister; Family history of diabetes mellitus in an other family member; Malignant neoplasm in her father and mother.      Allergies:  Allergies   Allergen Reactions    Penicillins Hives and Shortness of breath    Atorvastatin Other     severe leg cramps    Metformin Hcl Diarrhea     Only with HCL brand.   Good with ER       Outpatient Medications:  Current Outpatient Medications   Medication Instructions    aspirin 81 mg, Daily    Crestor 40 mg, oral, Daily    dapagliflozin propanediol (FARXIGA) 10 mg, oral, Daily    ergocalciferol (VITAMIN D-2) 50,000 Units, oral, Every 14 days    ezetimibe (ZETIA) 10 mg, oral, Daily    furosemide (LASIX) 20 mg, oral, Daily    gabapentin (NEURONTIN) 300 mg, oral, 2 times daily    glimepiride (AMARYL) 4 mg, oral, 2 times daily    insulin glargine (LANTUS) 25 Units, subcutaneous, Nightly, Take as directed per insulin instructions.    metFORMIN XR (GLUCOPHAGE-XR) 1,000 mg, oral, 2 times daily (morning and late afternoon)    metoprolol succinate XL (TOPROL-XL) 100 mg, oral, Twice a day (mid-day and evening)    Mounjaro 12.5 mg, subcutaneous, Once Weekly    omeprazole (PRILOSEC) 40 mg, oral, Daily, Do not crush or chew    pen needle, diabetic (BD Ultra-Fine Short Pen Needle) 31 gauge x 5/16\" needle 1 each, intradermal, 2 times daily    potassium chloride CR 20 mEq ER tablet 20 mEq, oral, Daily    sacubitriL-valsartan (Entresto) 49-51 mg tablet 1 tablet, oral, 2 times daily    semaglutide (OZEMPIC) 1 mg, subcutaneous, Once Weekly    spironolactone (ALDACTONE) 12.5 mg, oral, Daily    ticagrelor (BRILINTA) 60 mg, oral, 2 " "times daily       Physical Exam:  Vitals:    12/10/24 1439   BP: 100/62   BP Location: Left arm   Patient Position: Sitting   BP Cuff Size: Adult   Pulse: 79   SpO2: 98%   Weight: 95.3 kg (210 lb)   Height: 1.778 m (5' 10\")     Wt Readings from Last 5 Encounters:   12/10/24 95.3 kg (210 lb)   11/25/24 99.8 kg (220 lb)   10/24/24 99.8 kg (220 lb)   10/11/24 100 kg (220 lb 7.4 oz)   10/08/24 99 kg (218 lb 3.2 oz)     Body mass index is 30.13 kg/m².   GENERAL: alert, cooperative, pleasant, in no acute distress  SKIN: warm and dry  NECK: Normal JVD, negative HJR  CARDIAC: Regular rate and rhythm with no rubs, murmurs, or gallops  CHEST: Normal respiratory efforts, lungs clear to auscultation bilaterally.  ABDOMEN: soft, nontender, nondistended  EXTREMITIES: +1 right lower extremity edema and no left lower extremity edema +2 palpable RP bilaterally and +1 DP Pulses bilaterally     Last Labs:  Recent Labs     10/08/24  0838 08/13/24  1436 05/30/24  1359   WBC 6.8 6.5 5.5   HGB 13.1 14.6 14.6   HCT 41.4 45.0 43.6    264 257   MCV 94 92 91     Recent Labs     10/08/24  0838 05/30/24  1359 02/23/24  1338    141 140   K 4.7 4.6 4.9    109* 108*   BUN 26* 28* 21   CREATININE 0.87 1.08* 1.04     CMP -  Lab Results   Component Value Date    CALCIUM 9.8 10/08/2024    PHOS 3.8 02/23/2024    PROT 6.6 05/30/2024    ALBUMIN 4.5 05/30/2024    AST 13 05/30/2024    ALT 15 05/30/2024    ALKPHOS 68 05/30/2024    BILITOT 0.6 05/30/2024       LIPID PANEL -   Lab Results   Component Value Date    CHOL 88 05/30/2024    HDL 33.9 05/30/2024    LDLF - 09/23/2023    TRIG 223 (H) 05/30/2024   LDL 10 5/30/24    Lab Results   Component Value Date    BNP 54 07/18/2019    HGBA1C 8.6 (H) 10/08/2024    HGBA1C 10.5 (H) 04/08/2019       Last Cardiology Tests:  ECG:  Reviewed EKG from 6/13/24- normal sinus rhythm HR 86, RBBB, possible inferior infarct and anteroseptal infarct    Echo:  6/13/24  CONCLUSIONS:   1. Left ventricular systolic " function is moderately decreased with a 35% estimated ejection fraction.   2. [place], mid and apical anterior wall, mid and apical anterior septum, and apical lateral segment are abnormal.   3. Spectral Doppler shows an impaired relaxation pattern of left ventricular diastolic filling.    March 17, 2021  CONCLUSIONS:   1. The left ventricular systolic function is moderately decreased with a 35% estimated ejection fraction.   2. Mid and apical anterior septum, apex, and mid and apical anterior wall are abnormal.   3. Spectral Doppler shows an impaired relaxation pattern of left ventricular diastolic filling.           Cath:  July 19, 2019  Coronary Lesion Summary:  Vessel   Stenosis   Vessel Segment  LAD    30% stenosis      mid  RCA    40% stenosis    proximal  CONCLUSIONS:   1. Nonobstructive CAD in a right dominant system.   2. Patent stent in proximal LAD.   3. Elevated left and right heart filling pressures.   4. Mild pulmonary hypertension.   5. Preserved cardiac index.   6. No evidence of intracardiac shunt.   7. No aortic stenosis.        October 10, 2016  Coronary Lesion Summary:  Vessel       Stenosis    Vessel Segment  LAD        100% stenosis    proximal  Circumflex 30% stenosis      distal  CONCLUSIONS:   1. Culprit vessel(s): left anterior descending.   2. Successful OCT guided PCI of the proximal LAD with a 3.5 x 28mm Xience Alpine JAQUI postdilated to 4.0mm.   3. Severe single vessel CAD in a right dominant system.   4. Elevated LVEDP.   5. No aortic stenosis.        Stress Test:  Stress Results:  No results found for this or any previous visit from the past 365 days.           Cardiac Imaging:  CT of the chest without contrast 2/28/2023  IMPRESSION:  1. Stable postsurgical changes of left upper lobe and left lower lobe  wedge resection with unchanged platelike and nodular thickening along  the left lower lobe surgical bed. This likely represents postsurgical  scarring.  2. Unchanged innumerable bilateral  solid noncalcified pulmonary  nodules measuring up to 5 mm. Continued attention on follow-up  imaging is recommended.  3. Moderate coronary artery calcification with suggestion of coronary  artery stent. Suggestion of sequela of prior infarction in the LAD  territory     PVR December 30, 2021  CONCLUSIONS:     Right Lower PVR: No evidence of arterial occlusive disease in the right lower extremity at rest. Normal digital perfusion noted. Triphasic flow is noted in the right common femoral artery, right posterior tibial artery and right dorsalis pedis artery.     Left Lower PVR: No evidence of arterial occlusive disease in the left lower extremity at rest. Normal digital perfusion noted. Triphasic flow is noted in the left common femoral artery, left posterior tibial artery and left dorsalis pedis artery.    Device check May 2024: 0 VF detections or 0 AT/AF detections       Assessment/Plan   Pj was seen today for hospital follow-up and follow-up.  Diagnoses and all orders for this visit:  CAD in native artery (Primary)  Cardiomyopathy, ischemic  Pure hypercholesterolemia  Chronic systolic congestive heart failure        In summary Ms. Clarke is a pleasant 64 year-old white female past medical history significant for type II insulin-requiring diabetes, hypertension, hyperlipidemia, and coronary artery disease with non-ST elevation myocardial infarction (2/2015), anterior ST elevation MI s/p PCI with drug eluding stent to proximal LAD on 10/10/16, with residual ischemic cardiomyopathy EF 30% s/p ICD. She is overall feeling well. She has mild dyspnea on exertion walking a prolonged distance that I suspect is likely due to deconditioning. Blood pressure is acceptable. She is euvolemic by clinical exam. Her recent lipid panel is at goal except elevated triglycerides, but these have improved. I have encouraged her to continue weight loss efforts and physical activity. She should continue her current cardiovascular  medications.  We will see her back in follow-up in February as scheduled.       Orders:  No orders of the defined types were placed in this encounter.     Followup Appts:  Future Appointments   Date Time Provider Department Center   12/13/2024 10:30 AM [id number]   12/23/2024  2:45 PM Donis Perez MD [id number] [facility]   2/11/2025  2:40 PM Elías Hutchison MD [id number] [facility]   6/17/2025  2:00 PM Hyacinth Rodriguez MD [id number] [facility]           ____________________________________________________________  Anais Porter, APRN-CNP  [facility]  [facility]

## 2024-12-12 DIAGNOSIS — E11.69 TYPE 2 DIABETES MELLITUS WITH OTHER SPECIFIED COMPLICATION, WITH LONG-TERM CURRENT USE OF INSULIN: ICD-10-CM

## 2024-12-12 DIAGNOSIS — Z79.4 TYPE 2 DIABETES MELLITUS WITH OTHER SPECIFIED COMPLICATION, WITH LONG-TERM CURRENT USE OF INSULIN: ICD-10-CM

## 2024-12-12 RX ORDER — INSULIN GLARGINE 100 [IU]/ML
25 INJECTION, SOLUTION SUBCUTANEOUS NIGHTLY
Qty: 21 ML | Refills: 3 | Status: SHIPPED | OUTPATIENT
Start: 2024-12-12 | End: 2024-12-13 | Stop reason: SDUPTHER

## 2024-12-13 ENCOUNTER — HOSPITAL ENCOUNTER (OUTPATIENT)
Dept: CARDIOLOGY | Facility: HOSPITAL | Age: 64
Discharge: HOME | End: 2024-12-13
Payer: MEDICARE

## 2024-12-13 DIAGNOSIS — Z79.4 TYPE 2 DIABETES MELLITUS WITH OTHER SPECIFIED COMPLICATION, WITH LONG-TERM CURRENT USE OF INSULIN: ICD-10-CM

## 2024-12-13 DIAGNOSIS — Z95.810 PRESENCE OF AUTOMATIC (IMPLANTABLE) CARDIAC DEFIBRILLATOR: ICD-10-CM

## 2024-12-13 DIAGNOSIS — I47.29 OTHER VENTRICULAR TACHYCARDIA: ICD-10-CM

## 2024-12-13 DIAGNOSIS — E11.69 TYPE 2 DIABETES MELLITUS WITH OTHER SPECIFIED COMPLICATION, WITH LONG-TERM CURRENT USE OF INSULIN: ICD-10-CM

## 2024-12-13 PROCEDURE — 93283 PRGRMG EVAL IMPLANTABLE DFB: CPT

## 2024-12-13 RX ORDER — INSULIN GLARGINE 100 [IU]/ML
25 INJECTION, SOLUTION SUBCUTANEOUS NIGHTLY
Qty: 21 ML | Refills: 3 | Status: SHIPPED | OUTPATIENT
Start: 2024-12-13 | End: 2025-11-14

## 2024-12-16 ENCOUNTER — APPOINTMENT (OUTPATIENT)
Dept: ORTHOPEDIC SURGERY | Facility: CLINIC | Age: 64
End: 2024-12-16
Payer: COMMERCIAL

## 2024-12-16 DIAGNOSIS — Z79.4 TYPE 2 DIABETES MELLITUS WITH OTHER SPECIFIED COMPLICATION, WITH LONG-TERM CURRENT USE OF INSULIN: ICD-10-CM

## 2024-12-16 DIAGNOSIS — E11.69 TYPE 2 DIABETES MELLITUS WITH OTHER SPECIFIED COMPLICATION, WITH LONG-TERM CURRENT USE OF INSULIN: ICD-10-CM

## 2024-12-17 RX ORDER — INSULIN GLARGINE 100 [IU]/ML
30 INJECTION, SOLUTION SUBCUTANEOUS 2 TIMES DAILY
Qty: 18 ML | Refills: 11 | Status: SHIPPED | OUTPATIENT
Start: 2024-12-17 | End: 2024-12-19 | Stop reason: SDUPTHER

## 2024-12-19 ENCOUNTER — OFFICE VISIT (OUTPATIENT)
Dept: PRIMARY CARE | Facility: CLINIC | Age: 64
End: 2024-12-19
Payer: MEDICARE

## 2024-12-19 VITALS
RESPIRATION RATE: 20 BRPM | OXYGEN SATURATION: 98 % | WEIGHT: 209 LBS | TEMPERATURE: 97.2 F | HEART RATE: 79 BPM | DIASTOLIC BLOOD PRESSURE: 57 MMHG | BODY MASS INDEX: 29.92 KG/M2 | HEIGHT: 70 IN | SYSTOLIC BLOOD PRESSURE: 88 MMHG

## 2024-12-19 DIAGNOSIS — Z78.0 POSTMENOPAUSAL: ICD-10-CM

## 2024-12-19 DIAGNOSIS — Z12.4 CERVICAL CANCER SCREENING: ICD-10-CM

## 2024-12-19 DIAGNOSIS — Z12.11 COLON CANCER SCREENING: ICD-10-CM

## 2024-12-19 DIAGNOSIS — Z79.4 TYPE 2 DIABETES MELLITUS WITH OTHER SPECIFIED COMPLICATION, WITH LONG-TERM CURRENT USE OF INSULIN: Primary | ICD-10-CM

## 2024-12-19 DIAGNOSIS — E11.69 TYPE 2 DIABETES MELLITUS WITH OTHER SPECIFIED COMPLICATION, WITH LONG-TERM CURRENT USE OF INSULIN: Primary | ICD-10-CM

## 2024-12-19 DIAGNOSIS — Z12.31 ENCOUNTER FOR SCREENING MAMMOGRAM FOR MALIGNANT NEOPLASM OF BREAST: ICD-10-CM

## 2024-12-19 PROCEDURE — G2211 COMPLEX E/M VISIT ADD ON: HCPCS | Performed by: STUDENT IN AN ORGANIZED HEALTH CARE EDUCATION/TRAINING PROGRAM

## 2024-12-19 PROCEDURE — 1123F ACP DISCUSS/DSCN MKR DOCD: CPT | Performed by: STUDENT IN AN ORGANIZED HEALTH CARE EDUCATION/TRAINING PROGRAM

## 2024-12-19 PROCEDURE — 1159F MED LIST DOCD IN RCRD: CPT | Performed by: STUDENT IN AN ORGANIZED HEALTH CARE EDUCATION/TRAINING PROGRAM

## 2024-12-19 PROCEDURE — 99214 OFFICE O/P EST MOD 30 MIN: CPT | Performed by: STUDENT IN AN ORGANIZED HEALTH CARE EDUCATION/TRAINING PROGRAM

## 2024-12-19 PROCEDURE — 3074F SYST BP LT 130 MM HG: CPT | Performed by: STUDENT IN AN ORGANIZED HEALTH CARE EDUCATION/TRAINING PROGRAM

## 2024-12-19 PROCEDURE — 3008F BODY MASS INDEX DOCD: CPT | Performed by: STUDENT IN AN ORGANIZED HEALTH CARE EDUCATION/TRAINING PROGRAM

## 2024-12-19 PROCEDURE — 3078F DIAST BP <80 MM HG: CPT | Performed by: STUDENT IN AN ORGANIZED HEALTH CARE EDUCATION/TRAINING PROGRAM

## 2024-12-19 PROCEDURE — 1157F ADVNC CARE PLAN IN RCRD: CPT | Performed by: STUDENT IN AN ORGANIZED HEALTH CARE EDUCATION/TRAINING PROGRAM

## 2024-12-19 RX ORDER — INSULIN GLARGINE 100 [IU]/ML
30 INJECTION, SOLUTION SUBCUTANEOUS 2 TIMES DAILY
Qty: 18 ML | Refills: 11 | Status: SHIPPED | OUTPATIENT
Start: 2024-12-19 | End: 2025-01-06 | Stop reason: SDUPTHER

## 2024-12-19 ASSESSMENT — ENCOUNTER SYMPTOMS
DEPRESSION: 0
OCCASIONAL FEELINGS OF UNSTEADINESS: 0
LOSS OF SENSATION IN FEET: 0

## 2024-12-19 NOTE — PROGRESS NOTES
Pj Clarke is a 64 y.o. female seen in Clinic at [facility] [place] by Dr. Sigifredo Webster on 12/19/24 for Medicare Annual, as well as for management of the following chronic medical conditions: HFrEF/ICM, CAD, HTN, DLD, T2DM with associated diabetic neuropathy, lung nodules (likely histoplasmosis based on VATS biopsy), Obesity, GERD. Last seen in clinic 8/2024 for joint pain    INTERIM  - ER visit 9/2024 for a mechanical fall and found to have closed fracture of distal R radius and ulnar styloid process; hematoma block and reduced distal radius fracture now s/p Ortho ORIF R radius (10/11/24) with follow up on Monday and overall improving in mobility and strength; some lateral numbness in the hand; improved trigger finger with steroid injection  - Cardiology follow up without changes; cardiac device check (12/13/24)  - last A1C 8.6 % in 10/2024 increased from 7.3 (5/2024)  - Last lipid panel (5/2024) at goal outside of TG: TC 88, HDL 33, LDL 10,     ACUTE  - Weight 209 lb decreased from 224 lb (9/30/24)  - Clarification regarding insulin prescription; will uptitrate Ozempic 2 mg weekly  - Gyn referral for pap  - Colonoscopy  - due for Mammogram  - DEXA screen    CHRONIC MEDICAL CONDITIONS: (no discussed today)  #HFrEF, CAD, Cardiomyopathy, HTN, DLD   - follows with cardiology, Dr. Hutchison  - history of NSTEMI (02/2015) and STEMI s/p PCI with JAQUI to LAD (10/2016) with residual ischemic CM (EF 30%) s/p ICD   - prior echo 03/2021 with EF 35%, with regional motion wall abnormalities, impaired LV diastolic filling   - echo 6/13/2024: EF 35%, impaired LV diastolic filling, abnormal apex, mid and apical anterior wall and mid and apical anterior septum abnormal   - Last lipid panel (5/2024): TC 88, HDL 33, LDL 10,   - current regimen: ASA 81, Brilinta 60mg BID, Crestor 40mg daily, Ezetimibe 10mg at bedtime, Farxiga 10mg, Metoprolol succinate 100mg BID, Entresto 49-51 BID, Furosemide 20mg daily, Spironolactone 12.5mg  daily, Potassium 20meq daily  [  ] had discussed REPATHA, which she deferred however LDL now at goal   [  ] consider Quinn Savage   [  ] consider [facility] clinic referral      #T2DM  #Obesity   - last A1C 8.6 % in 10/2024 increased from 7.3 (5/2024)  - current regimen: Metformin 1000mg BID, Farxiga 10mg daily, Basaglar 25-30 units (BID), Glimepiride 4mg BID, Ozempic 1 mg/week (previously on Mounjaro 10mg weekly; stopped due to joint pain)  [  ] pharmacy referral  [  ] ensure following with optho 6/2025  [ ] Uptitrate Ozempic from 1mg to 2 mg qwkly     #Diabetic Neuropathy   - Gabapentin 300mg BID (recently uptitrated)     #RLE Swelling  #Chronic Venous Insufficiency with GSV Reflux  - She was hospitalized in January 2024 after fall with subsequent displaced fracture of distal L hallux and associated cellulitis. She previously was following closely with podiatry after hospitalization with overall improvement in clinical status, boot no longer in place, WBAT.   - In setting of gait being impacted by this, she developed worsening RLE edema and associated pain. This is her third visit for similar concern this Spring. Prior duplex performed to r/o clot which was negative. To evaluate for PAD, LEA/PVR also ordered with NORMAL results.   - Vascular medicine referral provided (has previously been seen by Dr. Murray for chronic venous insufficiency), has not pursued   - 7/1/24 b/l LE DVT us negative for DVT  - No weight gain to attribute LE swelling to HF; overall has improved per patient  [ ] Compression stockings  [ ] Follow up with Vascular Medicine as needed         #History of Posterior Tibial Tendonitis       #GERD  - Omeprazole 40mg daily      #Lung Nodules  - follows with pulmonary, last 06/2023  - CT chest unchanged at that visit, surgical lung biopsy (VATS 2021) showed necrotizing granulomas on pathology but no fungal organisms (still most likely histoplasmosis)  - Repeat CT chest06/2024) remains unchanged at that  time  [  ] Defer further routine imaging unless new symptoms arise     #MDD  - last medication over 10 years ago  - remote ECT (2002)        Past Medical History: as above   Past Medical History:   Diagnosis Date    Acute ischemic heart disease, unspecified     Acute coronary syndrome    Breast cyst had 1 as a teenager.    CHF (congestive heart failure)     Chronic pain disorder     Coronary artery disease     1 stent LAD    Dermatochalasis of unspecified eye, unspecified eyelid 11/13/2018    Dermatochalasis    Diabetes mellitus (Multi)     Essential (primary) hypertension 11/16/2022    Benign essential hypertension    Fractures     GERD (gastroesophageal reflux disease)     Peripheral neuropathy     Personal history of other mental and behavioral disorders     History of depression    Pure hyperglyceridemia 11/28/2016    Essential hypertriglyceridemia    Type 2 diabetes mellitus      Subspecialty Medical Care: Cardiology, Vascular Medicine, Podiatry, ENT, Pulmonary     Past Surgical History:   Past Surgical History:   Procedure Laterality Date    BREAST CYST ASPIRATION  when i was a young teenager.    CHOLECYSTECTOMY  06/19/2013    Cholecystectomy    GALLBLADDER SURGERY  05/11/2016    Gallbladder Surgery    HYSTERECTOMY  02/11/2015    Hysterectomy    OTHER SURGICAL HISTORY Left 06/29/2021    Lung wedge resection    OTHER SURGICAL HISTORY  01/03/2017    Implantable Cardioverter-Defibrillator    TONSILLECTOMY  05/11/2016    Tonsillectomy     Medications:  Current Outpatient Medications:     aspirin 81 mg EC tablet, Take 1 tablet (81 mg) by mouth once daily., Disp: , Rfl:     Crestor 40 mg tablet, Take 1 tablet (40 mg) by mouth once daily., Disp: 90 tablet, Rfl: 3    dapagliflozin propanediol (Farxiga) 10 mg, Take 1 tablet (10 mg) by mouth once daily., Disp: 90 tablet, Rfl: 3    ergocalciferol (Vitamin D-2) 1.25 MG (10156 UT) capsule, Take 1 capsule (50,000 Units) by mouth every 14 (fourteen) days., Disp: 6 capsule,  "Rfl: 3    ezetimibe (Zetia) 10 mg tablet, Take 1 tablet (10 mg) by mouth once daily., Disp: 90 tablet, Rfl: 3    furosemide (Lasix) 20 mg tablet, Take 1 tablet (20 mg) by mouth once daily., Disp: 90 tablet, Rfl: 3    gabapentin (Neurontin) 300 mg capsule, Take 1 capsule (300 mg) by mouth 2 times a day., Disp: 180 capsule, Rfl: 3    glimepiride (Amaryl) 4 mg tablet, Take 1 tablet (4 mg) by mouth 2 times a day., Disp: 180 tablet, Rfl: 3    insulin glargine (Lantus Solostar U-100 Insulin) 100 unit/mL (3 mL) pen, Inject 30 Units under the skin 2 times a day., Disp: 18 mL, Rfl: 11    metFORMIN  mg 24 hr tablet, Take 2 tablets (1,000 mg) by mouth 2 times daily (morning and late afternoon)., Disp: 360 tablet, Rfl: 3    metoprolol succinate XL (Toprol-XL) 100 mg 24 hr tablet, Take 1 tablet (100 mg) by mouth twice a day., Disp: 180 tablet, Rfl: 3    omeprazole (PriLOSEC) 40 mg DR capsule, Take 1 capsule (40 mg) by mouth once daily. Do not crush or chew, Disp: 90 capsule, Rfl: 3    pen needle, diabetic (BD Ultra-Fine Short Pen Needle) 31 gauge x 5/16\" needle, Inject 1 each into the skin 2 times a day., Disp: 200 each, Rfl: 2    potassium chloride CR 20 mEq ER tablet, Take 1 tablet (20 mEq) by mouth once daily., Disp: 90 tablet, Rfl: 3    sacubitriL-valsartan (Entresto) 49-51 mg tablet, Take 1 tablet by mouth 2 times a day., Disp: 180 tablet, Rfl: 3    semaglutide (OZEMPIC) 1 mg/dose (4 mg/3 mL) pen injector, Inject 1 mg under the skin 1 (one) time per week., Disp: 9 mL, Rfl: 0    spironolactone (Aldactone) 25 mg tablet, Take 0.5 tablets (12.5 mg) by mouth once daily., Disp: 45 tablet, Rfl: 3    ticagrelor (Brilinta) 60 mg tablet, Take 1 tablet (60 mg) by mouth 2 times a day., Disp: 180 tablet, Rfl: 3    tirzepatide (Mounjaro) 12.5 mg/0.5 mL pen injector, Inject 12.5 mg under the skin 1 (one) time per week. (Patient not taking: Reported on 12/10/2024), Disp: 2 mL, Rfl: 0  Pharmacy: [facility] ([place])     Allergies: "   Allergies   Allergen Reactions    Penicillins Hives and Shortness of breath    Atorvastatin Other     severe leg cramps    Metformin Hcl Diarrhea     Only with HCL brand.   Good with ER     Immunizations:   - Flu annually   - COVID booster--recommend staying UTD  - RSV: completed 10/2023  - Tdap 2019-->due 2029  - PCV/PPSV 23: last 2018; discuss PCV-20 at CPE  - Shingrix: recommend     Family History:   Family History   Problem Relation Name Age of Onset    Other (Malignant neoplasm) Mother      Other (Malignant neoplasm) Father      Colon cancer Mother's Sister      Other (Family history of diabetes mellitus) Other       Social History:   Home/Living Situation/Falls/Safety Assessment: lives at home with , became critically ill during COVID   Education/Employment/Work/Vocational:   Activities:   Drug Use:   Diet: T2DM, CAD  Depression/Anxiety: prior history   Sexuality/Contraception/Menstrual History:    Sleep:     Patient Information:  Health Insurance: has insurance   Transportation:   Healthcare POA/Guardian:    Contact Information:     Visit Vitals  OB Status Hysterectomy   Smoking Status Never      PHYSICAL EXAM:   General: well appearing  female in NAD  HEENT: NCAT, MMM, large neck circumference, corrective lenses in place  CV: RRR  PULM: CTAB  ABD: soft, obese, NT, ND  EXT: RLE with ankle/calf swelling and larger circumference compared to LLE, 1+ edema, warm to touch, no erythema or acute trauma, no active skin breakage, no significant pain of calf, diminished R DP pulse   NEURO/PSYCH: A&Ox4, gait impacted by swelling, symmetric facies, appropriate mentation     Assessment/Plan    Pj Clarke is a 64 y.o. female [place] [facility] [place] Sigifredo Webster on 12/19/24 for routine care, as well as for management of the following chronic medical conditions: HFrEF/ICM, CAD, HTN, DLD, T2DM with associated diabetic neuropathy, Obesity, GERD. Patient presents today for  Medicare Annual    ACUTE  - Clarification regarding insulin script; uptitrate Ozempic 2 mg  - Gyn referral for pap  - Colonoscopy due   - due for Mammogram  - DEXA screen    CHRONIC MEDICAL CONDITIONS: (no discussed today)  #HFrEF, CAD, Cardiomyopathy, HTN, DLD   - follows with cardiology, Dr. Hutchison  - history of NSTEMI (02/2015) and STEMI s/p PCI with JAQUI to LAD (10/2016) with residual ischemic CM (EF 30%) s/p ICD   - prior echo 03/2021 with EF 35%, with regional motion wall abnormalities, impaired LV diastolic filling   - echo 6/13/2024: EF 35%, impaired LV diastolic filling, abnormal apex, mid and apical anterior wall and mid and apical anterior septum abnormal   - Last lipid panel (5/2024): TC 88, HDL 33, LDL 10,   - current regimen: ASA 81, Brilinta 60mg BID, Crestor 40mg daily, Ezetimibe 10mg at bedtime, Farxiga 10mg, Metoprolol succinate 100mg BID, Entresto 49-51 BID, Furosemide 20mg daily, Spironolactone 12.5mg daily, Potassium 20meq daily  [  ] had discussed REPATHA, which she deferred however LDL now at goal   [  ] consider Quinn Savage   [  ] consider [facility] clinic referral      #T2DM  #Obesity   - last A1C 8.6 % in 10/2024 increased from 7.3 (5/2024)  - current regimen: Metformin 1000mg BID, Farxiga 10mg daily, Basaglar 25-30 units (BID), Glimepiride 4mg BID, Ozempic 1 mg/week (previously on Mounjaro 10mg weekly; stopped due to joint pain)  [  ] pharmacy referral  [  ] ensure following with optho 6/2025  [ ] Uptitrate Ozempic from 1mg to 2 mg qwkly     #Diabetic Neuropathy   - Gabapentin 300mg BID (recently uptitrated)     #RLE Swelling  #Chronic Venous Insufficiency with GSV Reflux  - She was hospitalized in January 2024 after fall with subsequent displaced fracture of distal L hallux and associated cellulitis. She previously was following closely with podiatry after hospitalization with overall improvement in clinical status, boot no longer in place, WBAT.   - In setting of gait being impacted  by this, she developed worsening RLE edema and associated pain. This is her third visit for similar concern this Spring. Prior duplex performed to r/o clot which was negative. To evaluate for PAD, LEA/PVR also ordered with NORMAL results.   - Vascular medicine referral provided (has previously been seen by Dr. Murray for chronic venous insufficiency), has not pursued   - 7/1/24 b/l LE DVT us negative for DVT  - No weight gain to attribute LE swelling to HF; overall has improved per patient  [ ] Compression stockings  [ ] Follow up with Vascular Medicine as needed        #History of Posterior Tibial Tendonitis       #GERD  - Omeprazole 40mg daily      #Lung Nodules  - follows with pulmonary, last 06/2023  - CT chest unchanged at that visit, surgical lung biopsy (VATS 2021) showed necrotizing granulomas on pathology but no fungal organisms (still most likely histoplasmosis)  - Repeat CT chest (06/2024) remains unchanged at that time  [  ] Defer further routine imaging unless new symptoms arise     #MDD  - last medication over 10 years ago  - remote ECT (2002)       #Health Maintenance    Cancer Screening  - Cervical Cancer Screening: last pap smear/HPV testing: due 2024 will provide Gyn referral  - Mammography: last 12/2023 due now  - Colorectal Cancer Screening: last 2015; open to referral today  - Lung Cancer Screening: follow up on symptoms iso of pulmonary nodules; stable on rescan    Laboratory Screening  - Lipid Screen: CAD, on statin, at goal   - ASCVD Score: CAD   - A1C, glucose screen: 7.3% in 05/2024   - STI, HIV, Hep B screen:   - Hep C screen:     Imaging Screening  - Osteoporosis/DEXA screening: due at 65     Immunizations:   - Flu annually   - COVID booster--recommend staying UTD d/t neuropathy side effect   - RSV: completed 10/2023  - Tdap 2019-->due 2029  - PCV/PPSV 23: last 2018; discuss PCV-20 at CPE  - Shingrix: recommend     Other Screening  - Health Literacy Assessment: excellent   - Depression  screen: remote history   - Home safety/partner violence screen: negative   - Hearing/Vision screens: corrective lenses, T2DM, follow with optho  - Alcohol/tobacco/drug use screen:   - Healthcare POA/Advanced Directives:        Patient Discussion:    Please call back the office with any questions at [phone]. In the case of an emergency, please call 911 or go to the nearest Emergency Department.      Sigifredo Webster MD  Internal Medicine-Pediatrics  [facility] [address], [address]  P: [phone], F: [phone]

## 2024-12-21 NOTE — PROGRESS NOTES
Postoperative follow-up after ORIF right distal radius fracture.  Date of surgery October 11, 2024.  Overall doing well.  Has continued to improve and increase the use of her hand for normal daily activities.  She still prefers to wear the brace most of the day.  At her last visit we gave her a trigger finger injection into her left middle finger which resulted in resolution of this particular problem but now she is experiencing some occasional triggering of her left small finger.    Examination of the of the right wrist reveals healed volar surgical incision.  Minimal swelling.  Full digital range of motion.  Full pronation.  Supination to about 70 degrees.  Roughly 35 degrees each of wrist flexion and extension.  Sensation intact to light touch in the radial aspect of the hand but she does have some slight diminished sensation in the small finger.    Examination of the left hand reveals tenderness to palpation and triggering with flexion of the left small finger.    Review of x-rays right wrist obtained today demonstrate what appears to be a well-healed fracture of the distal radius with anatomic alignment.    Impression: Right wrist fracture, left hand trigger finger.    Plan: For her right hand she looks great.  She can wean from the splint as she tolerates and progress with her return to activities as tolerated.  I have encouraged her to continue working on active, active assist and passive range of motion efforts particularly to improve the sagittal plane motion of her wrist and her supination.  I have offered her a trigger finger injection into her left small finger flexor sheath today but she has declined to.  She will return to see me in 3 months for repeat clinical examination with x-rays right wrist or sooner if her trigger finger symptoms on the left hand worsen.    Donis Perez MD    [employer]  Department of Orthopaedic Surgery  Chief of  Hand and Upper Extremity Surgery  [facility]    Dictation performed with the use of voice recognition software. Syntax and grammatical errors may exist.

## 2024-12-23 ENCOUNTER — HOSPITAL ENCOUNTER (OUTPATIENT)
Dept: RADIOLOGY | Facility: CLINIC | Age: 64
Discharge: HOME | End: 2024-12-23
Payer: MEDICARE

## 2024-12-23 ENCOUNTER — APPOINTMENT (OUTPATIENT)
Dept: ORTHOPEDIC SURGERY | Facility: CLINIC | Age: 64
End: 2024-12-23
Payer: COMMERCIAL

## 2024-12-23 VITALS — WEIGHT: 209 LBS | BODY MASS INDEX: 29.92 KG/M2 | HEIGHT: 70 IN

## 2024-12-23 DIAGNOSIS — S52.501A CLOSED FRACTURE OF DISTAL END OF RIGHT RADIUS, UNSPECIFIED FRACTURE MORPHOLOGY, INITIAL ENCOUNTER: ICD-10-CM

## 2024-12-23 DIAGNOSIS — S52.501A CLOSED FRACTURE OF DISTAL END OF RIGHT RADIUS, UNSPECIFIED FRACTURE MORPHOLOGY, INITIAL ENCOUNTER: Primary | ICD-10-CM

## 2024-12-23 PROCEDURE — 73110 X-RAY EXAM OF WRIST: CPT | Mod: RIGHT SIDE | Performed by: RADIOLOGY

## 2024-12-23 PROCEDURE — 99024 POSTOP FOLLOW-UP VISIT: CPT | Performed by: ORTHOPAEDIC SURGERY

## 2024-12-23 PROCEDURE — 3052F HG A1C>EQUAL 8.0%<EQUAL 9.0%: CPT | Performed by: ORTHOPAEDIC SURGERY

## 2024-12-23 PROCEDURE — 1036F TOBACCO NON-USER: CPT | Performed by: ORTHOPAEDIC SURGERY

## 2024-12-23 PROCEDURE — 1159F MED LIST DOCD IN RCRD: CPT | Performed by: ORTHOPAEDIC SURGERY

## 2024-12-23 PROCEDURE — 73110 X-RAY EXAM OF WRIST: CPT | Mod: RT

## 2024-12-23 PROCEDURE — 3048F LDL-C <100 MG/DL: CPT | Performed by: ORTHOPAEDIC SURGERY

## 2024-12-23 PROCEDURE — 3008F BODY MASS INDEX DOCD: CPT | Performed by: ORTHOPAEDIC SURGERY

## 2024-12-23 PROCEDURE — 1157F ADVNC CARE PLAN IN RCRD: CPT | Performed by: ORTHOPAEDIC SURGERY

## 2024-12-23 PROCEDURE — 1123F ACP DISCUSS/DSCN MKR DOCD: CPT | Performed by: ORTHOPAEDIC SURGERY

## 2024-12-23 ASSESSMENT — PAIN - FUNCTIONAL ASSESSMENT: PAIN_FUNCTIONAL_ASSESSMENT: NO/DENIES PAIN

## 2025-01-10 ENCOUNTER — APPOINTMENT (OUTPATIENT)
Dept: PHARMACY | Facility: HOSPITAL | Age: 65
End: 2025-01-10
Payer: MEDICARE

## 2025-01-10 DIAGNOSIS — E11.69 TYPE 2 DIABETES MELLITUS WITH OTHER SPECIFIED COMPLICATION, WITH LONG-TERM CURRENT USE OF INSULIN: ICD-10-CM

## 2025-01-10 DIAGNOSIS — Z79.4 TYPE 2 DIABETES MELLITUS WITH OTHER SPECIFIED COMPLICATION, WITH LONG-TERM CURRENT USE OF INSULIN: ICD-10-CM

## 2025-01-10 NOTE — ASSESSMENT & PLAN NOTE
Patient's goal A1c is < 7%.  Is pt at goal? No, 8.6%  Patient's SMBGs are generally at goal, some high FBG.     Rationale for plan: Patient to increase Ozempic as directed by PCP, will evaluate after increase.    Medication Changes:  CONTINUE  Metformin  mg - 2 tablets twice daily   Lantus Solostar- 30 units twice daily   Glimepiride 4 mg - 1 tablet twice daily   Farxiga 10 mg - 1 tablet daily   INCREASE  Ozempic 2 mg/dose - once weekly     Future Considerations:  CGM and titration of Lantus - patient sent information on Sorin and Dexcom today via e-mail for consideration

## 2025-01-10 NOTE — PROGRESS NOTES
Clinical Pharmacy Appointment  Subjective     Patient ID: Pj Clarke is a 64 y.o. female who presents for Diabetes.    Referring Provider: Sigifredo Webster MD     Patient confirms no issues with getting medications from  [facility]- working with minoff/cardio for approval of BRAND NAME Crestor.     DIABETES MELLITUS TYPE 2:    Does patient follow with Endocrinology: No  Last optometry exam: 6/2025    Diet: not discussed today    Exercise: not discussed today     Allergies   Allergen Reactions    Penicillins Hives and Shortness of breath    Atorvastatin Other     severe leg cramps    Metformin Hcl Diarrhea     Only with HCL brand.   Good with ER       Objective     Current DM Pharmacotherapy:   Metformin  mg - 2 tablets twice daily   Lantus Solostar- 30 units twice daily   Glimepiride 4 mg - 1 tablet twice daily   Ozempic 1 mg/dose- once weekly   Farxiga 10 mg - 1 tablet daily     Clarifications to above regimen: has not increased to Ozempic 2 mg yet  Adverse Effects: None    Previous DM Pharmacotherapy:   Mounjaro - joint pain at higher doses     SECONDARY PREVENTION  - Statin? Yes, Rosuvastatin 40 mg  - ACEi/ARB? Yes, Entresto  - Aspirin? Yes    Current monitoring regimen:   Patient is using: glucometer    Testing frequency: twice daily FBG & PPG prior to bed    SMBG Readings: 120-150 mg/dL FBG - ~170 mg/dL PPG     Any episodes of hypoglycemia? No.  Did patient treat episode of hypoglycemia appropriately? N/A    Pertinent PMH Review:  - PMH of Pancreatitis: No  - PMH/FH of Medullary Thyroid Cancer: No  - PMH/FH of Multiple Endocrine Neoplasia (MEN) Type II: No  - PMH of Retinopathy: No  - PMH of Urinary Tract Infections/Yeast Infections: No    Immunizations:  Influenza? No  COVID? Yes  Pneumonia? Yes  Shingles? No  Hepatitis B? No    Lab Review  BMP  Lab Results   Component Value Date    CALCIUM 9.8 10/08/2024     10/08/2024    K 4.7 10/08/2024    CO2 22 10/08/2024     10/08/2024     BUN 26 (H) 10/08/2024    CREATININE 0.87 10/08/2024    EGFR 75 10/08/2024     LFTs  Lab Results   Component Value Date    ALT 15 05/30/2024    AST 13 05/30/2024    ALKPHOS 68 05/30/2024    BILITOT 0.6 05/30/2024     FLP  Lab Results   Component Value Date    TRIG 223 (H) 05/30/2024    CHOL 88 05/30/2024    LDLF - 09/23/2023    LDLCALC 10 05/30/2024    HDL 33.9 05/30/2024       The ASCVD Risk score (Jose L QUIGLEY, et al., 2019) failed to calculate for the following reasons:    Risk score cannot be calculated because patient has a medical history suggesting prior/existing ASCVD  Urine Microalbumin  Lab Results   Component Value Date    MICROALBCREA 117.6 (H) 09/23/2023     Weight Management  Wt Readings from Last 3 Encounters:   12/23/24 94.8 kg (209 lb)   12/19/24 94.8 kg (209 lb)   12/10/24 95.3 kg (210 lb)      There is no height or weight on file to calculate BMI.   A1C  Lab Results   Component Value Date    HGBA1C 8.6 (H) 10/08/2024    HGBA1C 7.3 (H) 05/30/2024    HGBA1C 7.7 (H) 01/19/2024         Assessment/Plan     Problem List Items Addressed This Visit       Type 2 diabetes mellitus, with long-term current use of insulin     Patient's goal A1c is < 7%.  Is pt at goal? No, 8.6%  Patient's SMBGs are generally at goal, some high FBG.     Rationale for plan: Patient to increase Ozempic as directed by PCP, will evaluate after increase.    Medication Changes:  CONTINUE  Metformin  mg - 2 tablets twice daily   Lantus Solostar- 30 units twice daily   Glimepiride 4 mg - 1 tablet twice daily   Farxiga 10 mg - 1 tablet daily   INCREASE  Ozempic 2 mg/dose - once weekly     Future Considerations:  CGM and titration of Lantus - patient sent information on Sorin and Dexcom today via e-mail for consideration            [facility] Patient Assistance Screening (VAF)    Patient verbally reports monthly or yearly income which is less than 400% federal poverty level    Application for program will be submitted for the following  medications: Lantus Solostar, Farxiga, Ozempic, Entresto, Brilinta    Patient aware that they will need to provide appropriate proof of income documents: 2023 Federal 1040- e-mail sent to patient today     Patient aware this process may take up to 2-4 weeks from time of submission and application will be submitted upon receipt of income documents     If approved, medication must be filled through [facility] pharmacy and may be picked up or mailed to patient. If approved, medication will be billed through insurance, and patient assistance team will pay the copay. This will result in a $0 copay for the patient.    Follow up: I recommend diabetes care be TBD- appointment with  next week, will schedule their follow-ups together moving forward    Margareth SimpsonD, BCACP  Clinical Pharmacy Specialist, Primary Care     Continue all meds under the continuation of care with the referring provider and clinical pharmacy team

## 2025-03-24 ENCOUNTER — APPOINTMENT (OUTPATIENT)
Dept: ORTHOPEDIC SURGERY | Facility: CLINIC | Age: 65
End: 2025-03-24
Payer: MEDICARE

## 2025-06-17 ENCOUNTER — APPOINTMENT (OUTPATIENT)
Dept: OPHTHALMOLOGY | Facility: CLINIC | Age: 65
End: 2025-06-17
Payer: COMMERCIAL

## 2025-06-17 DIAGNOSIS — H52.223 REGULAR ASTIGMATISM, BILATERAL: ICD-10-CM

## 2025-06-17 DIAGNOSIS — H52.13 MYOPIA OF BOTH EYES: ICD-10-CM

## 2025-06-17 DIAGNOSIS — E11.9 CONTROLLED TYPE 2 DIABETES MELLITUS WITHOUT COMPLICATION, UNSPECIFIED WHETHER LONG TERM INSULIN USE: Primary | ICD-10-CM

## 2025-06-17 DIAGNOSIS — H52.4 PRESBYOPIA: ICD-10-CM

## 2025-06-17 PROCEDURE — 92014 COMPRE OPH EXAM EST PT 1/>: CPT | Performed by: OPHTHALMOLOGY

## 2025-06-17 PROCEDURE — 92015 DETERMINE REFRACTIVE STATE: CPT | Performed by: OPHTHALMOLOGY

## 2025-06-17 ASSESSMENT — CUP TO DISC RATIO
OD_RATIO: .3
OS_RATIO: .3

## 2025-06-17 ASSESSMENT — TONOMETRY
OS_IOP_MMHG: 14
IOP_METHOD: GOLDMANN APPLANATION
OD_IOP_MMHG: 14

## 2025-06-17 ASSESSMENT — SLIT LAMP EXAM - LIDS
COMMENTS: DERMATOCHALASIS
COMMENTS: DERMATOCHALASIS,

## 2025-06-17 ASSESSMENT — REFRACTION_MANIFEST
OD_SPHERE: -6.25
OD_CYLINDER: -0.50
OS_SPHERE: -5.50
OD_ADD: +2.50
OS_ADD: +2.50
OS_CYLINDER: -1.25
OS_AXIS: 180
OD_AXIS: 175

## 2025-06-17 ASSESSMENT — EXTERNAL EXAM - LEFT EYE: OS_EXAM: NORMAL

## 2025-06-17 ASSESSMENT — CONF VISUAL FIELD
OS_SUPERIOR_NASAL_RESTRICTION: 0
OS_NORMAL: 1
OD_SUPERIOR_TEMPORAL_RESTRICTION: 0
OS_INFERIOR_TEMPORAL_RESTRICTION: 0
OD_INFERIOR_NASAL_RESTRICTION: 0
OD_SUPERIOR_NASAL_RESTRICTION: 0
OD_INFERIOR_TEMPORAL_RESTRICTION: 0
OS_INFERIOR_NASAL_RESTRICTION: 0
OS_SUPERIOR_TEMPORAL_RESTRICTION: 0
OD_NORMAL: 1

## 2025-06-17 ASSESSMENT — REFRACTION_WEARINGRX
OD_SPHERE: -5.75
OD_ADD: +2.50
OD_CYLINDER: -0.50
OS_CYLINDER: -1.25
OD_AXIS: 175
OS_AXIS: 180
OS_ADD: +2.50
OS_SPHERE: -6.00

## 2025-06-17 ASSESSMENT — VISUAL ACUITY
CORRECTION_TYPE: GLASSES
OD_CC: 20/30-1
OS_CC: 20/20-2
METHOD: SNELLEN - LINEAR
OD_PH_CC: 20/25-1

## 2025-06-17 ASSESSMENT — EXTERNAL EXAM - RIGHT EYE: OD_EXAM: NORMAL

## 2025-06-17 NOTE — PROGRESS NOTES
Assessment/Plan   Diagnoses and all orders for this visit:  Controlled type 2 diabetes mellitus without complication, unspecified whether long term insulin use  no retinopathy observed on exam today od/os, pt ed to continue good BGlc, blood pressure and lipid control, rtc with any changes in vision, otherwise monitor 1 year   Myopia of both eyes  Regular astigmatism, bilateral  Presbyopia  Glasses prescription given to patient today

## (undated) DEVICE — DRAPE MINI, C-ARM, W/POLY STRAPS, 54 X 84 IN

## (undated) DEVICE — REST, HEAD, BAGEL, 9 IN

## (undated) DEVICE — COVER, CART, 45 X 27 X 48 IN, CLEAR

## (undated) DEVICE — SUTURE, SILK, 3-0, 18 IN, MULTIPACK, BLACK

## (undated) DEVICE — MANIFOLD, 4 PORT NEPTUNE STANDARD

## (undated) DEVICE — SUTURE, VICRYL, 3-0, 18 IN, PS2, UNDYED

## (undated) DEVICE — CORD, BIPOLAR,  12 FT, DISPOSABLE, LF

## (undated) DEVICE — SUTURE, VICRYL, 4-0, 18 IN, UNDYED BR PS-2

## (undated) DEVICE — Device

## (undated) DEVICE — SPONGE GAUZE, XRAY SC+RFID, 4X4 16 PLY, STERILE

## (undated) DEVICE — BANDAGE, ELASTIC, MATRIX, SELF-CLOSURE, 4 IN X 5 YD, LF

## (undated) DEVICE — DRILL BIT, AO, 2.0 X 135MM, SCALED

## (undated) DEVICE — APPLICATOR, CHLORAPREP, W/ORANGE TINT, 26ML